# Patient Record
Sex: MALE | Race: WHITE | Employment: OTHER | RURAL
[De-identification: names, ages, dates, MRNs, and addresses within clinical notes are randomized per-mention and may not be internally consistent; named-entity substitution may affect disease eponyms.]

---

## 2017-02-03 ENCOUNTER — HOSPITAL ENCOUNTER (OUTPATIENT)
Dept: OTHER | Age: 77
Discharge: HOME | End: 2017-02-03
Attending: NURSE PRACTITIONER | Admitting: NURSE PRACTITIONER

## 2017-09-08 ENCOUNTER — HOSPITAL ENCOUNTER (OUTPATIENT)
Dept: OTHER | Age: 77
Discharge: HOME | End: 2017-09-08
Attending: NURSE PRACTITIONER | Admitting: NURSE PRACTITIONER

## 2018-06-28 ENCOUNTER — HOSPITAL ENCOUNTER (OUTPATIENT)
Dept: OTHER | Age: 78
Discharge: HOME | End: 2018-06-28
Attending: NURSE PRACTITIONER | Admitting: NURSE PRACTITIONER

## 2018-10-24 ENCOUNTER — HOSPITAL ENCOUNTER (OUTPATIENT)
Dept: GENERAL RADIOLOGY | Facility: HOSPITAL | Age: 78
Discharge: HOME | End: 2018-10-24
Payer: MEDICARE

## 2018-10-24 ENCOUNTER — HOSPITAL ENCOUNTER (OUTPATIENT)
Facility: HOSPITAL | Age: 78
Discharge: HOME | End: 2018-10-24
Payer: MEDICARE

## 2018-10-24 DIAGNOSIS — M25.552 BILATERAL HIP PAIN: ICD-10-CM

## 2018-10-24 DIAGNOSIS — M54.40 LOW BACK PAIN WITH SCIATICA, SCIATICA LATERALITY UNSPECIFIED, UNSPECIFIED BACK PAIN LATERALITY, UNSPECIFIED CHRONICITY: ICD-10-CM

## 2018-10-24 DIAGNOSIS — M25.551 BILATERAL HIP PAIN: ICD-10-CM

## 2018-10-24 PROCEDURE — 73521 X-RAY EXAM HIPS BI 2 VIEWS: CPT

## 2018-10-24 PROCEDURE — 72100 X-RAY EXAM L-S SPINE 2/3 VWS: CPT

## 2018-11-20 ENCOUNTER — HOSPITAL ENCOUNTER (OUTPATIENT)
Facility: HOSPITAL | Age: 78
Discharge: HOME | End: 2018-11-20
Payer: MEDICARE

## 2018-11-20 PROCEDURE — 36415 COLL VENOUS BLD VENIPUNCTURE: CPT

## 2019-05-22 ENCOUNTER — HOSPITAL ENCOUNTER (OUTPATIENT)
Facility: HOSPITAL | Age: 79
Discharge: HOME | End: 2019-05-22
Payer: MEDICARE

## 2019-05-22 PROCEDURE — 36415 COLL VENOUS BLD VENIPUNCTURE: CPT

## 2020-03-12 ENCOUNTER — HOSPITAL ENCOUNTER (OUTPATIENT)
Facility: HOSPITAL | Age: 80
Discharge: HOME | End: 2020-03-12
Payer: MEDICARE

## 2020-03-12 ENCOUNTER — HOSPITAL ENCOUNTER (OUTPATIENT)
Dept: GENERAL RADIOLOGY | Facility: HOSPITAL | Age: 80
Discharge: HOME | End: 2020-03-12
Payer: MEDICARE

## 2020-03-12 PROCEDURE — 72100 X-RAY EXAM L-S SPINE 2/3 VWS: CPT

## 2020-03-12 PROCEDURE — 73502 X-RAY EXAM HIP UNI 2-3 VIEWS: CPT

## 2020-10-26 ENCOUNTER — HOSPITAL ENCOUNTER (OUTPATIENT)
Facility: HOSPITAL | Age: 80
Discharge: HOME | End: 2020-10-26
Payer: MEDICARE

## 2020-10-26 PROCEDURE — 36415 COLL VENOUS BLD VENIPUNCTURE: CPT

## 2021-04-16 ENCOUNTER — HOSPITAL ENCOUNTER (OUTPATIENT)
Facility: HOSPITAL | Age: 81
Discharge: HOME | End: 2021-04-16
Payer: MEDICARE

## 2021-04-16 PROCEDURE — 36415 COLL VENOUS BLD VENIPUNCTURE: CPT

## 2021-06-15 ENCOUNTER — HOSPITAL ENCOUNTER (OUTPATIENT)
Facility: HOSPITAL | Age: 81
Discharge: HOME | End: 2021-06-15
Payer: MEDICARE

## 2021-06-15 PROCEDURE — 36415 COLL VENOUS BLD VENIPUNCTURE: CPT

## 2021-09-07 ENCOUNTER — HOSPITAL ENCOUNTER (OUTPATIENT)
Facility: HOSPITAL | Age: 81
Discharge: HOME | End: 2021-09-07
Payer: MEDICARE

## 2021-09-07 PROCEDURE — 36415 COLL VENOUS BLD VENIPUNCTURE: CPT

## 2022-01-31 ENCOUNTER — HOSPITAL ENCOUNTER (INPATIENT)
Facility: HOSPITAL | Age: 82
LOS: 3 days | Discharge: HOME | DRG: 177 | End: 2022-02-03
Attending: EMERGENCY MEDICINE | Admitting: INTERNAL MEDICINE
Payer: MEDICARE

## 2022-01-31 ENCOUNTER — APPOINTMENT (OUTPATIENT)
Dept: CT IMAGING | Facility: HOSPITAL | Age: 82
DRG: 177 | End: 2022-01-31
Payer: MEDICARE

## 2022-01-31 ENCOUNTER — APPOINTMENT (OUTPATIENT)
Dept: GENERAL RADIOLOGY | Facility: HOSPITAL | Age: 82
DRG: 177 | End: 2022-01-31
Payer: MEDICARE

## 2022-01-31 DIAGNOSIS — U07.1 PNEUMONIA DUE TO COVID-19 VIRUS: Primary | ICD-10-CM

## 2022-01-31 DIAGNOSIS — J12.82 PNEUMONIA DUE TO COVID-19 VIRUS: Primary | ICD-10-CM

## 2022-01-31 PROBLEM — J96.01 ACUTE HYPOXEMIC RESPIRATORY FAILURE DUE TO COVID-19 (HCC): Status: ACTIVE | Noted: 2022-01-31

## 2022-01-31 LAB
A/G RATIO: 1.4 (ref 0.8–2)
ACETAMINOPHEN LEVEL: <15 UG/ML (ref 10–30)
ALBUMIN SERPL-MCNC: 3.8 G/DL (ref 3.4–4.8)
ALP BLD-CCNC: 58 U/L (ref 25–100)
ALT SERPL-CCNC: 14 U/L (ref 4–36)
AMMONIA: 37 MCG/DL (ref 27–102)
AMORPHOUS: ABNORMAL /HPF
AMPHETAMINE SCREEN, URINE: NORMAL
ANION GAP SERPL CALCULATED.3IONS-SCNC: 16 MMOL/L (ref 3–16)
AST SERPL-CCNC: 22 U/L (ref 8–33)
BACTERIA: ABNORMAL /HPF
BARBITURATE SCREEN URINE: NORMAL
BASE EXCESS ARTERIAL: -0.7 MMOL/L (ref -3–3)
BASE EXCESS ARTERIAL: -2 MMOL/L (ref -3–3)
BASOPHILS ABSOLUTE: 0 K/UL (ref 0–0.1)
BASOPHILS RELATIVE PERCENT: 0.1 %
BENZODIAZEPINE SCREEN, URINE: NORMAL
BILIRUB SERPL-MCNC: 0.5 MG/DL (ref 0.3–1.2)
BILIRUBIN URINE: NEGATIVE
BLOOD, URINE: ABNORMAL
BUN BLDV-MCNC: 29 MG/DL (ref 6–20)
BUPRENORPHINE QUAL, URINE: NORMAL
C-REACTIVE PROTEIN: 44.6 MG/L (ref 0–5.1)
CALCIUM SERPL-MCNC: 9.1 MG/DL (ref 8.5–10.5)
CANNABINOID SCREEN URINE: NORMAL
CHLORIDE BLD-SCNC: 98 MMOL/L (ref 98–107)
CLARITY: CLEAR
CO2: 22 MMOL/L (ref 20–30)
COCAINE METABOLITE SCREEN URINE: NORMAL
COLOR: YELLOW
CREAT SERPL-MCNC: 2.2 MG/DL (ref 0.4–1.2)
D DIMER: >4 UG/ML FEU (ref 0–0.6)
EOSINOPHILS ABSOLUTE: 0 K/UL (ref 0–0.4)
EOSINOPHILS RELATIVE PERCENT: 0 %
ETHANOL: NORMAL MG/DL (ref 0–0.08)
FERRITIN: 672.1 NG/ML (ref 22–322)
FINE CASTS, UA: ABNORMAL /LPF (ref 0–2)
FIO2: 0.21 %
FIO2: 0.32 %
GFR AFRICAN AMERICAN: 35
GFR NON-AFRICAN AMERICAN: 29
GLOBULIN: 2.7 G/DL
GLUCOSE BLD-MCNC: 216 MG/DL (ref 74–106)
GLUCOSE BLD-MCNC: 228 MG/DL (ref 74–106)
GLUCOSE BLD-MCNC: 250 MG/DL (ref 74–106)
GLUCOSE BLD-MCNC: 257 MG/DL (ref 74–106)
GLUCOSE URINE: 250 MG/DL
HCO3 ARTERIAL: 20.5 MMOL/L (ref 22–26)
HCO3 ARTERIAL: 21.8 MMOL/L (ref 22–26)
HCT VFR BLD CALC: 46.8 % (ref 40–54)
HEMOGLOBIN: 15.9 G/DL (ref 13–18)
IMMATURE GRANULOCYTES #: 0.1 K/UL
IMMATURE GRANULOCYTES %: 0.6 % (ref 0–5)
KETONES, URINE: NEGATIVE MG/DL
LACTIC ACID: 2.2 MMOL/L (ref 0.4–2)
LEUKOCYTE ESTERASE, URINE: NEGATIVE
LYMPHOCYTES ABSOLUTE: 0.5 K/UL (ref 1.5–4)
LYMPHOCYTES RELATIVE PERCENT: 6.5 %
Lab: NORMAL
MCH RBC QN AUTO: 30.3 PG (ref 27–32)
MCHC RBC AUTO-ENTMCNC: 34 G/DL (ref 31–35)
MCV RBC AUTO: 89.3 FL (ref 80–100)
METHADONE SCREEN, URINE: NORMAL
METHAMPHETAMINE, URINE: NORMAL
MICROSCOPIC EXAMINATION: YES
MONOCYTES ABSOLUTE: 0.5 K/UL (ref 0.2–0.8)
MONOCYTES RELATIVE PERCENT: 6.3 %
NEUTROPHILS ABSOLUTE: 7.2 K/UL (ref 2–7.5)
NEUTROPHILS RELATIVE PERCENT: 86.5 %
NITRITE, URINE: NEGATIVE
O2 SAT, ARTERIAL: 91.1 %
O2 SAT, ARTERIAL: 94.3 %
O2 THERAPY: ABNORMAL
O2 THERAPY: ABNORMAL
OPIATE SCREEN URINE: NORMAL
PCO2 ARTERIAL: 29.3 MMHG (ref 35–45)
PCO2 ARTERIAL: 30.3 MMHG (ref 35–45)
PDW BLD-RTO: 13.7 % (ref 11–16)
PERFORMED ON: ABNORMAL
PH ARTERIAL: 7.46 (ref 7.35–7.45)
PH ARTERIAL: 7.47 (ref 7.35–7.45)
PH UA: 5.5 (ref 5–8)
PHENCYCLIDINE SCREEN URINE: NORMAL
PLATELET # BLD: 125 K/UL (ref 150–400)
PMV BLD AUTO: 9.9 FL (ref 6–10)
PO2 ARTERIAL: 57.6 MMHG (ref 80–100)
PO2 ARTERIAL: 70.4 MMHG (ref 80–100)
POTASSIUM REFLEX MAGNESIUM: 4.1 MMOL/L (ref 3.4–5.1)
PRO-BNP: 342 PG/ML (ref 0–1800)
PROPOXYPHENE SCREEN, URINE: NORMAL
PROTEIN UA: 100 MG/DL
RBC # BLD: 5.24 M/UL (ref 4.5–6)
RBC UA: ABNORMAL /HPF (ref 0–4)
SALICYLATE, SERUM: 0.4 MG/DL (ref 15–30)
SARS-COV-2, NAAT: DETECTED
SODIUM BLD-SCNC: 136 MMOL/L (ref 136–145)
SPECIFIC GRAVITY UA: 1.02 (ref 1–1.03)
TCO2 ARTERIAL: 21.4 MMOL/L (ref 24–30)
TCO2 ARTERIAL: 22.7 MMOL/L (ref 24–30)
TOTAL CK: 128 U/L (ref 26–174)
TOTAL PROTEIN: 6.5 G/DL (ref 6.4–8.3)
TRICYCLIC, URINE: NORMAL
TSH SERPL DL<=0.05 MIU/L-ACNC: 0.41 UIU/ML (ref 0.27–4.2)
UR OXYCODONE RAPID SCREEN: NORMAL
URINE REFLEX TO CULTURE: ABNORMAL
URINE TYPE: ABNORMAL
UROBILINOGEN, URINE: 0.2 E.U./DL
WBC # BLD: 8.3 K/UL (ref 4–11)
WBC UA: ABNORMAL /HPF (ref 0–5)

## 2022-01-31 PROCEDURE — 6360000002 HC RX W HCPCS: Performed by: INTERNAL MEDICINE

## 2022-01-31 PROCEDURE — 2580000003 HC RX 258: Performed by: PHYSICIAN ASSISTANT

## 2022-01-31 PROCEDURE — 6360000002 HC RX W HCPCS: Performed by: PHYSICIAN ASSISTANT

## 2022-01-31 PROCEDURE — 81001 URINALYSIS AUTO W/SCOPE: CPT

## 2022-01-31 PROCEDURE — 86140 C-REACTIVE PROTEIN: CPT

## 2022-01-31 PROCEDURE — 82077 ASSAY SPEC XCP UR&BREATH IA: CPT

## 2022-01-31 PROCEDURE — 71250 CT THORAX DX C-: CPT

## 2022-01-31 PROCEDURE — 85025 COMPLETE CBC W/AUTO DIFF WBC: CPT

## 2022-01-31 PROCEDURE — 80179 DRUG ASSAY SALICYLATE: CPT

## 2022-01-31 PROCEDURE — 84443 ASSAY THYROID STIM HORMONE: CPT

## 2022-01-31 PROCEDURE — 6370000000 HC RX 637 (ALT 250 FOR IP): Performed by: PHYSICIAN ASSISTANT

## 2022-01-31 PROCEDURE — 36600 WITHDRAWAL OF ARTERIAL BLOOD: CPT

## 2022-01-31 PROCEDURE — 80307 DRUG TEST PRSMV CHEM ANLYZR: CPT

## 2022-01-31 PROCEDURE — 82803 BLOOD GASES ANY COMBINATION: CPT

## 2022-01-31 PROCEDURE — XW0DXM6 INTRODUCTION OF BARICITINIB INTO MOUTH AND PHARYNX, EXTERNAL APPROACH, NEW TECHNOLOGY GROUP 6: ICD-10-PCS | Performed by: INTERNAL MEDICINE

## 2022-01-31 PROCEDURE — 87635 SARS-COV-2 COVID-19 AMP PRB: CPT

## 2022-01-31 PROCEDURE — 99284 EMERGENCY DEPT VISIT MOD MDM: CPT

## 2022-01-31 PROCEDURE — 85379 FIBRIN DEGRADATION QUANT: CPT

## 2022-01-31 PROCEDURE — 82550 ASSAY OF CK (CPK): CPT

## 2022-01-31 PROCEDURE — 36415 COLL VENOUS BLD VENIPUNCTURE: CPT

## 2022-01-31 PROCEDURE — 83605 ASSAY OF LACTIC ACID: CPT

## 2022-01-31 PROCEDURE — 6370000000 HC RX 637 (ALT 250 FOR IP): Performed by: INTERNAL MEDICINE

## 2022-01-31 PROCEDURE — 80143 DRUG ASSAY ACETAMINOPHEN: CPT

## 2022-01-31 PROCEDURE — 1200000000 HC SEMI PRIVATE

## 2022-01-31 PROCEDURE — 70450 CT HEAD/BRAIN W/O DYE: CPT

## 2022-01-31 PROCEDURE — 71045 X-RAY EXAM CHEST 1 VIEW: CPT

## 2022-01-31 PROCEDURE — 80053 COMPREHEN METABOLIC PANEL: CPT

## 2022-01-31 PROCEDURE — 82728 ASSAY OF FERRITIN: CPT

## 2022-01-31 PROCEDURE — 84145 PROCALCITONIN (PCT): CPT

## 2022-01-31 PROCEDURE — 2500000003 HC RX 250 WO HCPCS: Performed by: PHYSICIAN ASSISTANT

## 2022-01-31 PROCEDURE — 83880 ASSAY OF NATRIURETIC PEPTIDE: CPT

## 2022-01-31 PROCEDURE — 82140 ASSAY OF AMMONIA: CPT

## 2022-01-31 PROCEDURE — XW033E5 INTRODUCTION OF REMDESIVIR ANTI-INFECTIVE INTO PERIPHERAL VEIN, PERCUTANEOUS APPROACH, NEW TECHNOLOGY GROUP 5: ICD-10-PCS | Performed by: INTERNAL MEDICINE

## 2022-01-31 PROCEDURE — 93005 ELECTROCARDIOGRAM TRACING: CPT

## 2022-01-31 RX ORDER — ACETAMINOPHEN 325 MG/1
650 TABLET ORAL EVERY 6 HOURS PRN
Status: DISCONTINUED | OUTPATIENT
Start: 2022-01-31 | End: 2022-02-03 | Stop reason: HOSPADM

## 2022-01-31 RX ORDER — ZINC SULFATE 50(220)MG
50 CAPSULE ORAL DAILY
Status: DISCONTINUED | OUTPATIENT
Start: 2022-01-31 | End: 2022-02-03 | Stop reason: HOSPADM

## 2022-01-31 RX ORDER — AZITHROMYCIN 250 MG/1
500 TABLET, FILM COATED ORAL DAILY
Status: COMPLETED | OUTPATIENT
Start: 2022-01-31 | End: 2022-01-31

## 2022-01-31 RX ORDER — PANTOPRAZOLE SODIUM 40 MG/1
40 TABLET, DELAYED RELEASE ORAL DAILY
Status: DISCONTINUED | OUTPATIENT
Start: 2022-01-31 | End: 2022-02-03 | Stop reason: HOSPADM

## 2022-01-31 RX ORDER — ONDANSETRON 2 MG/ML
4 INJECTION INTRAMUSCULAR; INTRAVENOUS EVERY 6 HOURS PRN
Status: DISCONTINUED | OUTPATIENT
Start: 2022-01-31 | End: 2022-02-03 | Stop reason: HOSPADM

## 2022-01-31 RX ORDER — ALBUTEROL SULFATE 90 UG/1
2 AEROSOL, METERED RESPIRATORY (INHALATION) EVERY 6 HOURS PRN
Status: DISCONTINUED | OUTPATIENT
Start: 2022-01-31 | End: 2022-02-03 | Stop reason: HOSPADM

## 2022-01-31 RX ORDER — TAMSULOSIN HYDROCHLORIDE 0.4 MG/1
0.4 CAPSULE ORAL DAILY
Status: DISCONTINUED | OUTPATIENT
Start: 2022-01-31 | End: 2022-02-03 | Stop reason: HOSPADM

## 2022-01-31 RX ORDER — ASCORBIC ACID 500 MG
500 TABLET ORAL DAILY
Status: DISCONTINUED | OUTPATIENT
Start: 2022-01-31 | End: 2022-02-03 | Stop reason: HOSPADM

## 2022-01-31 RX ORDER — ACETAMINOPHEN 650 MG/1
650 SUPPOSITORY RECTAL EVERY 6 HOURS PRN
Status: DISCONTINUED | OUTPATIENT
Start: 2022-01-31 | End: 2022-02-03 | Stop reason: HOSPADM

## 2022-01-31 RX ORDER — SODIUM CHLORIDE 9 MG/ML
INJECTION, SOLUTION INTRAVENOUS CONTINUOUS
Status: ACTIVE | OUTPATIENT
Start: 2022-01-31 | End: 2022-02-01

## 2022-01-31 RX ORDER — NICOTINE 21 MG/24HR
1 PATCH, TRANSDERMAL 24 HOURS TRANSDERMAL DAILY
Status: DISCONTINUED | OUTPATIENT
Start: 2022-01-31 | End: 2022-02-03 | Stop reason: HOSPADM

## 2022-01-31 RX ORDER — NICOTINE POLACRILEX 4 MG
15 LOZENGE BUCCAL PRN
Status: DISCONTINUED | OUTPATIENT
Start: 2022-01-31 | End: 2022-02-03 | Stop reason: HOSPADM

## 2022-01-31 RX ORDER — ALOGLIPTIN 12.5 MG/1
6.25 TABLET, FILM COATED ORAL DAILY
Status: DISCONTINUED | OUTPATIENT
Start: 2022-01-31 | End: 2022-02-03 | Stop reason: HOSPADM

## 2022-01-31 RX ORDER — LEVOTHYROXINE SODIUM 0.05 MG/1
50 TABLET ORAL DAILY
Status: DISCONTINUED | OUTPATIENT
Start: 2022-01-31 | End: 2022-02-03 | Stop reason: HOSPADM

## 2022-01-31 RX ORDER — DEXAMETHASONE 1 MG
1 TABLET ORAL DAILY
Status: DISCONTINUED | OUTPATIENT
Start: 2022-01-31 | End: 2022-01-31 | Stop reason: ALTCHOICE

## 2022-01-31 RX ORDER — LANOLIN ALCOHOL/MO/W.PET/CERES
400 CREAM (GRAM) TOPICAL DAILY
Status: DISCONTINUED | OUTPATIENT
Start: 2022-01-31 | End: 2022-02-03 | Stop reason: HOSPADM

## 2022-01-31 RX ORDER — ONDANSETRON 4 MG/1
4 TABLET, ORALLY DISINTEGRATING ORAL EVERY 8 HOURS PRN
Status: DISCONTINUED | OUTPATIENT
Start: 2022-01-31 | End: 2022-02-03 | Stop reason: HOSPADM

## 2022-01-31 RX ORDER — ONDANSETRON 2 MG/ML
4 INJECTION INTRAMUSCULAR; INTRAVENOUS ONCE
Status: DISCONTINUED | OUTPATIENT
Start: 2022-01-31 | End: 2022-01-31

## 2022-01-31 RX ORDER — GUAIFENESIN 600 MG/1
600 TABLET, EXTENDED RELEASE ORAL 2 TIMES DAILY
Status: DISCONTINUED | OUTPATIENT
Start: 2022-01-31 | End: 2022-02-03 | Stop reason: HOSPADM

## 2022-01-31 RX ORDER — AZITHROMYCIN 250 MG/1
250 TABLET, FILM COATED ORAL DAILY
Status: DISCONTINUED | OUTPATIENT
Start: 2022-02-01 | End: 2022-02-03 | Stop reason: HOSPADM

## 2022-01-31 RX ORDER — ROSUVASTATIN CALCIUM 20 MG/1
20 TABLET, COATED ORAL DAILY
COMMUNITY

## 2022-01-31 RX ORDER — CILOSTAZOL 100 MG/1
100 TABLET ORAL 2 TIMES DAILY
Status: DISCONTINUED | OUTPATIENT
Start: 2022-01-31 | End: 2022-02-03 | Stop reason: HOSPADM

## 2022-01-31 RX ORDER — GUAIFENESIN/DEXTROMETHORPHAN 100-10MG/5
5 SYRUP ORAL EVERY 4 HOURS PRN
Status: DISCONTINUED | OUTPATIENT
Start: 2022-01-31 | End: 2022-02-03 | Stop reason: HOSPADM

## 2022-01-31 RX ORDER — DEXTROSE MONOHYDRATE 25 G/50ML
12.5 INJECTION, SOLUTION INTRAVENOUS PRN
Status: DISCONTINUED | OUTPATIENT
Start: 2022-01-31 | End: 2022-02-03 | Stop reason: HOSPADM

## 2022-01-31 RX ORDER — POLYETHYLENE GLYCOL 3350 17 G/17G
17 POWDER, FOR SOLUTION ORAL DAILY PRN
Status: DISCONTINUED | OUTPATIENT
Start: 2022-01-31 | End: 2022-02-03 | Stop reason: HOSPADM

## 2022-01-31 RX ORDER — DEXTROSE MONOHYDRATE 50 MG/ML
100 INJECTION, SOLUTION INTRAVENOUS PRN
Status: DISCONTINUED | OUTPATIENT
Start: 2022-01-31 | End: 2022-02-03 | Stop reason: HOSPADM

## 2022-01-31 RX ORDER — OMEPRAZOLE 20 MG/1
20 CAPSULE, DELAYED RELEASE ORAL DAILY
COMMUNITY

## 2022-01-31 RX ADMIN — CILOSTAZOL 100 MG: 100 TABLET ORAL at 20:34

## 2022-01-31 RX ADMIN — CEFTRIAXONE 1000 MG: 1 INJECTION, POWDER, FOR SOLUTION INTRAMUSCULAR; INTRAVENOUS at 15:53

## 2022-01-31 RX ADMIN — ALOGLIPTIN 6.25 MG: 12.5 TABLET, FILM COATED ORAL at 15:33

## 2022-01-31 RX ADMIN — ZINC SULFATE 220 MG (50 MG) CAPSULE 50 MG: CAPSULE at 15:48

## 2022-01-31 RX ADMIN — GUAIFENESIN AND DEXTROMETHORPHAN 5 ML: 100; 10 SYRUP ORAL at 15:48

## 2022-01-31 RX ADMIN — Medication 2 UNITS: at 16:57

## 2022-01-31 RX ADMIN — GUAIFENESIN 600 MG: 600 TABLET, EXTENDED RELEASE ORAL at 20:34

## 2022-01-31 RX ADMIN — REMDESIVIR 200 MG: 100 INJECTION, POWDER, LYOPHILIZED, FOR SOLUTION INTRAVENOUS at 16:49

## 2022-01-31 RX ADMIN — LEVOTHYROXINE SODIUM 50 MCG: 0.05 TABLET ORAL at 15:33

## 2022-01-31 RX ADMIN — TAMSULOSIN HYDROCHLORIDE 0.4 MG: 0.4 CAPSULE ORAL at 20:40

## 2022-01-31 RX ADMIN — Medication 1 UNITS: at 20:56

## 2022-01-31 RX ADMIN — Medication 400 MG: at 15:48

## 2022-01-31 RX ADMIN — BARICITINIB 2 MG: 2 TABLET, FILM COATED ORAL at 20:34

## 2022-01-31 RX ADMIN — AZITHROMYCIN MONOHYDRATE 500 MG: 250 TABLET ORAL at 15:48

## 2022-01-31 RX ADMIN — PANTOPRAZOLE SODIUM 40 MG: 40 TABLET, DELAYED RELEASE ORAL at 15:33

## 2022-01-31 RX ADMIN — OXYCODONE HYDROCHLORIDE AND ACETAMINOPHEN 500 MG: 500 TABLET ORAL at 15:48

## 2022-01-31 RX ADMIN — ENOXAPARIN SODIUM 30 MG: 100 INJECTION SUBCUTANEOUS at 20:42

## 2022-01-31 RX ADMIN — DEXAMETHASONE 6 MG: 4 TABLET ORAL at 15:48

## 2022-01-31 RX ADMIN — SODIUM CHLORIDE: 9 INJECTION, SOLUTION INTRAVENOUS at 15:51

## 2022-01-31 ASSESSMENT — PAIN SCALES - GENERAL
PAINLEVEL_OUTOF10: 0

## 2022-01-31 NOTE — ED NOTES
Patient instructed that a urine sample is needed, understanding verbalized. Patient given urinal at this time.      Aashish Black RN  01/31/22 9989

## 2022-01-31 NOTE — ED PROVIDER NOTES
[address] [facility]      Pt Name: Domo Somers  MRN: [id number]  YOB: 1940  Date of evaluation: 1/31/2022  Provider: Catrina Encarnacion DO    CHIEF COMPLAINT       Chief Complaint   Patient presents with    Altered Mental Status     patient is A/O, answers questions appropriately however he is slow at times to answer. EMS states they know him well and he is A/O but \"somewhat off\". , 98.9 temp, , 94% / 3L, 125/70    Other     patient was found laying in floor by a family member, unknown amount of time he was in the floor    Diarrhea     one episode last night         HISTORY OF PRESENT ILLNESS  (Location/Symptom, Timing/Onset, Context/Setting, Quality, Duration, Modifying Factors, Severity.)   Domo Somers is a [de-identified] y.o. male who presents to the emergency department with a chief complaint of altered mental status. EMS states that they have known patient for years and he is not acting himself. States that he knows who he is and where he is. Patient was found by me to be able to answer questions appropriately. He states he has been coughing and has had terrible diarrhea. She just feels generally weak all over. He is not sure how long he has been sick says he has felt bad for couple days. States he fell on the floor and could not get up. States he was in the floor for a few hours. He denies any pain. States he does not think that he lost consciousness. Nursing notes were reviewed.     REVIEW OFSYSTEMS    (2-9 systems for level 4, 10 or more for level 5)   ROS:  General:  No fevers, no chills, no weakness  Cardiovascular:  No chest pain, no palpitations  Respiratory:  No shortness of breath, +cough, no wheezing  Gastrointestinal:  No pain, +nausea, no vomiting, +diarrhea  Musculoskeletal:  No muscle pain, no joint pain  Skin:  No rash, no easy bruising  Neurologic:  No speech problems, no headache, no extremity weakness  Psychiatric:  No anxiety  Genitourinary:  No dysuria, no hematuria    Except as noted above the remainder of the review of systems was reviewed and negative. PAST MEDICAL HISTORY     Past Medical History:   Diagnosis Date    Cataract     DM [facility]di[facility] mellitus) [facility] [address]     Erectile dysfunction     High cholesterol     Hypertension     Hypothyroidism     Peripheral vascular disease ([facility] [address]     Pneumonia          SURGICAL HISTORY       Past Surgical History:   Procedure Laterality Date    CYST REMOVAL      under right arm     EYE SURGERY  01/2012    cataract    FOOT SURGERY      growth removed; left         CURRENT MEDICATIONS       Previous Medications    AMLODIPINE (NORVASC) 5 MG TABLET    Take 1 tablet by mouth daily. CILOSTAZOL (PLETAL) 100 MG TABLET    Take 1 tablet by mouth 2 times daily. FUROSEMIDE (LASIX) 20 MG TABLET    take 1 tablet by mouth every other day    LEVOTHYROXINE (SYNTHROID) 50 MCG TABLET    take 1 tablet by mouth once daily    LOSARTAN-HYDROCHLOROTHIAZIDE (HYZAAR) 100-25 MG PER TABLET    take 1 tablet by mouth once daily    OMEPRAZOLE (PRILOSEC) 20 MG DELAYED RELEASE CAPSULE    Take 20 mg by mouth daily    ROSUVASTATIN (CRESTOR) 20 MG TABLET    Take 20 mg by mouth daily    SITAGLIPTIN (JANUVIA) 50 MG TABLET    Take 50 mg by mouth daily       ALLERGIES     Patient has no known allergies.     FAMILY HISTORY       Family History   Problem Relation Age of Onset    Stroke Mother     Heart Disease Daughter         MI    Stroke Daughter           SOCIAL HISTORY       Social History     Socioeconomic History    Marital status:      Spouse name: None    Number of children: None    Years of education: None    Highest education level: None   Occupational History    None   Tobacco Use    Smoking status: Current Every Day Smoker     Packs/day: 0.50     Years: 53.00     Pack years: 26.50     Types: Cigarettes    Smokeless tobacco: Never Used   Substance and Sexual Activity    Alcohol use: No    Drug use: No    Sexual activity: None   Other Topics Concern    None   Social History Narrative    None     Social Determinants of Health     Financial Resource Strain:     Difficulty of Paying Living Expenses: Not on file   Food Insecurity:     Worried About Running Out of Food in the Last Year: Not on file    Niraj of Food in the Last Year: Not on file   Transportation Needs:     Lack of Transportation (Medical): Not on file    Lack of Transportation (Non-Medical): Not on file   Physical Activity:     Days of Exercise per Week: Not on file    Minutes of Exercise per Session: Not on file   Stress:     Feeling of Stress : Not on file   Social Connections:     Frequency of Communication with Friends and Family: Not on file    Frequency of Social Gatherings with Friends and Family: Not on file    Attends Judaism Services: Not on file    Active Member of [address] [place] [address] [employer] or Organizations: Not on file    Attends Club or Organization Meetings: Not on file    Marital Status: Not on file   Intimate Partner Violence:     Fear of Current or Ex-Partner: Not on file    Emotionally Abused: Not on file    Physically Abused: Not on file    Sexually Abused: Not on file   Housing Stability:     Unable to Pay for Housing in the Last Year: Not on file    Number of [place] in the Last Year: Not on file    Unstable Housing in the Last Year: Not on file         PHYSICAL EXAM    (up to 7 for level 4, 8 or more for level 5)     ED Triage Vitals   BP Temp Temp src Pulse Resp SpO2 Height Weight   -- -- -- -- -- -- -- --       Physical Exam  General :Patient is awake, alert, oriented, in no acute distress, nontoxic appearing  HEENT: Pupils are equally round and reactive to light, EOMI, conjunctivae clear. Oral mucosa is moist, no exudate.  Uvula is midline  Neck: Neck is supple, full range of motion, trachea midline  Cardiac: Heart regular rate, rhythm, no murmurs, rubs, or gallops  Lungs: Lungs are clear to auscultation, there is no wheezing, rhonchi, or rales. There is no use of accessory muscles. Abdomen: Abdomen is soft, nontender, nondistended. There is no firm or pulsatile masses, no rebound rigidity or guarding. Musculoskeletal: 5 out of 5 strength in all 4 extremities. No focal muscle deficits are appreciated. Entire spine was palpated no tenderness. All extremities were palpated no tenderness. Full range cervical spine including flexion-extension without pain. Neuro: Motor intact, sensory intact, level of consciousness is normal.  Dermatology: Skin is warm and dry  Psych: Mentation is grossly normal, cognition is grossly normal. Affect is appropriate. DIAGNOSTIC RESULTS     EKG: All EKG's are interpreted by the Emergency Department Physician who either signs or Co-signs this chart in the [address] a cardiologist.    The EKG interpreted by me shows sinus tachycardia rate 112 left axis left anterior fascicular block normal ME interval normal QT interval normal ST segments. RADIOLOGY:   Non-plain film images such as CT, Ultrasound and MRI are read by the radiologist. Plain radiographic images are visualized and preliminarily interpreted by the emergency physician with the below findings:      ? Radiologist's Report Reviewed:  CT CHEST WO CONTRAST   Final Result      1. Patchy geographic areas of airspace disease in the left upper lobe and left lower lobe suspicious for multifocal left-sided pneumonia. Follow-up chest x-ray in one month recommended to document resolution. 2. Extensive coronary artery calcification. 3. Trace pericardial effusion. CT Head WO Contrast   Final Result      No acute intracranial hemorrhage or mass effect. Moderate multifocal white matter disease, most likely reflecting chronic small vessel ischemic changes. XR CHEST PORTABLE   Final Result      Focal nodular opacity is present in the left midlung zone.  This is indeterminate. Recommend chest CT with intravenous contrast for further assessment.             ED BEDSIDE ULTRASOUND:   Performed by ED Physician - none    LABS:    I have reviewed and interpreted all of the currently available lab results from this visit (ifapplicable):  Results for orders placed or performed during the hospital encounter of 01/31/22   COVID-19, Rapid    Specimen: Nasopharyngeal Swab   Result Value Ref Range    SARS-CoV-2, NAAT DETECTED (AA) Not Detected   CBC Auto Differential   Result Value Ref Range    WBC 8.3 4.0 - 11.0 K/uL    RBC 5.24 4.50 - 6.00 M/uL    Hemoglobin 15.9 13.0 - 18.0 g/dL    Hematocrit 46.8 40.0 - 54.0 %    MCV 89.3 80.0 - 100.0 fL    MCH 30.3 27.0 - 32.0 pg    MCHC 34.0 31.0 - 35.0 g/dL    RDW 13.7 11.0 - 16.0 %    Platelets 846 (L) 752 - 400 K/uL    MPV 9.9 6.0 - 10.0 fL    Neutrophils % 86.5 %    Immature Granulocytes % 0.6 0.0 - 5.0 %    Lymphocytes % 6.5 %    Monocytes % 6.3 %    Eosinophils % 0.0 %    Basophils % 0.1 %    Neutrophils Absolute 7.2 2.0 - 7.5 K/uL    Immature Granulocytes # 0.1 K/uL    Lymphocytes Absolute 0.5 (L) 1.5 - 4.0 K/uL    Monocytes Absolute 0.5 0.2 - 0.8 K/uL    Eosinophils Absolute 0.0 0.0 - 0.4 K/uL    Basophils Absolute 0.0 0.0 - 0.1 K/uL   Comprehensive Metabolic Panel w/ Reflex to MG   Result Value Ref Range    Sodium 136 136 - 145 mmol/L    Potassium reflex Magnesium 4.1 3.4 - 5.1 mmol/L    Chloride 98 98 - 107 mmol/L    CO2 22 20 - 30 mmol/L    Anion Gap 16 3 - 16    Glucose 257 (H) 74 - 106 mg/dL    BUN 29 (H) 6 - 20 mg/dL    CREATININE 2.2 (H) 0.4 - 1.2 mg/dL    GFR Non-African American 29 (L) >59    GFR  35 (L) >59    Calcium 9.1 8.5 - 10.5 mg/dL    Total Protein 6.5 6.4 - 8.3 g/dL    Albumin 3.8 3.4 - 4.8 g/dL    Albumin/Globulin Ratio 1.4 0.8 - 2.0    Total Bilirubin 0.5 0.3 - 1.2 mg/dL    Alkaline Phosphatase 58 25 - 100 U/L    ALT 14 4 - 36 U/L    AST 22 8 - 33 U/L    Globulin 2.7 Not Established g/dL   Blood Gas, Arterial   Result Value Ref Range    pH, Arterial 7.463 (H) 7.350 - 7.450    pCO2, Arterial 29.3 (L) 35.0 - 45.0 mmHg    pO2, Arterial 70.4 (L) 80.0 - 100.0 mmHg    HCO3, Arterial 20.5 (L) 22.0 - 26.0 mmol/L    Base Excess, Arterial -2.0 -3.0 - 3.0 mmol/L    O2 Sat, Arterial 94.3 >92 %    TCO2, Arterial 21.4 (L) 24.0 - 30.0 mmol/L    O2 Therapy Unknown     FIO2 0.32 Not Established %   Lactic Acid, Plasma   Result Value Ref Range    Lactic Acid 2.2 (H) 0.4 - 2.0 mmol/L   Urinalysis Reflex to Culture    Specimen: Urine, clean catch   Result Value Ref Range    Color, UA Yellow Straw/Yellow    Clarity, UA Clear Clear    Glucose, Ur 250 (A) Negative mg/dL    Bilirubin Urine Negative Negative    Ketones, Urine Negative Negative mg/dL    Specific Gravity, UA 1.025 1.005 - 1.030    Blood, Urine MODERATE (A) Negative    pH, UA 5.5 5.0 - 8.0    Protein,  (A) Negative mg/dL    Urobilinogen, Urine 0.2 <2.0 E.U./dL    Nitrite, Urine Negative Negative    Leukocyte Esterase, Urine Negative Negative    Microscopic Examination YES     Urine Type NotGiven     Urine Reflex to Culture Not Indicated    Ammonia   Result Value Ref Range    Ammonia 37 27 - 102 mcg/dL   Drug Screen, Multiple, Urine   Result Value Ref Range    PCP Screen, Urine Neg Negative <25 ng/mL    Benzodiazepine Screen, Urine Neg Negative <300 ng/mL    Cocaine Metabolite Screen, Urine Neg Negative <300 ng/mL    Amphetamine Screen, Urine Neg Negative <1000 ng/mL    Cannabinoid Scrn, Ur Neg Negative <50 ng/mL    Opiate Scrn, Ur Neg Negative <300 ng/mL    Barbiturate Screen, Ur Neg Negative <860 ng/mL    Tricyclic Neg Negative <104 ng/mL    Methadone Screen, Urine Neg Negative <300 ng/ml    Propoxyphene Screen, Urine Neg Negative <300 ng/mL    Methamphetamine, Urine Neg Negative <1000 ng/mL    UR Oxycodone Rapid Screen Neg Negative <100 ng/mL    Buprenorphine Qual, Urine Neg Negative <25 ng/mL    Drug Screen Comment: see below    Ethanol   Result Value Ref Range Ethanol Lvl None Detected mg/dL   Acetaminophen Level   Result Value Ref Range    Acetaminophen Level <15 10 - 30 ug/mL   Salicylate   Result Value Ref Range    Salicylate, Serum 0.4 (L) 15.0 - 30.0 mg/dL   Brain Natriuretic Peptide   Result Value Ref Range    Pro- 0 - 1,800 pg/mL   TSH without Reflex   Result Value Ref Range    TSH 0.41 0.27 - 4.20 uIU/mL   Microscopic Urinalysis   Result Value Ref Range    Fine Casts, UA 3-5 (A) 0 - 2 /LPF    WBC, UA 3-5 0 - 5 /HPF    RBC, UA 21-50 (A) 0 - 4 /HPF    Bacteria, UA 1+ (A) None Seen /HPF    Amorphous, UA 2+ /HPF   POCT Glucose   Result Value Ref Range    POC Glucose 250 (H) 74 - 106 mg/dl    Performed on ACCU-CHEK         All other labs were within normal range or not returned as of this dictation. EMERGENCY DEPARTMENT COURSE and DIFFERENTIAL DIAGNOSIS/MDM:   Vitals:    Vitals:    01/31/22 0815 01/31/22 0827 01/31/22 0830 01/31/22 0834   BP: 126/68  115/71    Pulse: 111  108 107   Resp: 23  26 21   Temp:  98.3 °F (36.8 °C)     TempSrc:  Oral     SpO2: 95%  95% 96%   Weight:    210 lb (95.3 kg)   Height:    5' 10\" (1.778 m)       MEDICATIONS ADMINISTERED IN ED:  Medications   ondansetron (ZOFRAN) injection 4 mg (has no administration in time range)       Spoke with Hans cohenit. Patient is elderly and was unable to get out of floor at home. Patient severe diarrhea and soa. He is unable to perform ADLs at home. The patient will follow-up with their PCP in 1-2 days for reevaluation. If the patient or family members have anyfurther concerns or any worsening symptoms they will return to the ED for reevaluation. CONSULTS:  None    PROCEDURES:  Procedures    CRITICAL CARE TIME    Total Critical Care time was 0 minutes, excluding separately reportable procedures. There was a high probability of clinically significant/life threatening deterioration in the patient's condition which required my urgent intervention.       FINAL IMPRESSION      1. Pneumonia due to COVID-19 virus          DISPOSITION/PLAN   DISPOSITION        PATIENT REFERRED TO:  No follow-up provider specified. DISCHARGE MEDICATIONS:  New Prescriptions    No medications on file       Comment: Please note this report has been produced using speech recognition software and may contain errorsrelated to that system including errors in grammar, punctuation, and spelling, as well as words and phrases that may be inappropriate. If there are any questions or concerns please feel free to contact the dictating providerfor clarification.     Marcia Rodriguez DO  Attending Emergency Physician              Marcia Rodriguez DO  01/31/22 1148

## 2022-01-31 NOTE — ED NOTES
Patient report to Ba Kuhn RN. Patient to go to medical unit when bed / room is cleaned. [facility] will call ED when room is ready for patient.      Johnie Gilliam RN  01/31/22 Aj Heredia 2019, RN  01/31/22 3320

## 2022-01-31 NOTE — ED NOTES
Patient's daughter Aisha Linn called for update. Her and his spouse is updated on admission to medical unit.      Phyllis Grullon RN  01/31/22 8602

## 2022-01-31 NOTE — PLAN OF CARE
Problem: Diarrhea:  Goal: Bowel elimination is within specified parameters  Description: Bowel elimination is within specified parameters  Outcome: Ongoing

## 2022-01-31 NOTE — ED NOTES
PT still not provided urine sample. No urinal to be found. PT given new urinal and instructed to provide urine sample.       Brady Passe  01/31/22 9072

## 2022-01-31 NOTE — ED NOTES
Spoke with Dr Steven Wong concerning IVF's / blood cultures, no new orders.      Natalie Romeo, RN  01/31/22 5580

## 2022-01-31 NOTE — ED NOTES
Patient's home medications reviewed with spouse. She states that she is unable to take care of him at home and is unable to help pick him up when he goes down on the floor. Patient has had one episode of diarrhea last night.      Jaguar Michaels RN  01/31/22 3653

## 2022-02-01 PROBLEM — D69.6 THROMBOCYTOPENIA (HCC): Status: ACTIVE | Noted: 2022-02-01

## 2022-02-01 PROBLEM — G93.40 ACUTE ENCEPHALOPATHY: Status: ACTIVE | Noted: 2022-02-01

## 2022-02-01 PROBLEM — N17.9 ACUTE KIDNEY INJURY SUPERIMPOSED ON CKD (HCC): Status: ACTIVE | Noted: 2022-02-01

## 2022-02-01 PROBLEM — N18.30 STAGE 3 CHRONIC KIDNEY DISEASE (HCC): Status: ACTIVE | Noted: 2022-02-01

## 2022-02-01 PROBLEM — E55.9 VITAMIN D DEFICIENCY: Status: ACTIVE | Noted: 2022-02-01

## 2022-02-01 PROBLEM — R19.7 DIARRHEA: Status: ACTIVE | Noted: 2022-02-01

## 2022-02-01 PROBLEM — N18.9 ACUTE KIDNEY INJURY SUPERIMPOSED ON CKD (HCC): Status: ACTIVE | Noted: 2022-02-01

## 2022-02-01 PROBLEM — R53.1 GENERALIZED WEAKNESS: Status: ACTIVE | Noted: 2022-02-01

## 2022-02-01 LAB
A/G RATIO: 1.5 (ref 0.8–2)
ALBUMIN SERPL-MCNC: 3.5 G/DL (ref 3.4–4.8)
ALP BLD-CCNC: 51 U/L (ref 25–100)
ALT SERPL-CCNC: 15 U/L (ref 4–36)
ANION GAP SERPL CALCULATED.3IONS-SCNC: 16 MMOL/L (ref 3–16)
AST SERPL-CCNC: 29 U/L (ref 8–33)
BILIRUB SERPL-MCNC: 0.4 MG/DL (ref 0.3–1.2)
BUN BLDV-MCNC: 30 MG/DL (ref 6–20)
C-REACTIVE PROTEIN: 128.3 MG/L (ref 0–5.1)
CALCIUM SERPL-MCNC: 9.2 MG/DL (ref 8.5–10.5)
CHLORIDE BLD-SCNC: 103 MMOL/L (ref 98–107)
CO2: 21 MMOL/L (ref 20–30)
CREAT SERPL-MCNC: 1.6 MG/DL (ref 0.4–1.2)
D DIMER: >4 UG/ML FEU (ref 0–0.6)
FERRITIN: 702.7 NG/ML (ref 22–322)
GFR AFRICAN AMERICAN: 50
GFR NON-AFRICAN AMERICAN: 42
GLOBULIN: 2.4 G/DL
GLUCOSE BLD-MCNC: 175 MG/DL (ref 74–106)
GLUCOSE BLD-MCNC: 194 MG/DL (ref 74–106)
GLUCOSE BLD-MCNC: 197 MG/DL (ref 74–106)
GLUCOSE BLD-MCNC: 243 MG/DL (ref 74–106)
GLUCOSE BLD-MCNC: 264 MG/DL (ref 74–106)
HBA1C MFR BLD: 8.8 %
HCT VFR BLD CALC: 45.4 % (ref 40–54)
HEMOGLOBIN: 15.4 G/DL (ref 13–18)
MCH RBC QN AUTO: 30.3 PG (ref 27–32)
MCHC RBC AUTO-ENTMCNC: 33.9 G/DL (ref 31–35)
MCV RBC AUTO: 89.2 FL (ref 80–100)
PDW BLD-RTO: 13.8 % (ref 11–16)
PERFORMED ON: ABNORMAL
PLATELET # BLD: 121 K/UL (ref 150–400)
PMV BLD AUTO: 10.7 FL (ref 6–10)
POTASSIUM REFLEX MAGNESIUM: 4 MMOL/L (ref 3.4–5.1)
PROCALCITONIN: 4.6 NG/ML (ref 0–0.15)
RBC # BLD: 5.09 M/UL (ref 4.5–6)
SODIUM BLD-SCNC: 140 MMOL/L (ref 136–145)
TOTAL PROTEIN: 5.9 G/DL (ref 6.4–8.3)
VITAMIN D 25-HYDROXY: 7.7 (ref 32–100)
WBC # BLD: 7.4 K/UL (ref 4–11)

## 2022-02-01 PROCEDURE — 86140 C-REACTIVE PROTEIN: CPT

## 2022-02-01 PROCEDURE — 2500000003 HC RX 250 WO HCPCS: Performed by: PHYSICIAN ASSISTANT

## 2022-02-01 PROCEDURE — 6370000000 HC RX 637 (ALT 250 FOR IP): Performed by: PHYSICIAN ASSISTANT

## 2022-02-01 PROCEDURE — 97161 PT EVAL LOW COMPLEX 20 MIN: CPT

## 2022-02-01 PROCEDURE — 36415 COLL VENOUS BLD VENIPUNCTURE: CPT

## 2022-02-01 PROCEDURE — 6370000000 HC RX 637 (ALT 250 FOR IP): Performed by: INTERNAL MEDICINE

## 2022-02-01 PROCEDURE — 2580000003 HC RX 258: Performed by: PHYSICIAN ASSISTANT

## 2022-02-01 PROCEDURE — 99223 1ST HOSP IP/OBS HIGH 75: CPT | Performed by: INTERNAL MEDICINE

## 2022-02-01 PROCEDURE — 2700000000 HC OXYGEN THERAPY PER DAY

## 2022-02-01 PROCEDURE — 82728 ASSAY OF FERRITIN: CPT

## 2022-02-01 PROCEDURE — 82306 VITAMIN D 25 HYDROXY: CPT

## 2022-02-01 PROCEDURE — 6360000002 HC RX W HCPCS: Performed by: INTERNAL MEDICINE

## 2022-02-01 PROCEDURE — 80053 COMPREHEN METABOLIC PANEL: CPT

## 2022-02-01 PROCEDURE — 83036 HEMOGLOBIN GLYCOSYLATED A1C: CPT

## 2022-02-01 PROCEDURE — 97530 THERAPEUTIC ACTIVITIES: CPT

## 2022-02-01 PROCEDURE — 85379 FIBRIN DEGRADATION QUANT: CPT

## 2022-02-01 PROCEDURE — 6360000002 HC RX W HCPCS: Performed by: PHYSICIAN ASSISTANT

## 2022-02-01 PROCEDURE — 85027 COMPLETE CBC AUTOMATED: CPT

## 2022-02-01 PROCEDURE — 1200000000 HC SEMI PRIVATE

## 2022-02-01 RX ORDER — LOSARTAN POTASSIUM 100 MG/1
100 TABLET ORAL DAILY
Status: ON HOLD | COMMUNITY
End: 2022-02-03 | Stop reason: HOSPADM

## 2022-02-01 RX ORDER — VITAMIN B COMPLEX
1000 TABLET ORAL DAILY
Status: DISCONTINUED | OUTPATIENT
Start: 2022-02-01 | End: 2022-02-03 | Stop reason: HOSPADM

## 2022-02-01 RX ORDER — HYDROCHLOROTHIAZIDE 25 MG/1
25 TABLET ORAL DAILY
Status: ON HOLD | COMMUNITY
End: 2022-02-03 | Stop reason: HOSPADM

## 2022-02-01 RX ADMIN — CEFTRIAXONE 1000 MG: 1 INJECTION, POWDER, FOR SOLUTION INTRAMUSCULAR; INTRAVENOUS at 15:21

## 2022-02-01 RX ADMIN — DEXAMETHASONE 6 MG: 4 TABLET ORAL at 08:42

## 2022-02-01 RX ADMIN — ZINC SULFATE 220 MG (50 MG) CAPSULE 50 MG: CAPSULE at 08:42

## 2022-02-01 RX ADMIN — AZITHROMYCIN MONOHYDRATE 250 MG: 250 TABLET ORAL at 08:44

## 2022-02-01 RX ADMIN — Medication 2 UNITS: at 22:01

## 2022-02-01 RX ADMIN — BARICITINIB 2 MG: 2 TABLET, FILM COATED ORAL at 20:35

## 2022-02-01 RX ADMIN — CILOSTAZOL 100 MG: 100 TABLET ORAL at 20:35

## 2022-02-01 RX ADMIN — ENOXAPARIN SODIUM 30 MG: 100 INJECTION SUBCUTANEOUS at 20:36

## 2022-02-01 RX ADMIN — OXYCODONE HYDROCHLORIDE AND ACETAMINOPHEN 500 MG: 500 TABLET ORAL at 08:44

## 2022-02-01 RX ADMIN — Medication 400 MG: at 08:44

## 2022-02-01 RX ADMIN — REMDESIVIR 100 MG: 100 INJECTION, POWDER, LYOPHILIZED, FOR SOLUTION INTRAVENOUS at 16:25

## 2022-02-01 RX ADMIN — Medication 1000 UNITS: at 11:27

## 2022-02-01 RX ADMIN — ENOXAPARIN SODIUM 30 MG: 100 INJECTION SUBCUTANEOUS at 08:43

## 2022-02-01 RX ADMIN — GUAIFENESIN 600 MG: 600 TABLET, EXTENDED RELEASE ORAL at 08:44

## 2022-02-01 RX ADMIN — LEVOTHYROXINE SODIUM 50 MCG: 0.05 TABLET ORAL at 07:14

## 2022-02-01 RX ADMIN — Medication 2 UNITS: at 16:28

## 2022-02-01 RX ADMIN — PANTOPRAZOLE SODIUM 40 MG: 40 TABLET, DELAYED RELEASE ORAL at 08:44

## 2022-02-01 RX ADMIN — CILOSTAZOL 100 MG: 100 TABLET ORAL at 08:45

## 2022-02-01 RX ADMIN — Medication 1 UNITS: at 11:29

## 2022-02-01 RX ADMIN — GUAIFENESIN 600 MG: 600 TABLET, EXTENDED RELEASE ORAL at 20:35

## 2022-02-01 RX ADMIN — ALOGLIPTIN 6.25 MG: 12.5 TABLET, FILM COATED ORAL at 08:45

## 2022-02-01 RX ADMIN — TAMSULOSIN HYDROCHLORIDE 0.4 MG: 0.4 CAPSULE ORAL at 08:44

## 2022-02-01 NOTE — ACP (ADVANCE CARE PLANNING)
Advance Care Planning     General Advance Care Planning (ACP) Conversation    Date of Conversation: 1/31/2022  Conducted with: Patient with Decision Making Capacity per nursing staff on admit    Healthcare Decision Maker:    Primary Decision Maker: Thaddeus Sachin - [phone]    Secondary Decision Maker: Chantal Strong - Pilar - [phone]  Click here to complete [address] including selection of the Healthcare Decision Maker Relationship (ie \"Primary\"). Today we documented Decision Maker(s) consistent with Legal Next of Kin hierarchy.     Content/Action Overview:  Has NO ACP documents/care preferences - information provided, considering goals and options  Reviewed DNR/DNI and patient elects Full Code (Attempt Resuscitation)    Length of Voluntary ACP Conversation in minutes:  <16 minutes (Non-Billable)    Rogerio Romberg, RN

## 2022-02-01 NOTE — FLOWSHEET NOTE
TC from [facility] in lab. Reports that pt D-Dimer remains greater than 4.  Reported to [place], [place].

## 2022-02-01 NOTE — CARE COORDINATION
01/31/22 1453   Discharge Planning   [address] Spouse/Significant Other;Family Members   Current Services Prior To Admission Durable Medical Equipment   DME [place] Medications No   DME   (per therapy recs)   Type of Home Care Services None   Patient expects to be discharged to: [facility]   Expected Discharge Date 02/04/22   Follow Up Appointment: Best Day/Time  Thursday AM     Lives with SO. Independent at baseline. Has cane.   DME per therapy recs

## 2022-02-01 NOTE — H&P
History and Physical    Patient:  Sascha Anthony    CHIEF COMPLAINT:    Found down on floor  Diarrhea  Acute encephalopathy    HISTORY OF PRESENT ILLNESS:   The patient is a [de-identified] y.o. male with PMH of diabetes mellitus type 2, hard of hearing, HLD, HTN, CKD 3, hypothyroidism, peripheral vascular disease and GERD who presented to the emergency department via EMS after family found him lying on the floor and felt he had a change in mental status. Per EMS report, patient is well-known to them. At baseline, he is alert and oriented x4 and can answer all questions appropriately. Upon EMS arrival, patient was able to answer questions appropriately however he was slow to respond. Blood glucose 276, temperature 98.9, heart rate 113, oxygen saturation 94% on 3 L, blood pressure 125/70. Patient reported several episodes of nonbilious, nonbloody diarrhea prior to lying on the floor last night. He was then too weak to stand back up. He also endorsed productive cough. Symptoms started 2-3 days ago. Patient denied any loss of consciousness, chest pain or vomiting. He has not been vaccinated against COVID-19. Vitals upon arrival: Blood pressure 126/68, heart rate 111, respiratory rate 23, temperature 98.3, oxygen saturation 95% on room air. Labs remarkable for BUN 29, creatinine 2.2, glucose 257, CRP 44.6, ferritin 672.1, D-dimer greater than 4. COVID-19 detected. Ammonia 37. Ethanol and acetaminophen level undetectable. UA with moderate blood and 100 protein. Urine microscopic with 3-5 fine casts, 3-5 WBCs, 21-50 RBCs, 1+ bacteria and negative leukocyte Estrace. ABG: pH 7.463, PCO2 29.3, PO2 70.4, O2 sat 94.3. Chest x-ray with focal nodular opacity present in the left midlung zone. This is indeterminate. Recommend CT chest with IV contrast for further assessment. CT head with no acute intracranial hemorrhage or mass-effect.   Moderate multifocal white matter disease, most likely reflecting chronic small vessel ischemic changes. CT chest without contrast: (1) Patchy geographic areas of airspace disease in the left upper lobe and left lower lobe suspicious for multifocal left-sided pneumonia. Follow-up chest x-ray in one month recommended to document resolution. (2) Extensive coronary artery calcification. (3) Trace pericardial effusion. Pt was given IVFs, rocephin, azithromycin and then admitted for further care. Past Medical History:      Diagnosis Date    Cataract     DM [facility]di[facility] mellitus) [facility]     Erectile dysfunction     Hard of hearing     hearing aid    High cholesterol     Hypertension     Hypothyroidism     Peripheral vascular disease ([facility] [address] [facility]     Pneumonia    CKD 3    Past Surgical History:      Procedure Laterality Date    CYST REMOVAL      under right arm     EYE SURGERY  01/2012    cataract    FOOT SURGERY      growth removed; left       Medications Prior to Admission:    Prior to Admission medications    Medication Sig Start Date End Date Taking? Authorizing Provider   losartan (COZAAR) 100 MG tablet Take 100 mg by mouth daily   Yes Historical Provider, MD   hydroCHLOROthiazide (HYDRODIURIL) 25 MG tablet Take 25 mg by mouth daily   Yes Historical Provider, MD   rosuvastatin (CRESTOR) 20 MG tablet Take 20 mg by mouth daily   Yes Historical Provider, MD   SITagliptin (JANUVIA) 50 MG tablet Take 50 mg by mouth daily   Yes Historical Provider, MD   omeprazole (PRILOSEC) 20 MG delayed release capsule Take 20 mg by mouth daily   Yes Historical Provider, MD   levothyroxine (SYNTHROID) 50 MCG tablet take 1 tablet by mouth once daily 11/25/12  Yes Mehreen Case MD   cilostazol (PLETAL) 100 MG tablet Take 1 tablet by mouth 2 times daily. 5/31/12  Yes Mehreen Case MD   amlodipine (NORVASC) 5 MG tablet Take 1 tablet by mouth daily.  2/2/12  Yes Mehreen Case MD   furosemide (LASIX) 20 MG tablet take 1 tablet by mouth every other day  Patient taking differently: 20 mg daily  12/9/11  Yes Chantal Lopez [address] [place]       Allergies:  Patient has no known allergies. Social History:   TOBACCO:   reports that he has been smoking cigarettes. He has a 26.50 pack-year smoking history. He has never used smokeless tobacco.  ETOH:   reports no history of alcohol use. OCCUPATION:  None     Family History:       Problem Relation Age of Onset    Stroke Mother     Heart Disease Daughter         MI    Stroke Daughter        Review of system  Constitutional:  Denies fever or chills. Positive for fatigue and poor po intake. Eyes:  Denies eye pain or redness  HENT:  Denies nasal congestion or sore throat   Respiratory:  Positive for productive cough, mild SOA. Cardiovascular:  Denies chest pain or edema   GI:  Denies abdominal pain, nausea, vomiting, bloody stools. Positive for diarrhea. :  Denies dysuria or frequency  Musculoskeletal:  Denies acute neck pain or body aches  Integument:  Denies rash or itching  Neurologic:  Denies headache, dizziness, numbness, tingling or unilateral weakness  Psychiatric:  Denies acute depression or acute anxiety      Vital Signs  Temp: 97.7 °F (36.5 °C)  Pulse: 78  Resp: 18  BP: 120/69  SpO2: 96 %  O2 Device: Nasal cannula  O2 Flow Rate (L/min): 1.5 L/min    vital signs reviewed in electronic chart. Physical exam  Constitutional:  Well developed, well nourished, elderly male laying in bed in no acute distress. [place] [facility]. Eyes:  no scleral icterus, conjunctiva normal   HENT:  Atraumatic, external ears normal, nose normal, oropharynx moist, no pharyngeal exudates. Neck- supple, no JVD, no lymphadenopathy  Respiratory:  No respiratory distress, no wheezing, no rales detected. Decreased air movement throughout. Scattered rhonchi. Cardiovascular:  Normal rate, normal rhythm, no murmurs, no gallops, no rubs, no edema   GI:  Soft, nondistended, normal bowel sounds, nontender, no voluntary guarding  Musculoskeletal:  No cyanosis or obvious acute deformity.  Moving all extremities Integument:  Warm and dry. Lymphatic:  No cervical or axillary lymphadenopathy noted   Neurologic:  Alert & oriented x 3, no apparent focal deficits noted   Psychiatric:  Speech and behavior appropriate         Lab Results   Component Value Date     02/01/2022    K 4.0 02/01/2022     02/01/2022    CO2 21 02/01/2022    BUN 30 (H) 02/01/2022    CREATININE 1.6 (H) 02/01/2022    GLUCOSE 194 (H) 02/01/2022    CALCIUM 9.2 02/01/2022    PROT 5.9 (L) 02/01/2022    LABALBU 3.5 02/01/2022    BILITOT 0.4 02/01/2022    ALKPHOS 51 02/01/2022    AST 29 02/01/2022    ALT 15 02/01/2022    LABGLOM 42 (L) 02/01/2022    GFRAA 50 (L) 02/01/2022    AGRATIO 1.5 02/01/2022    GLOB 2.4 02/01/2022           Lab Results   Component Value Date    WBC 7.4 02/01/2022    HGB 15.4 02/01/2022    HCT 45.4 02/01/2022    MCV 89.2 02/01/2022     (L) 02/01/2022       PA/lat CXR:   CT CHEST WO CONTRAST   Final Result      1. Patchy geographic areas of airspace disease in the left upper lobe and left lower lobe suspicious for multifocal left-sided pneumonia. Follow-up chest x-ray in one month recommended to document resolution. 2. Extensive coronary artery calcification. 3. Trace pericardial effusion. CT Head WO Contrast   Final Result      No acute intracranial hemorrhage or mass effect. Moderate multifocal white matter disease, most likely reflecting chronic small vessel ischemic changes. XR CHEST PORTABLE   Final Result      Focal nodular opacity is present in the left midlung zone. This is indeterminate. Recommend chest CT with intravenous contrast for further assessment. EKG:  Sinus tachycardia, heart rate 112 bpm, left anterior fascicular block, no ST-T wave changes    Assessment and Plan     Active Hospital Problems    Diagnosis Date Noted    Vitamin D deficiency [E55.9]  -vitamin d 7.7 on 2/1  -add vitamin d supplement 2/1 02/01/2022    Acute kidney injury superimposed on CKD ([address] [N17.9, N18.9]  -sCr 2.2 on 1/31; hydrated with IVFs with improvement to 1.6 today  -suspect MARIE due to prerenal injury from diarrhea  -per MR review, sCr 1.81 on most recent labs from 9/9/21  -MARIE resolved  -avoid NSAIDs and nephrotoxins  -encourage good po intake   02/01/2022    Thrombocytopenia (HCC) [D69.6]  -plt 125 on 1/31; 121 on 2/1  -per chart review, plts 281 on 9/9/21  -no hx of liver disease  -monitor   02/01/2022    Acute hypoxemic respiratory failure due to COVID-19 ([facility] [address] [U07.1, J96.01]  -tested positive 1/31; reports onset of symptoms 2-3 days prior  -unvaccinated  -CXR 1/31: focal nodular opacity present in the left midlung zone. -CT head with no acute intracranial hemorrhage or mass-effect. Moderate multifocal white matter disease, most likely reflecting chronic small vessel ischemic changes. CT chest without contrast: (1) Patchy geographic areas of airspace disease in the left upper lobe and left lower lobe suspicious for multifocal left-sided pneumonia. Follow-up chest x-ray in one month recommended to document resolution. (2) Extensive coronary artery calcification. (3) Trace pericardial effusion.   -CRP 44.6 on 1/31; 128.3 on 2/1  -ferritin 672.1 on 1/31; 702.7 on 2/1  -Ddimer >4.00 on 1/31  -proBNP 342 on 1/31  -treating with baricitinib, remdesivir, azithromycin, rocephin, decadron, vitamin supplementation, mucinex, PRN robitussin, lovenox 30mg BID and albuterol inhaler  -add acapella device  -encourage IS and ambulation  -continue supplemental O2 to maintain sats >90%; pt currently requiring 1.5-2L NC; at baseline, he does not use O2   01/31/2022    Diabetes mellitus, type II ([facility] [address] [E11.9]  -A1c 8.8  -Home regimen: Januvia 50 mg daily, Metformin 850 mg twice daily and Actos 30 mg daily  -Metformin and Actos on hold secondary to MARIE  -Blood glucose is currently controlled with alogliptin and sliding scale insulin  -Continue to monitor and adjust as needed   03/28/2011   Destini Larger Hypothyroidism [E03.9]  -TSH 0.41  -continue home synthroid   03/28/2011    Hyperlipidemia [E78.5]  -Home Lipitor and TriCor on hold in setting of Covid   03/28/2011    HTN (hypertension) [I10]  -BP currently normotensive without home lasix and norvasc  -Continue to hold Norvasc and Lasix and resume when appropriate    Generalized weakness  -Suspect secondary to acute illness  -Continue treatment as outlined above  -PT OT consulted    Acute encephalopathy  -Suspect secondary to acute illness; mental status at baseline today  -Continue treatment as outlined above  -Resolved    Diarrhea  -Check C. difficile and GI panel PCR  -Patient received 1 L IV fluids on admission. IV fluids now discontinued. Encourage good p.o. intake. 03/28/2011     Patient was seen and examined by Dr. Nelly Purvis and plan of care reviewed. Treatment plan was formulated collaboratively.       MORRIS See certifies per CMS regulation for 42 .15(a), that the patient may reasonably be expected to be discharged or transferred to a hospital within 96 hours after admission to [address] [place] [address]    Electronically signed by MORRIS See on 2/1/2022 at 12:33 PM

## 2022-02-01 NOTE — PROGRESS NOTES
Med rec performed utilizing fill history from [facility]. Losartan-HCTZ was changed from the combination med to the individual meds per fill history.             Omkar Henry MA

## 2022-02-01 NOTE — PLAN OF CARE
Problem: Airway Clearance - Ineffective  Goal: Achieve or maintain patent airway  Outcome: Ongoing     Problem: Gas Exchange - Impaired  Goal: Absence of hypoxia  Outcome: Ongoing  Goal: Promote optimal lung function  Outcome: Ongoing     Problem: Breathing Pattern - Ineffective  Goal: Ability to achieve and maintain a regular respiratory rate  Outcome: Ongoing     Problem:  Body Temperature -  Risk of, Imbalanced  Goal: Ability to maintain a body temperature within defined limits  Outcome: Ongoing  Goal: Will regain or maintain usual level of consciousness  Outcome: Ongoing  Goal: Complications related to the disease process, condition or treatment will be avoided or minimized  Outcome: Ongoing     Problem: Isolation Precautions - Risk of Spread of Infection  Goal: Prevent transmission of infection  Outcome: Ongoing     Problem: Nutrition Deficits  Goal: Optimize nutritional status  Outcome: Ongoing     Problem: Risk for Fluid Volume Deficit  Goal: Maintain normal heart rhythm  Outcome: Ongoing  Goal: Maintain absence of muscle cramping  Outcome: Ongoing  Goal: Maintain normal serum potassium, sodium, calcium, phosphorus, and pH  Outcome: Ongoing     Problem: Loneliness or Risk for Loneliness  Goal: Demonstrate positive use of time alone when socialization is not possible  Outcome: Ongoing     Problem: Fatigue  Goal: Verbalize increase energy and improved vitality  Outcome: Ongoing     Problem: Patient Education: Go to Patient Education Activity  Goal: Patient/Family Education  Outcome: Ongoing     Problem: Falls - Risk of:  Goal: Will remain free from falls  Outcome: Ongoing  Goal: Absence of physical injury  Outcome: Ongoing     Problem: Diarrhea:  Goal: Bowel elimination is within specified parameters  Outcome: Ongoing  Goal: Passage of soft, formed stool  Outcome: Ongoing  Goal: Establishment of normal bowel function will improve to within specified parameters  Outcome: Ongoing     Problem: Skin Integrity:  Goal: Will show no infection signs and symptoms  Outcome: Ongoing  Goal: Absence of new skin breakdown  Outcome: Ongoing

## 2022-02-01 NOTE — PROGRESS NOTES
Physical Therapy    Facility/Department: [facility] MED SURG  Initial Assessment    NAME: Sascha Cruz  : [birth year]  MRN: [id number]    Date of Service: 2022    Discharge Recommendations:  Continue to assess pending progress,Home with assist PRN        Assessment   Body structures, Functions, Activity limitations: Decreased functional mobility ; Decreased high-level IADLs;Decreased endurance  Assessment: Pt performed functional mobility including sitting EOB, standing, and short ambulation by bed side and maintain SpO2 > 90%. Pt will benefit from skilled PT while in the acute setting to address deficits and restore optimal functional level. Treatment Diagnosis: Functional decline due to COVID-19  Prognosis: Good  Decision Making: Low Complexity  REQUIRES PT FOLLOW UP: Yes  Activity Tolerance  Activity Tolerance: Patient Tolerated treatment well       Patient Diagnosis(es): The encounter diagnosis was Pneumonia due to COVID-19 virus. has a past medical history of Cataract, DM (diabetes mellitus) ([address] Erectile dysfunction, Hard of hearing, High cholesterol, Hypertension, Hypothyroidism, Peripheral vascular disease ([address] and Pneumonia. has a past surgical history that includes cyst removal; Foot surgery; and eye surgery (2012). Restrictions  Restrictions/Precautions  Restrictions/Precautions: Isolation,Fall Risk,General Precautions     Vision/Hearing  Vision: Impaired  Vision Exceptions: Wears glasses at all times  Hearing: Exceptions to [facility]  Hearing Exceptions: Left hearing aid       Subjective  General  Chart Reviewed: Yes  Patient assessed for rehabilitation services?: Yes  Referring Practitioner: Anna Lopes PA-C  Referral Date : 22  Diagnosis: Acute hypoxemic respiratory failure due to COVID 19  Subjective  Subjective: Pt presents supine in bed, agreeable to PT evaluation. Pt states he is feeling much better and not having pain currently.   Pain Screening  Patient Currently in Pain: No  Vital Signs  Patient Currently in Pain: No       Orientation  Orientation  Overall Orientation Status: Within Normal Limits     Social/Functional History  Social/Functional History  Lives With: Spouse  Type of Home: House  Home Layout: One level  Home Access: Stairs to enter without rails  Entrance Stairs - Number of Steps: one step to enter.   Pt states he is going to get a handrail for it because he has fell there in the past  Bathroom Shower/Tub: Tub/Shower unit  Bathroom Toilet: Standard  Bathroom Equipment: Grab bars in shower,Grab bars around toilet  Home Equipment: Cane (uses cane occasionally)  Receives Help From: Family  ADL Assistance: Independent  Homemaking Assistance: Independent (pt does all the cooking at home)  [employer] Responsibilities: Yes  Ambulation Assistance: Independent  Transfer Assistance: Independent  Active : Yes  Leisure & Hobbies: like to do dirt [employer] race car driving    Objective     Observation/Palpation  Observation: 2L O2, NAD, pleasant and agreeable    AROM RLE (degrees)  RLE AROM: WNL  AROM LLE (degrees)  LLE AROM : WNL     Strength RLE  Strength RLE: WFL  Strength LLE  Strength LLE: WFL     Tone RLE  RLE Tone: Normotonic  Tone LLE  LLE Tone: Normotonic     Bed mobility  Rolling to Left: Modified independent  Supine to Sit: Modified independent  Sit to Supine: Modified independent  Scooting: Modified independent     Transfers  Sit to Stand: Stand by assistance  Stand to sit: Stand by assistance     Ambulation  Ambulation?: Yes  Ambulation 1  Surface: level tile  Device: No Device  Assistance: Contact guard assistance  Gait Deviations: Decreased step length;Decreased step height  Distance: 3 steps forward and 3 steps back x 2 trials due to short O2 hose     Balance  Posture: Good  Sitting - Static: Good  Sitting - Dynamic: Good  Standing - Static: Good  Standing - Dynamic: Good;-        Plan   Plan  Times per week: 4-5 days per week  Times per day: Daily  Plan weeks: 1  Current Treatment Recommendations: Strengthening,Transfer Training,Endurance Training,Patient/Caregiver Education & Training,Equipment Evaluation, Education, & procurement,ROM,Balance Training,Safety Education & Training,Functional Mobility Training  Safety Devices  Type of devices: Left in bed,Call light within reach      Goals  Short term goals  Time Frame for Short term goals: 1 week  Short term goal 1: Pt to transfer bed to chair x Mod with good safety awareness. Short term goal 2: Pt to ambulate 100 feet or greater with cane or RW x SBA and maintain SpO2 > 90%  Short term goal 3: Pt to be able to sit up in a chair for 2-3 hours with good tolerance. Patient Goals   Patient goals : gain strength       Therapy Time   Individual Concurrent Group Co-treatment   Time In 2695         Time Out 80         Minutes 27                 Meme Bejarano, PT     This note serves as D/C summary if patient is discharged prior to next visit.

## 2022-02-01 NOTE — FLOWSHEET NOTE
02/01/22 0954   Assessment   Charting Type Shift assessment   Neurological   Level of Consciousness Alert (0)   Milo Coma Scale   Eye Opening 4   Best Verbal Response 5   Best Motor Response 6   Antonio Coma Scale Score 15   HEENT   HEENT (WDL) X   Right Eye Impaired vision   Left Eye Impaired vision   Right Ear Impaired hearing   Left Ear Impaired hearing   Teeth Missing teeth   Respiratory   Respiratory (WDL) X   Breath Sounds   Right Upper Lobe Coarse Crackles   Right Middle Lobe Diminished   Right Lower Lobe Diminished   Left Upper Lobe Coarse Crackles   Left Lower Lobe Diminished   Cardiac   Cardiac (WDL) WDL   Cardiac Monitor   Telemetry Monitor On Yes   Telemetry Audible Yes   Telemetry Alarms Set Yes   Telemetry Box Number [id number]   Gastrointestinal   Abdominal (WDL) X   Abdomen Inspection Distended;Rounded   RUQ Bowel Sounds Active   LUQ Bowel Sounds Active   RLQ Bowel Sounds Active   LLQ Bowel Sounds Active   Peripheral Vascular   Peripheral Vascular (WDL) WDL   Skin Color/Condition   Skin Color/Condition (WDL) X   Skin Integrity   Skin Integrity (WDL) X   Musculoskeletal   Musculoskeletal (WDL) X   RUE Weakness   LUE Weakness   RL Extremity Weakness; Unsteady   LL Extremity Weakness; Unsteady   Genitourinary   Genitourinary (WDL) X  (incontinent at times)   Anus/Rectum   Anus/Rectum (WDL) WDL   Psychosocial   Psychosocial (WDL) WDL   Pt up in bed. Has productive cough. Clear sputum noted. Alert and oriented, but states that his \"head doesn't feel right. \"  No respiratory distress noted. O2 on at 2L/nc. Instructed pt on covid droplet precautions. Wife delivered pt glasses, masonic ring, dentures and clothes to wear home. Personal items delivered to pt.

## 2022-02-02 LAB
A/G RATIO: 1.3 (ref 0.8–2)
ALBUMIN SERPL-MCNC: 3.4 G/DL (ref 3.4–4.8)
ALP BLD-CCNC: 51 U/L (ref 25–100)
ALT SERPL-CCNC: 15 U/L (ref 4–36)
ANION GAP SERPL CALCULATED.3IONS-SCNC: 16 MMOL/L (ref 3–16)
AST SERPL-CCNC: 25 U/L (ref 8–33)
BILIRUB SERPL-MCNC: 0.5 MG/DL (ref 0.3–1.2)
BUN BLDV-MCNC: 31 MG/DL (ref 6–20)
CALCIUM SERPL-MCNC: 9.3 MG/DL (ref 8.5–10.5)
CHLORIDE BLD-SCNC: 103 MMOL/L (ref 98–107)
CO2: 21 MMOL/L (ref 20–30)
CREAT SERPL-MCNC: 1.4 MG/DL (ref 0.4–1.2)
GFR AFRICAN AMERICAN: 59
GFR NON-AFRICAN AMERICAN: 49
GLOBULIN: 2.6 G/DL
GLUCOSE BLD-MCNC: 171 MG/DL (ref 74–106)
GLUCOSE BLD-MCNC: 193 MG/DL (ref 74–106)
GLUCOSE BLD-MCNC: 201 MG/DL (ref 74–106)
GLUCOSE BLD-MCNC: 232 MG/DL (ref 74–106)
GLUCOSE BLD-MCNC: 261 MG/DL (ref 74–106)
HCT VFR BLD CALC: 43.9 % (ref 40–54)
HEMOGLOBIN: 14.8 G/DL (ref 13–18)
MCH RBC QN AUTO: 29.7 PG (ref 27–32)
MCHC RBC AUTO-ENTMCNC: 33.7 G/DL (ref 31–35)
MCV RBC AUTO: 88 FL (ref 80–100)
PDW BLD-RTO: 13.5 % (ref 11–16)
PERFORMED ON: ABNORMAL
PLATELET # BLD: 137 K/UL (ref 150–400)
PMV BLD AUTO: 10.3 FL (ref 6–10)
POTASSIUM REFLEX MAGNESIUM: 3.9 MMOL/L (ref 3.4–5.1)
RBC # BLD: 4.99 M/UL (ref 4.5–6)
SODIUM BLD-SCNC: 140 MMOL/L (ref 136–145)
TOTAL PROTEIN: 6 G/DL (ref 6.4–8.3)
WBC # BLD: 6.3 K/UL (ref 4–11)

## 2022-02-02 PROCEDURE — 6370000000 HC RX 637 (ALT 250 FOR IP): Performed by: INTERNAL MEDICINE

## 2022-02-02 PROCEDURE — 2700000000 HC OXYGEN THERAPY PER DAY

## 2022-02-02 PROCEDURE — 2500000003 HC RX 250 WO HCPCS: Performed by: PHYSICIAN ASSISTANT

## 2022-02-02 PROCEDURE — 6360000002 HC RX W HCPCS: Performed by: PHYSICIAN ASSISTANT

## 2022-02-02 PROCEDURE — 99231 SBSQ HOSP IP/OBS SF/LOW 25: CPT | Performed by: INTERNAL MEDICINE

## 2022-02-02 PROCEDURE — 97110 THERAPEUTIC EXERCISES: CPT

## 2022-02-02 PROCEDURE — 97165 OT EVAL LOW COMPLEX 30 MIN: CPT

## 2022-02-02 PROCEDURE — 6360000002 HC RX W HCPCS: Performed by: INTERNAL MEDICINE

## 2022-02-02 PROCEDURE — 36415 COLL VENOUS BLD VENIPUNCTURE: CPT

## 2022-02-02 PROCEDURE — 2580000003 HC RX 258: Performed by: PHYSICIAN ASSISTANT

## 2022-02-02 PROCEDURE — 1200000000 HC SEMI PRIVATE

## 2022-02-02 PROCEDURE — 97530 THERAPEUTIC ACTIVITIES: CPT

## 2022-02-02 PROCEDURE — 85027 COMPLETE CBC AUTOMATED: CPT

## 2022-02-02 PROCEDURE — 80053 COMPREHEN METABOLIC PANEL: CPT

## 2022-02-02 PROCEDURE — 6370000000 HC RX 637 (ALT 250 FOR IP): Performed by: PHYSICIAN ASSISTANT

## 2022-02-02 RX ORDER — DIPHENHYDRAMINE HCL 25 MG
25 TABLET ORAL NIGHTLY PRN
Status: DISCONTINUED | OUTPATIENT
Start: 2022-02-02 | End: 2022-02-03 | Stop reason: HOSPADM

## 2022-02-02 RX ADMIN — Medication 1 UNITS: at 11:12

## 2022-02-02 RX ADMIN — BARICITINIB 2 MG: 2 TABLET, FILM COATED ORAL at 22:06

## 2022-02-02 RX ADMIN — ENOXAPARIN SODIUM 30 MG: 100 INJECTION SUBCUTANEOUS at 08:25

## 2022-02-02 RX ADMIN — REMDESIVIR 100 MG: 100 INJECTION, POWDER, LYOPHILIZED, FOR SOLUTION INTRAVENOUS at 16:30

## 2022-02-02 RX ADMIN — ENOXAPARIN SODIUM 30 MG: 100 INJECTION SUBCUTANEOUS at 22:02

## 2022-02-02 RX ADMIN — ACETAMINOPHEN 650 MG: 325 TABLET, FILM COATED ORAL at 22:47

## 2022-02-02 RX ADMIN — TAMSULOSIN HYDROCHLORIDE 0.4 MG: 0.4 CAPSULE ORAL at 08:25

## 2022-02-02 RX ADMIN — ALOGLIPTIN 6.25 MG: 12.5 TABLET, FILM COATED ORAL at 08:24

## 2022-02-02 RX ADMIN — Medication 3 UNITS: at 16:26

## 2022-02-02 RX ADMIN — Medication 1 UNITS: at 22:03

## 2022-02-02 RX ADMIN — DEXAMETHASONE 6 MG: 4 TABLET ORAL at 08:25

## 2022-02-02 RX ADMIN — CILOSTAZOL 100 MG: 100 TABLET ORAL at 22:01

## 2022-02-02 RX ADMIN — CILOSTAZOL 100 MG: 100 TABLET ORAL at 08:25

## 2022-02-02 RX ADMIN — GUAIFENESIN 600 MG: 600 TABLET, EXTENDED RELEASE ORAL at 22:02

## 2022-02-02 RX ADMIN — GUAIFENESIN 600 MG: 600 TABLET, EXTENDED RELEASE ORAL at 08:25

## 2022-02-02 RX ADMIN — Medication 1000 UNITS: at 08:25

## 2022-02-02 RX ADMIN — ZINC SULFATE 220 MG (50 MG) CAPSULE 50 MG: CAPSULE at 08:25

## 2022-02-02 RX ADMIN — OXYCODONE HYDROCHLORIDE AND ACETAMINOPHEN 500 MG: 500 TABLET ORAL at 08:25

## 2022-02-02 RX ADMIN — DIPHENHYDRAMINE HYDROCHLORIDE 25 MG: 25 TABLET ORAL at 22:47

## 2022-02-02 RX ADMIN — AZITHROMYCIN MONOHYDRATE 250 MG: 250 TABLET ORAL at 08:25

## 2022-02-02 RX ADMIN — Medication 400 MG: at 08:25

## 2022-02-02 RX ADMIN — CEFTRIAXONE 1000 MG: 1 INJECTION, POWDER, FOR SOLUTION INTRAMUSCULAR; INTRAVENOUS at 15:53

## 2022-02-02 RX ADMIN — LEVOTHYROXINE SODIUM 50 MCG: 0.05 TABLET ORAL at 05:36

## 2022-02-02 RX ADMIN — Medication 1 UNITS: at 09:09

## 2022-02-02 RX ADMIN — PANTOPRAZOLE SODIUM 40 MG: 40 TABLET, DELAYED RELEASE ORAL at 08:25

## 2022-02-02 ASSESSMENT — PAIN SCALES - GENERAL: PAINLEVEL_OUTOF10: 6

## 2022-02-02 NOTE — PROGRESS NOTES
Physical Therapy  Facility/Department: [facility] MED SURG  Daily Treatment Note  NAME: Kaitlyn Metzger  :   MRN: [id number]    Date of Service: 2022    Discharge Recommendations:  Continue to assess pending progress,Home with assist PRN      Assessment   Assessment: Patient completed bed mobility tasks with Mod I.  Engaged patient in B LE therex in sitting and standing marching. Patient ambulated in the room ~40 feet with 2LO2, CGA, and no A.D. Patient transferred to the recliner after ambulation. SpO2 ranged % on 2L. REQUIRES PT FOLLOW UP: Yes  Activity Tolerance  Activity Tolerance: Patient Tolerated treatment well     Patient Diagnosis(es): The encounter diagnosis was Pneumonia due to COVID-19 virus. has a past medical history of Cataract, DM (diabetes mellitus) ([address] Erectile dysfunction, Hard of hearing, High cholesterol, Hypertension, Hypothyroidism, Peripheral vascular disease ([address] and Pneumonia. has a past surgical history that includes cyst removal; Foot surgery; and eye surgery (2012). Restrictions  Restrictions/Precautions  Restrictions/Precautions: Isolation,Fall Risk,General Precautions  Subjective   General  Chart Reviewed: Yes  Response To Previous Treatment: Patient with no complaints from previous session. Family / Caregiver Present: No  Subjective  Subjective: Patient states he is doing ok and hopes to go home soon.   Pain Screening  Patient Currently in Pain: Denies  Vital Signs  Patient Currently in Pain: Denies       Objective   Bed mobility  Rolling to Left: Modified independent  Supine to Sit: Modified independent  Scooting: Modified independent  Transfers  Sit to Stand: Stand by assistance  Stand to sit: Stand by assistance  Ambulation 1  Surface: level tile  Device: No Device  Other Apparatus: O2  Assistance: Contact guard assistance  Distance: 40 feet in the room     Balance  Posture: Good  Sitting - Static: Good  Sitting - Dynamic: Good  Standing - Static: Good  Standing - Dynamic: Good;-  Exercises  Hip Flexion: 15  Hip Abduction: 15  Knee Long Arc Quad: 15  Ankle Pumps: 15  Comments: Standing marching      Goals  Short term goals  Time Frame for Short term goals: 1 week  Short term goal 1: Pt to transfer bed to chair x Mod with good safety awareness. Short term goal 2: Pt to ambulate 100 feet or greater with cane or RW x SBA and maintain SpO2 > 90%  Short term goal 3: Pt to be able to sit up in a chair for 2-3 hours with good tolerance. Patient Goals   Patient goals : gain strength    Plan    Plan  Times per week: 4-5 days per week  Times per day: Daily  Plan weeks: 1  Current Treatment Recommendations: Chel Guevara Training,Patient/Caregiver Education & Training,Equipment Evaluation, Education, & procurement,ROM,Balance Training,Safety Education & Training,Functional Mobility Training  Safety Devices  Type of devices: Left in chair,Call light within reach     Therapy Time   Individual Concurrent Group Co-treatment   Time In 8849         Time Out 9715         Minutes 32              This note serves as D/C summary if patient is discharged prior to next visit.   Tang Chowdhury, PTA

## 2022-02-02 NOTE — FLOWSHEET NOTE
02/01/22 2144   Assessment   Charting Type Shift assessment   Neurological   Neuro (WDL) X   Level of Consciousness Alert (0)   Swallow Screening   Is the patient able to remain alert for testing? Yes   Was the Patient Eating a Modified Diet Prior to being Admitted? No   Antonio Coma Scale   Eye Opening 4   Best Verbal Response 5   Best Motor Response 6   Antonio Coma Scale Score 15   NIHSS Stroke Scale   NIHSS Stroke Scale Assessed No   HEENT   HEENT (WDL) X   Right Eye Impaired vision   Left Eye Impaired vision   Right Ear Impaired hearing   Left Ear Impaired hearing   Teeth Missing teeth   Respiratory   Respiratory (WDL) X   Breath Sounds   Right Upper Lobe Coarse Crackles   Right Middle Lobe Diminished   Right Lower Lobe Diminished   Left Upper Lobe Coarse Crackles   Left Lower Lobe Diminished   Cardiac   Cardiac (WDL) WDL   Rhythm Interpretation   Pulse 86   Cardiac Monitor   Telemetry Monitor On Yes   Telemetry Audible Yes   Telemetry Alarms Set Yes   Telemetry Box Number [id number]   Gastrointestinal   Abdominal (WDL) X   Abdomen Inspection Rotund   Last BM (including prior to admit) 01/31/22   RUQ Bowel Sounds Active   LUQ Bowel Sounds Active   RLQ Bowel Sounds Active   LLQ Bowel Sounds Active   Peripheral Vascular   Peripheral Vascular (WDL) WDL   Skin Color/Condition   Skin Color/Condition (WDL) X   Skin Color Pale   Skin Condition/Temp Warm;Dry   Skin Integrity   Skin Integrity (WDL) X   Skin Integrity Tear   Location left arm   Skin Integrity Site 2   Skin Integrity Location 2 Other (Comment)  (dry patches)   Location 2 sol[employer] arms   Preventative Dressing No   Assessed this shift? Yes   Musculoskeletal   Musculoskeletal (WDL) X   RUE Weakness   LUE Weakness   RL Extremity Weakness; Unsteady   LL Extremity Weakness; Unsteady   Genitourinary   Genitourinary (WDL) X   Urine Assessment   Incontinence No   Urine Color Elisa   Urine Appearance Clear   Urine Odor No odor   Anus/Rectum   Anus/Rectum (WDL) WDL Psychosocial   Psychosocial (WDL) WDL

## 2022-02-02 NOTE — PROGRESS NOTES
Occupational Therapy  Initial Assessment  Date: 2022   Patient Name: Anitra Hannah  MRN: [id number]     : 1940    Date of Service: 2022  Discharge Recommendations: Pending progress    Assessment  Pt seen for skilled OT assessment. Pt mildly unsteady during ambulation w/o device, O2 >90% on 1L O2. Pt is a good candidate to benefit from interventions on an IP basis. Treatment Diagnosis: Functional decline due to COVID-19  Prognosis: Good  Decision Making: Low Complexity  REQUIRES PT FOLLOW UP: Yes  Activity Tolerance: Patient Tolerated treatment well     Patient Diagnosis(es): The encounter diagnosis was Pneumonia due to COVID-19 virus. has a past medical history of Cataract, DM (diabetes mellitus) ([address] Erectile dysfunction, Hard of hearing, High cholesterol, Hypertension, Hypothyroidism, Peripheral vascular disease ([facility] [address] and Pneumonia. has a past surgical history that includes cyst removal; Foot surgery; and eye surgery (2012). Restrictions  Restrictions/Precautions: Isolation,Fall Risk,General Precautions    Subjective  Chart Reviewed: Yes  Patient assessed for rehabilitation services?: Yes  Referring Practitioner: Joseph Castro PA-C  Referral Date : 22  Diagnosis: Acute hypoxemic respiratory failure due to COVID 19  Subjective: Pt supine in bed, agreeable to OT evaluation. Pt states he is feeling much better & not having pain currently. Patient Currently in Pain: No    Social/Functional History  Lives With: Spouse  Type of Home: House  Home Layout: One level  Home Access: Stairs to enter without rails  Entrance Stairs - Number of Steps: one step to enter.   Pt states he is going to get a handrail for it because he has fell there in the past  Bathroom Shower/Tub: Tub/Shower unit  [employer]: Standard  Bathroom Equipment: Grab bars in shower,Grab bars around toilet  Home Equipment: [employer] (uses cane occasionally)  Receives Help From: Family  ADL Assistance: Independent  Homemaking Assistance: Independent (pt does all the cooking at home)  Homemaking Responsibilities: Yes  Ambulation Assistance: Independent  Transfer Assistance: Independent  Active : Yes  Leisure & Hobbies: like to do dirt track race car driving    Objective  Observation: Pt supine in bed, NAD, Pleasant & cooperative, Agreeable to OT services, 2L O2  Bed Mobility: Modified independent  Sit to Stand: Stand by assistance  Stand to sit: Stand by assistance   Ambulation: No Device: Contact guard assistance; ~ 100ft 3x  Posture: Good  Sitting Balance: Good  Standing Balance: Fair    UE AROM: WFL  UE Strength: WFL  Tone: Normotonic    Plan  Times per week: 3-5   Times per day: Daily  Planned weeks: 1 week    Goals  1 Pt to be I w HEP  2 Pt to perform shower w Mod I  3 Pt to perform toilet transfer w Mod I  4 Pt to tolerate 15 minutes of activity w O2 >90%    Therapy Time   Individual   Time In 1006   Time Out 1136   Minutes 35     Zara Allison, OTR/L  This note serves as D/C summary if patient is discharged prior to next visit. Certification of Medical Necessity: It will be understood that this treatment plan is certified medically necessary by the documenting therapist and referring physician mentioned in this report. Unless the physician indicated otherwise through written correspondence with our office, all further referrals will act as certification of medical necessity on this treatment plan. Thank you for this referral.  If you have questions regarding this plan of care, please call [phone].

## 2022-02-02 NOTE — PROGRESS NOTES
Progress Note      Subjective:   Chief complaint:   Found down on the floor  Diarrhea  Acute encephalopathy    Interval History:   Pt seen and examined this afternoon. Reports feeling about the same. Continues to have productive cough. Using IS and flutter valve frequently. Review of systems:   Constitutional:  Denies fever or chills. Positive for fatigue and poor po intake. Eyes:  Denies eye pain or redness  HENT:  Denies nasal congestion or sore throat   Respiratory:  Positive for productive cough, mild SOA. Cardiovascular:  Denies chest pain or edema   GI:  Denies abdominal pain, nausea, vomiting, bloody stools. Positive for diarrhea. :  Denies dysuria or frequency  Musculoskeletal:  Denies acute neck pain or body aches  Integument:  Denies rash or itching  Neurologic:  Denies headache, dizziness, numbness, tingling or unilateral weakness  Psychiatric:  Denies acute depression or acute anxiety    Past medical history, surgical history, family history and social history reviewed and unchanged compared to H&P earlier this admission.     Medications:   Scheduled Meds:   Vitamin D  1,000 Units Oral Daily    cilostazol  100 mg Oral BID    levothyroxine  50 mcg Oral Daily    pantoprazole  40 mg Oral Daily    alogliptin  6.25 mg Oral Daily    enoxaparin  30 mg SubCUTAneous BID    remdesivir IVPB  100 mg IntraVENous Q24H    baricitinib  2 mg Oral Nightly    cefTRIAXone (ROCEPHIN) IV  1,000 mg IntraVENous Q24H    azithromycin  250 mg Oral Daily    ascorbic acid  500 mg Oral Daily    zinc sulfate  50 mg Oral Daily    magnesium oxide  400 mg Oral Daily    guaiFENesin  600 mg Oral BID    insulin lispro  0-6 Units SubCUTAneous TID WC    insulin lispro  0-3 Units SubCUTAneous Nightly    dexamethasone  6 mg Oral Daily    nicotine  1 patch TransDERmal Daily    tamsulosin  0.4 mg Oral Daily     Continuous Infusions:   dextrose         Objective:     Vital Signs  Temp: 97.7 °F (36.5 °C)  Pulse: (!) 44  Resp: 18  BP: 118/62  SpO2: 98 %  O2 Device: Nasal cannula  O2 Flow Rate (L/min): 2 L/min    Vital signs reviewed in electronic charts. Physical exam  Constitutional:  Well developed, well nourished, elderly male laying in bed in no acute distress. [place]. Eyes:  no scleral icterus, conjunctiva normal   HENT:  Atraumatic, external ears normal, nose normal, oropharynx moist, no pharyngeal exudates. Neck- supple, no JVD, no lymphadenopathy  Respiratory:  No respiratory distress, no wheezing, no rales detected. Decreased air movement throughout. Scattered rhonchi. Cardiovascular:  Normal rate, normal rhythm, no murmurs, no gallops, no rubs, no edema   GI:  Soft, nondistended, normal bowel sounds, nontender, no voluntary guarding  Musculoskeletal:  No cyanosis or obvious acute deformity. Moving all extremities   Integument:  Warm and dry. Lymphatic:  No cervical or axillary lymphadenopathy noted   Neurologic:  Alert & oriented x 3, no apparent focal deficits noted   Psychiatric:  Speech and behavior appropriate     Results:     Lab Results   Component Value Date    WBC 6.3 02/02/2022    HGB 14.8 02/02/2022    HCT 43.9 02/02/2022    MCV 88.0 02/02/2022     (L) 02/02/2022       Lab Results   Component Value Date     02/02/2022    K 3.9 02/02/2022     02/02/2022    CO2 21 02/02/2022    BUN 31 02/02/2022    CREATININE 1.4 02/02/2022    GLUCOSE 201 02/02/2022    CALCIUM 9.3 02/02/2022        Assessment and Plan:      Active Hospital Problems     Diagnosis Date Noted    Vitamin D deficiency [E55.9]  -vitamin d 7.7 on 2/1  -add vitamin d supplement 2/1 02/01/2022    Acute kidney injury superimposed on CKD ([facility] [address] [N17.9, N18.9]  -sCr 2.2 on 1/31; 1.6 on 2/1; 1.4 on 2/2 after IVFs  -suspect MARIE due to prerenal injury from diarrhea  -per MR review, sCr 1.81 on most recent labs from 9/9/21  -MARIE resolved  -avoid NSAIDs and nephrotoxins  -encourage good po intake    02/01/2022    Thrombocytopenia and resume when appropriate     Generalized weakness  -Suspect secondary to acute illness  -Continue treatment as outlined above  -PT OT following     Acute encephalopathy  -Suspect secondary to acute illness; mental status at baseline since 2/1  -Continue treatment as outlined above  -Resolved     Diarrhea  -Check C. difficile and GI panel PCR if pt able to provide a sample  -Patient received 1 L IV fluids on admission. IVFs discontinued on 2/1. Encourage good p.o. intake. Patient was seen and examined by Dr. Eliud Craft and plan of care reviewed. Treatment plan was formulated collaboratively.       Electronically signed by MORRIS Prado on 2/2/2022 at 12:23 PM

## 2022-02-03 VITALS
BODY MASS INDEX: 30.06 KG/M2 | RESPIRATION RATE: 18 BRPM | DIASTOLIC BLOOD PRESSURE: 63 MMHG | HEIGHT: 70 IN | HEART RATE: 83 BPM | SYSTOLIC BLOOD PRESSURE: 118 MMHG | OXYGEN SATURATION: 94 % | WEIGHT: 210 LBS | TEMPERATURE: 97.5 F

## 2022-02-03 LAB
A/G RATIO: 1.3 (ref 0.8–2)
ALBUMIN SERPL-MCNC: 3.2 G/DL (ref 3.4–4.8)
ALP BLD-CCNC: 47 U/L (ref 25–100)
ALT SERPL-CCNC: 16 U/L (ref 4–36)
ANION GAP SERPL CALCULATED.3IONS-SCNC: 12 MMOL/L (ref 3–16)
AST SERPL-CCNC: 25 U/L (ref 8–33)
BILIRUB SERPL-MCNC: 0.5 MG/DL (ref 0.3–1.2)
BUN BLDV-MCNC: 30 MG/DL (ref 6–20)
C-REACTIVE PROTEIN: 22.5 MG/L (ref 0–5.1)
CALCIUM SERPL-MCNC: 9.3 MG/DL (ref 8.5–10.5)
CHLORIDE BLD-SCNC: 104 MMOL/L (ref 98–107)
CO2: 20 MMOL/L (ref 20–30)
CREAT SERPL-MCNC: 1.3 MG/DL (ref 0.4–1.2)
D DIMER: >4 UG/ML FEU (ref 0–0.6)
FERRITIN: 637.9 NG/ML (ref 22–322)
GFR AFRICAN AMERICAN: >59
GFR NON-AFRICAN AMERICAN: 53
GLOBULIN: 2.4 G/DL
GLUCOSE BLD-MCNC: 164 MG/DL (ref 74–106)
GLUCOSE BLD-MCNC: 179 MG/DL (ref 74–106)
GLUCOSE BLD-MCNC: 186 MG/DL (ref 74–106)
HCT VFR BLD CALC: 40.9 % (ref 40–54)
HEMOGLOBIN: 14 G/DL (ref 13–18)
MCH RBC QN AUTO: 29.9 PG (ref 27–32)
MCHC RBC AUTO-ENTMCNC: 34.2 G/DL (ref 31–35)
MCV RBC AUTO: 87.4 FL (ref 80–100)
PDW BLD-RTO: 13.3 % (ref 11–16)
PERFORMED ON: ABNORMAL
PERFORMED ON: ABNORMAL
PLATELET # BLD: 146 K/UL (ref 150–400)
PMV BLD AUTO: 9.7 FL (ref 6–10)
POTASSIUM REFLEX MAGNESIUM: 3.8 MMOL/L (ref 3.4–5.1)
RBC # BLD: 4.68 M/UL (ref 4.5–6)
SODIUM BLD-SCNC: 136 MMOL/L (ref 136–145)
TOTAL PROTEIN: 5.6 G/DL (ref 6.4–8.3)
WBC # BLD: 5.3 K/UL (ref 4–11)

## 2022-02-03 PROCEDURE — 6370000000 HC RX 637 (ALT 250 FOR IP): Performed by: PHYSICIAN ASSISTANT

## 2022-02-03 PROCEDURE — 99238 HOSP IP/OBS DSCHRG MGMT 30/<: CPT | Performed by: INTERNAL MEDICINE

## 2022-02-03 PROCEDURE — 36415 COLL VENOUS BLD VENIPUNCTURE: CPT

## 2022-02-03 PROCEDURE — 85027 COMPLETE CBC AUTOMATED: CPT

## 2022-02-03 PROCEDURE — 94618 PULMONARY STRESS TESTING: CPT

## 2022-02-03 PROCEDURE — 86140 C-REACTIVE PROTEIN: CPT

## 2022-02-03 PROCEDURE — 85379 FIBRIN DEGRADATION QUANT: CPT

## 2022-02-03 PROCEDURE — 80053 COMPREHEN METABOLIC PANEL: CPT

## 2022-02-03 PROCEDURE — 2700000000 HC OXYGEN THERAPY PER DAY

## 2022-02-03 PROCEDURE — 6360000002 HC RX W HCPCS: Performed by: INTERNAL MEDICINE

## 2022-02-03 PROCEDURE — 6360000002 HC RX W HCPCS: Performed by: PHYSICIAN ASSISTANT

## 2022-02-03 PROCEDURE — 82728 ASSAY OF FERRITIN: CPT

## 2022-02-03 RX ORDER — DEXAMETHASONE 6 MG/1
6 TABLET ORAL DAILY
Qty: 6 TABLET | Refills: 0 | Status: SHIPPED | OUTPATIENT
Start: 2022-02-04 | End: 2022-02-10

## 2022-02-03 RX ORDER — LANOLIN ALCOHOL/MO/W.PET/CERES
400 CREAM (GRAM) TOPICAL DAILY
Qty: 30 TABLET | Refills: 0 | Status: ON HOLD | OUTPATIENT
Start: 2022-02-04 | End: 2022-04-04

## 2022-02-03 RX ORDER — CEFDINIR 300 MG/1
300 CAPSULE ORAL 2 TIMES DAILY
Qty: 12 CAPSULE | Refills: 0 | Status: SHIPPED | OUTPATIENT
Start: 2022-02-03 | End: 2022-02-09

## 2022-02-03 RX ORDER — TAMSULOSIN HYDROCHLORIDE 0.4 MG/1
0.4 CAPSULE ORAL DAILY
Qty: 30 CAPSULE | Refills: 3 | Status: SHIPPED | OUTPATIENT
Start: 2022-02-04 | End: 2022-06-13

## 2022-02-03 RX ORDER — ASCORBIC ACID 500 MG
500 TABLET ORAL DAILY
Qty: 30 TABLET | Refills: 0 | Status: ON HOLD | OUTPATIENT
Start: 2022-02-04 | End: 2022-04-04

## 2022-02-03 RX ORDER — ALBUTEROL SULFATE 90 UG/1
2 AEROSOL, METERED RESPIRATORY (INHALATION) EVERY 6 HOURS PRN
Qty: 18 G | Refills: 3 | Status: SHIPPED | OUTPATIENT
Start: 2022-02-03 | End: 2022-04-18

## 2022-02-03 RX ORDER — ZINC SULFATE 50(220)MG
50 CAPSULE ORAL DAILY
Qty: 30 CAPSULE | Refills: 0 | Status: ON HOLD | COMMUNITY
Start: 2022-02-04 | End: 2022-04-04

## 2022-02-03 RX ORDER — GUAIFENESIN 600 MG/1
600 TABLET, EXTENDED RELEASE ORAL 2 TIMES DAILY
Qty: 12 TABLET | Refills: 0 | Status: SHIPPED | OUTPATIENT
Start: 2022-02-03 | End: 2022-02-09

## 2022-02-03 RX ORDER — VITAMIN B COMPLEX
1000 TABLET ORAL DAILY
Qty: 60 TABLET | Refills: 0 | Status: ON HOLD | OUTPATIENT
Start: 2022-02-04 | End: 2022-04-04

## 2022-02-03 RX ORDER — GUAIFENESIN/DEXTROMETHORPHAN 100-10MG/5
5 SYRUP ORAL EVERY 4 HOURS PRN
Qty: 120 ML | Refills: 0 | Status: SHIPPED | OUTPATIENT
Start: 2022-02-03 | End: 2022-02-13

## 2022-02-03 RX ORDER — LEVOTHYROXINE SODIUM 0.05 MG/1
TABLET ORAL
Qty: 30 TABLET | Refills: 1 | Status: SHIPPED | OUTPATIENT
Start: 2022-02-03 | End: 2022-07-22 | Stop reason: ALTCHOICE

## 2022-02-03 RX ADMIN — ENOXAPARIN SODIUM 30 MG: 100 INJECTION SUBCUTANEOUS at 07:51

## 2022-02-03 RX ADMIN — ALOGLIPTIN 6.25 MG: 12.5 TABLET, FILM COATED ORAL at 07:51

## 2022-02-03 RX ADMIN — AZITHROMYCIN MONOHYDRATE 250 MG: 250 TABLET ORAL at 07:51

## 2022-02-03 RX ADMIN — CILOSTAZOL 100 MG: 100 TABLET ORAL at 07:51

## 2022-02-03 RX ADMIN — TAMSULOSIN HYDROCHLORIDE 0.4 MG: 0.4 CAPSULE ORAL at 07:51

## 2022-02-03 RX ADMIN — Medication 1 UNITS: at 07:52

## 2022-02-03 RX ADMIN — OXYCODONE HYDROCHLORIDE AND ACETAMINOPHEN 500 MG: 500 TABLET ORAL at 07:50

## 2022-02-03 RX ADMIN — LEVOTHYROXINE SODIUM 50 MCG: 0.05 TABLET ORAL at 06:47

## 2022-02-03 RX ADMIN — Medication 400 MG: at 07:50

## 2022-02-03 RX ADMIN — PANTOPRAZOLE SODIUM 40 MG: 40 TABLET, DELAYED RELEASE ORAL at 07:51

## 2022-02-03 RX ADMIN — Medication 1000 UNITS: at 07:51

## 2022-02-03 RX ADMIN — ZINC SULFATE 220 MG (50 MG) CAPSULE 50 MG: CAPSULE at 07:50

## 2022-02-03 RX ADMIN — DEXAMETHASONE 6 MG: 4 TABLET ORAL at 07:50

## 2022-02-03 RX ADMIN — GUAIFENESIN 600 MG: 600 TABLET, EXTENDED RELEASE ORAL at 07:51

## 2022-02-03 NOTE — PROGRESS NOTES
Iv access removed. I did review pt d/c plan with him and scripts, f/u with richard pineda. He is aware that his family is in the parking lot.   He is currently getting dressed and hid belongings together and will ring when he is ready to go

## 2022-02-03 NOTE — PROGRESS NOTES
6 MINUTE WALK TEST    Exercise type: In pts room without assistance. O2 sat RA/O2LPM RR BOB HR BP   Rest 94 RA 16 0 98    1 min 94 RA  0 99    2 min 93 RA 18 1 103    3 min 93 RA  1 109    4 min 94 RA 18 2 110    5 min 92 RA  2 110    6 min 93 RA 18 2 111    Recovery 95 RA  0 102      Distance walked: 150 feet    Breath sounds/patterns:  Clear / Diminished    Comments: Patient does not meet criteria for home supplemental oxygen.

## 2022-02-03 NOTE — DISCHARGE SUMMARY
Discharge Summary      Patient ID: Herberth Holcomb      Patient's PCP: Sven Valentine, APRN - CNP    Admit Date: 1/31/2022     Discharge Date:  2/3/2022    Admitting Provider: Nico Chu MD    Discharging Provider: MORRIS Ratliff     Reason for this admission:   Acute hypoxic respiratory failure due to COVID pneumonia    Discharge Diagnoses: Active Hospital Problems    Diagnosis Date Noted    Vitamin D deficiency [E55.9] 02/01/2022    Acute kidney injury superimposed on CKD ([address] [N17.9, N18.9] 02/01/2022    Thrombocytopenia ([address] [D69.6] 02/01/2022    Generalized weakness [R53.1] 02/01/2022    Acute encephalopathy [G93.40] 02/01/2022    Diarrhea [R19.7] 02/01/2022    Acute hypoxemic respiratory failure due to COVID-19 ([address] [U07.1, J96.01] 01/31/2022    Diabetes mellitus, type II ([address] [E11.9] 03/28/2011    Hypothyroidism [E03.9] 03/28/2011    Hyperlipidemia [E78.5] 03/28/2011    HTN (hypertension) [I10] 03/28/2011       Procedures:  CT CHEST WO CONTRAST   Final Result      1. Patchy geographic areas of airspace disease in the left upper lobe and left lower lobe suspicious for multifocal left-sided pneumonia. Follow-up chest x-ray in one month recommended to document resolution. 2. Extensive coronary artery calcification. 3. Trace pericardial effusion. CT Head WO Contrast   Final Result      No acute intracranial hemorrhage or mass effect. Moderate multifocal white matter disease, most likely reflecting chronic small vessel ischemic changes. XR CHEST PORTABLE   Final Result      Focal nodular opacity is present in the left midlung zone. This is indeterminate. Recommend chest CT with intravenous contrast for further assessment.             Consults:   IP CONSULT TO CASE MANAGEMENT  PT OT    Briefly:   [de-identified] y.o. male with PMH of diabetes mellitus type 2, hard of hearing, HLD, HTN, CKD 3, hypothyroidism, peripheral vascular disease and GERD admitted for acute hypoxic respiratory failure due to COVID pneumonia. Hospital Course: Active Hospital Problems     Diagnosis Date Noted    Vitamin D deficiency [E55.9]  -vitamin d 7.7 on 2/1  -add vitamin d supplement 2/1 02/01/2022    Acute kidney injury superimposed on CKD ([address] [N17.9, N18.9]  -sCr 2.2 on 1/31; 1.6 on 2/1; 1.4 on 2/2 after IVFs  -suspect MARIE due to prerenal injury from diarrhea  -per MR review, sCr 1.81 on most recent labs from 9/9/21  -MARIE resolved  -avoid NSAIDs and nephrotoxins  -encourage good po intake    02/01/2022    Thrombocytopenia ([address] [D69.6]  -plt 125 on 1/31; 121 on 2/1  -per chart review, plts 281 on 9/9/21  -no hx of liver disease  -monitor    02/01/2022    Acute hypoxemic respiratory failure due to COVID-19 ([address] [U07.1, J96.01]  -tested positive 1/31; reports onset of symptoms 2-3 days prior  -unvaccinated  -CXR 1/31: focal nodular opacity present in the left midlung zone. -CT head with no acute intracranial hemorrhage or mass-effect.  Moderate multifocal white matter disease, most likely reflecting chronic small vessel ischemic changes.  CT chest without contrast: (1) Patchy geographic areas of airspace disease in the left upper lobe and left lower lobe suspicious for multifocal left-sided pneumonia. Follow-up chest x-ray in one month recommended to document resolution. (2) Extensive coronary artery calcification. (3) Trace pericardial effusion. -CRP 44.6 on 1/31; 128.3 on 2/1  -ferritin 672.1 on 1/31; 702.7 on 2/1  -Ddimer >4.00 on 1/31  -proBNP 342 on 1/31  -treated with baricitinib, remdesivir, azithromycin, rocephin, decadron, vitamin supplementation, mucinex, PRN robitussin, lovenox 30mg BID and albuterol inhaler  -continue acapella device while IP.  Will transition to po regimen of omnicef, decadron, vitamin supplements, albuterol inhaler, mucinex and PRN robitussin on discharge.   -encourage IS and ambulation  -maximally required 2L NC during admission; had desat screening on day of dc with no desaturations     01/31/2022    Diabetes mellitus, type II ([facility] [E11.9]  -A1c 8.8  -resume home [place] on dc    03/28/2011    Hypothyroidism [E03.9]  -TSH 0.41  -continue home synthroid    03/28/2011    Hyperlipidemia [E78.5]  -resume home crestor on dc    03/28/2011    HTN (hypertension) [I10]  -resume home norvasc and lasix on dc     Generalized weakness  -Suspect secondary to acute illness  -Continue treatment as outlined above  -PT OT following; pt independent with ADLs     Acute encephalopathy  -Suspect secondary to acute illness; mental status at baseline since 2/1  -Continue treatment as outlined above  -Resolved     Diarrhea  -unable to provide a stool sample  -Patient received 1 L IV fluids on admission.  IVFs discontinued on 2/1. Encourage good p.o. intake. Disposition: home    Discharged Condition: Stable    Vital Signs  Temp: 97.5 °F (36.4 °C)  Pulse: 83  Resp: 18  BP: 118/63  SpO2: 94 %  O2 Device: Nasal cannula  O2 Flow Rate (L/min): 2 L/min    Vital signs reviewed in electronic chart. Physical exam  Constitutional:  Well developed, well nourished, no acute distress. Eyes:  PERRL, conjunctiva normal, sclera without icterus. HENT:  Atraumatic, external ears normal, nose normal, oropharynx moist, no pharyngeal exudates. Neck- supple, no JVD, no lymphadenopathy. Respiratory:  No respiratory distress, no wheezing, rales or rhonchi detected. Cardiovascular:  Normal rate, normal rhythm, no murmurs, no gallops, no rubs. GI:  Soft, nondistended, normal bowel sounds, nontender, no hepatosplenomegaly appreciated. Musculoskeletal:  No edema, cyanosis or obvious acute deformity. Moving all extremities. Integument:  Warm and dry. No rash. Neurologic:  Alert & oriented x 3, no apparent focal deficits noted. Psychiatric:  Speech and behavior appropriate.       Activity: activity as tolerated  Diet: diabetic diet  Follow Up: Primary Care Physician in 2 weeks    Labs: For convenience and continuity at follow-up the following most recent labs are provided:    CBC:   Lab Results   Component Value Date    WBC 5.3 02/03/2022    HGB 14.0 02/03/2022    HCT 40.9 02/03/2022     02/03/2022       RENAL:   Lab Results   Component Value Date     02/03/2022    K 3.8 02/03/2022     02/03/2022    CO2 20 02/03/2022    BUN 30 02/03/2022    CREATININE 1.3 02/03/2022         Discharge Medications:     Current Discharge Medication List           Details   albuterol sulfate  (90 Base) MCG/ACT inhaler Inhale 2 puffs into the lungs every 6 hours as needed for Wheezing  Qty: 18 g, Refills: 3      dexamethasone (DECADRON) 6 MG tablet Take 1 tablet by mouth daily for 6 doses  Qty: 6 tablet, Refills: 0      guaiFENesin (MUCINEX) 600 MG extended release tablet Take 1 tablet by mouth 2 times daily for 6 days  Qty: 12 tablet, Refills: 0      guaiFENesin-dextromethorphan (ROBITUSSIN DM) 100-10 MG/5ML syrup Take 5 mLs by mouth every 4 hours as needed for Cough  Qty: 120 mL, Refills: 0      magnesium oxide (MAG-OX) 400 (240 Mg) MG tablet Take 1 tablet by mouth daily  Qty: 30 tablet, Refills: 0      zinc sulfate (ZINCATE) 220 (50 Zn) MG capsule Take 1 capsule by mouth daily  Qty: 30 capsule, Refills: 0      tamsulosin (FLOMAX) 0.4 MG capsule Take 1 capsule by mouth daily  Qty: 30 capsule, Refills: 3      ascorbic acid (VITAMIN C) 500 MG tablet Take 1 tablet by mouth daily  Qty: 30 tablet, Refills: 0      Vitamin D (CHOLECALCIFEROL) 25 MCG (1000 UT) TABS tablet Take 1 tablet by mouth daily  Qty: 60 tablet, Refills: 0    Comments: Labeling may look different. 25 mcg=1000 Units. Please double check dosages.       cefdinir (OMNICEF) 300 MG capsule Take 1 capsule by mouth 2 times daily for 6 days  Qty: 12 capsule, Refills: 0              Details   levothyroxine (SYNTHROID) 50 MCG tablet take 1 tablet by mouth once daily  Qty: 30 tablet, Refills: 1              Details   rosuvastatin (CRESTOR) 20 MG tablet Take 20 mg by mouth daily      SITagliptin (JANUVIA) 50 MG tablet Take 50 mg by mouth daily      omeprazole (PRILOSEC) 20 MG delayed release capsule Take 20 mg by mouth daily      cilostazol (PLETAL) 100 MG tablet Take 1 tablet by mouth 2 times daily. Qty: 60 tablet, Refills: 6      amlodipine (NORVASC) 5 MG tablet Take 1 tablet by mouth daily. Qty: 30 tablet, Refills: 6      furosemide (LASIX) 20 MG tablet take 1 tablet by mouth every other day  Qty: 15 tablet, Refills: 9           Patient was seen and examined by Dr. Doherty Signs and plan of care reviewed. Treatment plan was formulated collaboratively. Signed:  Electronically signed by MORRIS Blue on 2/3/2022 at 11:17 AM       Thank you COURT Coles CNP for the opportunity to be involved in this patient's care. If you have any questions or concerns please feel free to contact me at [phone].

## 2022-02-03 NOTE — CARE COORDINATION
The Plan for Transition of Care is related to the following treatment goals: home with family assist     The Patient and/or patient representative was provided with a choice of provider and agrees with the discharge plan. [x] Yes [] No    Freedom of choice list was provided with basic dialogue that supports the patient's individualized plan of care/goals, treatment preferences and shares the quality data associated with the providers. [x] Yes [] No    No DME needs noted at this time. Does not qualify for home O2 at this time.

## 2022-02-03 NOTE — FLOWSHEET NOTE
02/02/22 0410   Assessment   Charting Type Shift assessment   Neurological   Neuro (WDL) X   Level of Consciousness Alert (0)   Swallow Screening   Is the patient able to remain alert for testing? Yes   Was the Patient Eating a Modified Diet Prior to being Admitted? No   Antonio Coma Scale   Eye Opening 4   Best Verbal Response 5   Best Motor Response 6   Antonio Coma Scale Score 15   NIHSS Stroke Scale   NIHSS Stroke Scale Assessed No   HEENT   HEENT (WDL) X   Right Eye Impaired vision   Left Eye Impaired vision   Right Ear Impaired hearing   Left Ear Impaired hearing   Teeth Missing teeth   Respiratory   Respiratory (WDL) X   Breath Sounds   Right Upper Lobe Diminished   Right Middle Lobe Diminished   Right Lower Lobe Diminished   Left Upper Lobe Rhonchi   Left Lower Lobe Diminished   Cardiac   Cardiac (WDL) WDL   Rhythm Interpretation   Pulse 66   Cardiac Monitor   Telemetry Monitor On Yes   Telemetry Audible Yes   Telemetry Alarms Set Yes   Telemetry Box Number [id number]   Gastrointestinal   Abdominal (WDL) X   Abdomen Inspection Rounded   Last BM (including prior to admit) 01/31/22   RUQ Bowel Sounds Active   LUQ Bowel Sounds Active   RLQ Bowel Sounds Active   LLQ Bowel Sounds Active   Peripheral Vascular   Peripheral Vascular (WDL) WDL   Edema None   Skin Color/Condition   Skin Color/Condition (WDL) X   Skin Color Pale   Skin Condition/Temp Warm;Dry   Skin Integrity   Skin Integrity (WDL) X   Skin Integrity Tear   Location lt arm   Skin Integrity Site 2   Skin Integrity Location 2 Other (Comment)  (dry patches)   Location 2 [employer] arms   Preventative Dressing No   Assessed this shift? Yes   Musculoskeletal   Musculoskeletal (WDL) X   RUE Weakness   LUE Weakness   RL Extremity Weakness; Unsteady   LL Extremity Weakness; Unsteady   Genitourinary   Genitourinary (WDL) X   Urine Assessment   Incontinence No   Urine Color Yellow/straw   Urine Appearance Clear   Psychosocial   Psychosocial (WDL) WDL

## 2022-02-03 NOTE — PROGRESS NOTES
Message to on call provider, pt requesting something for sleep, pt takes tylenol pm at home, okay for pt to  Have benadryl 25mg po qhs prn for sleep, pt has tylenol already ordered, order entered

## 2022-03-11 ENCOUNTER — HOSPITAL ENCOUNTER (EMERGENCY)
Facility: HOSPITAL | Age: 82
Discharge: HOME | End: 2022-03-11
Attending: EMERGENCY MEDICINE
Payer: MEDICARE

## 2022-03-11 ENCOUNTER — APPOINTMENT (OUTPATIENT)
Dept: GENERAL RADIOLOGY | Facility: HOSPITAL | Age: 82
End: 2022-03-11
Payer: MEDICARE

## 2022-03-11 VITALS
BODY MASS INDEX: 30.06 KG/M2 | WEIGHT: 210 LBS | HEART RATE: 102 BPM | DIASTOLIC BLOOD PRESSURE: 52 MMHG | HEIGHT: 70 IN | OXYGEN SATURATION: 99 % | SYSTOLIC BLOOD PRESSURE: 119 MMHG | RESPIRATION RATE: 33 BRPM | TEMPERATURE: 97.6 F

## 2022-03-11 DIAGNOSIS — R06.00 DYSPNEA, UNSPECIFIED TYPE: ICD-10-CM

## 2022-03-11 DIAGNOSIS — R53.1 GENERALIZED WEAKNESS: Primary | ICD-10-CM

## 2022-03-11 DIAGNOSIS — E87.6 HYPOKALEMIA: ICD-10-CM

## 2022-03-11 DIAGNOSIS — D72.829 LEUKOCYTOSIS, UNSPECIFIED TYPE: ICD-10-CM

## 2022-03-11 LAB
A/G RATIO: 0.8 (ref 0.8–2)
ALBUMIN SERPL-MCNC: 3.4 G/DL (ref 3.4–4.8)
ALP BLD-CCNC: 90 U/L (ref 25–100)
ALT SERPL-CCNC: 8 U/L (ref 4–36)
ANION GAP SERPL CALCULATED.3IONS-SCNC: 17 MMOL/L (ref 3–16)
APTT: 29.9 SEC (ref 22.5–34.9)
AST SERPL-CCNC: 12 U/L (ref 8–33)
BASE EXCESS ARTERIAL: -0.1 MMOL/L (ref -3–3)
BASOPHILS ABSOLUTE: 0.1 K/UL (ref 0–0.1)
BASOPHILS RELATIVE PERCENT: 0.3 %
BILIRUB SERPL-MCNC: 1 MG/DL (ref 0.3–1.2)
BUN BLDV-MCNC: 25 MG/DL (ref 6–20)
CALCIUM SERPL-MCNC: 10.3 MG/DL (ref 8.5–10.5)
CHLORIDE BLD-SCNC: 92 MMOL/L (ref 98–107)
CO2: 21 MMOL/L (ref 20–30)
CREAT SERPL-MCNC: 1.7 MG/DL (ref 0.4–1.2)
D DIMER: 3.6 UG/ML FEU (ref 0–0.6)
EOSINOPHILS ABSOLUTE: 0 K/UL (ref 0–0.4)
EOSINOPHILS RELATIVE PERCENT: 0 %
FIO2: 0.21 %
GFR AFRICAN AMERICAN: 47
GFR NON-AFRICAN AMERICAN: 39
GLOBULIN: 4.1 G/DL
GLUCOSE BLD-MCNC: 276 MG/DL (ref 74–106)
HCO3 ARTERIAL: 19.9 MMOL/L (ref 22–26)
HCT VFR BLD CALC: 44.3 % (ref 40–54)
HEMOGLOBIN: 15.1 G/DL (ref 13–18)
IMMATURE GRANULOCYTES #: 0.3 K/UL
IMMATURE GRANULOCYTES %: 1.5 % (ref 0–5)
INR BLD: 1.12 (ref 0.88–1.11)
LYMPHOCYTES ABSOLUTE: 1.5 K/UL (ref 1.5–4)
LYMPHOCYTES RELATIVE PERCENT: 8.1 %
MAGNESIUM: 1.9 MG/DL (ref 1.7–2.4)
MCH RBC QN AUTO: 29.8 PG (ref 27–32)
MCHC RBC AUTO-ENTMCNC: 34.1 G/DL (ref 31–35)
MCV RBC AUTO: 87.4 FL (ref 80–100)
MONOCYTES ABSOLUTE: 1 K/UL (ref 0.2–0.8)
MONOCYTES RELATIVE PERCENT: 5.6 %
NEUTROPHILS ABSOLUTE: 15.3 K/UL (ref 2–7.5)
NEUTROPHILS RELATIVE PERCENT: 84.5 %
O2 SAT, ARTERIAL: 96.4 %
O2 THERAPY: ABNORMAL
PCO2 ARTERIAL: 21.7 MMHG (ref 35–45)
PDW BLD-RTO: 14 % (ref 11–16)
PH ARTERIAL: 7.58 (ref 7.35–7.45)
PLATELET # BLD: 457 K/UL (ref 150–400)
PMV BLD AUTO: 9.7 FL (ref 6–10)
PO2 ARTERIAL: 72.4 MMHG (ref 80–100)
POTASSIUM REFLEX MAGNESIUM: 3.2 MMOL/L (ref 3.4–5.1)
PRO-BNP: 707 PG/ML (ref 0–1800)
PROTHROMBIN TIME: 13.9 SEC (ref 11.6–13.8)
RBC # BLD: 5.07 M/UL (ref 4.5–6)
SARS-COV-2, NAAT: NOT DETECTED
SODIUM BLD-SCNC: 130 MMOL/L (ref 136–145)
TCO2 ARTERIAL: 20.6 MMOL/L (ref 24–30)
TOTAL PROTEIN: 7.5 G/DL (ref 6.4–8.3)
TROPONIN: <0.3 NG/ML
WBC # BLD: 18.1 K/UL (ref 4–11)

## 2022-03-11 PROCEDURE — 2580000003 HC RX 258: Performed by: EMERGENCY MEDICINE

## 2022-03-11 PROCEDURE — 82803 BLOOD GASES ANY COMBINATION: CPT

## 2022-03-11 PROCEDURE — 87635 SARS-COV-2 COVID-19 AMP PRB: CPT

## 2022-03-11 PROCEDURE — 85610 PROTHROMBIN TIME: CPT

## 2022-03-11 PROCEDURE — 36600 WITHDRAWAL OF ARTERIAL BLOOD: CPT

## 2022-03-11 PROCEDURE — 36415 COLL VENOUS BLD VENIPUNCTURE: CPT

## 2022-03-11 PROCEDURE — 83735 ASSAY OF MAGNESIUM: CPT

## 2022-03-11 PROCEDURE — 71045 X-RAY EXAM CHEST 1 VIEW: CPT

## 2022-03-11 PROCEDURE — 83880 ASSAY OF NATRIURETIC PEPTIDE: CPT

## 2022-03-11 PROCEDURE — 99283 EMERGENCY DEPT VISIT LOW MDM: CPT

## 2022-03-11 PROCEDURE — 85025 COMPLETE CBC W/AUTO DIFF WBC: CPT

## 2022-03-11 PROCEDURE — 6370000000 HC RX 637 (ALT 250 FOR IP): Performed by: FAMILY MEDICINE

## 2022-03-11 PROCEDURE — 85379 FIBRIN DEGRADATION QUANT: CPT

## 2022-03-11 PROCEDURE — 93005 ELECTROCARDIOGRAM TRACING: CPT

## 2022-03-11 PROCEDURE — 80053 COMPREHEN METABOLIC PANEL: CPT

## 2022-03-11 PROCEDURE — 85730 THROMBOPLASTIN TIME PARTIAL: CPT

## 2022-03-11 PROCEDURE — 84484 ASSAY OF TROPONIN QUANT: CPT

## 2022-03-11 RX ORDER — POTASSIUM CHLORIDE 20 MEQ/1
40 TABLET, EXTENDED RELEASE ORAL ONCE
Status: COMPLETED | OUTPATIENT
Start: 2022-03-11 | End: 2022-03-11

## 2022-03-11 RX ORDER — SODIUM CHLORIDE, SODIUM LACTATE, POTASSIUM CHLORIDE, AND CALCIUM CHLORIDE .6; .31; .03; .02 G/100ML; G/100ML; G/100ML; G/100ML
1000 INJECTION, SOLUTION INTRAVENOUS ONCE
Status: COMPLETED | OUTPATIENT
Start: 2022-03-11 | End: 2022-03-11

## 2022-03-11 RX ORDER — POTASSIUM CHLORIDE 20 MEQ/1
20 TABLET, EXTENDED RELEASE ORAL DAILY
Qty: 7 TABLET | Refills: 0 | Status: ON HOLD | OUTPATIENT
Start: 2022-03-11 | End: 2022-04-04

## 2022-03-11 RX ADMIN — POTASSIUM CHLORIDE 40 MEQ: 1500 TABLET, EXTENDED RELEASE ORAL at 20:55

## 2022-03-11 RX ADMIN — SODIUM CHLORIDE, POTASSIUM CHLORIDE, SODIUM LACTATE AND CALCIUM CHLORIDE 1000 ML: 600; 310; 30; 20 INJECTION, SOLUTION INTRAVENOUS at 17:53

## 2022-03-11 ASSESSMENT — PAIN DESCRIPTION - LOCATION: LOCATION: NECK;EAR

## 2022-03-11 ASSESSMENT — PAIN DESCRIPTION - DESCRIPTORS: DESCRIPTORS: ACHING

## 2022-03-11 ASSESSMENT — PAIN SCALES - GENERAL: PAINLEVEL_OUTOF10: 8

## 2022-03-11 ASSESSMENT — PAIN DESCRIPTION - PAIN TYPE: TYPE: ACUTE PAIN

## 2022-03-11 NOTE — ED PROVIDER NOTES
[facility] ENCOUNTER      Pt Name: Mily Spencer  MRN: [id number]  YOB: 1940  Date of evaluation: 3/11/2022  Provider: Stephen Bartlett MD    [address] [place] [address]       Chief Complaint   Patient presents with    Fatigue     legs are weak    Shortness of Breath    Headache    Otalgia    Dizziness         HISTORY OF PRESENT ILLNESS  (Location/Symptom, Timing/Onset, Context/Setting, Quality, Duration, Modifying Factors, Severity.)   Mily Spencer is a [de-identified] y.o. male who presents to the emergency department planing of being sick he states that he just cannot get over Covid which she was apparently diagnosed with on January 31. He states that his legs are weak although he was able to walk from the parking lot into the hospital with help of a cane he states his ears feel full of water complains of headache denies any fever but he has had head congestion no sore throat he has been short of breath and had a cough productive of yellow sputum denies any chest pain denies nausea vomiting or diarrhea. Nursing notes were reviewed. REVIEW OFSYSTEMS    (2-9 systems for level 4, 10 or more for level 5)   ROS:  General:  No fevers, no chills, no weakness  Cardiovascular:  No chest pain, no palpitations  Respiratory:  + shortness of breath, + cough, no wheezing  Gastrointestinal:  No pain, no nausea, no vomiting, no diarrhea  Musculoskeletal:  No muscle pain, no joint pain  Skin:  No rash, no easy bruising  Neurologic:  No speech problems, no headache, no extremity weakness  Psychiatric:  No anxiety  Genitourinary:  No dysuria, no hematuria    Except as noted above the remainder of the review of systems was reviewed and negative.        PAST MEDICAL HISTORY     Past Medical History:   Diagnosis Date    Cataract     [facility] mellitus) [facility] [address]     Erectile dysfunction     Hard of hearing     hearing aid    High cholesterol     Hypertension     Hypothyroidism     Peripheral vascular disease ([facility] [address]     Pneumonia          SURGICAL HISTORY       Past Surgical History:   Procedure Laterality Date    CYST REMOVAL      under right arm     EYE SURGERY  01/2012    cataract    FOOT SURGERY      growth removed; left         CURRENT MEDICATIONS       Previous Medications    ALBUTEROL SULFATE  (90 BASE) MCG/ACT INHALER    Inhale 2 puffs into the lungs every 6 hours as needed for Wheezing    AMLODIPINE (NORVASC) 5 MG TABLET    Take 1 tablet by mouth daily. ASCORBIC ACID (VITAMIN C) 500 MG TABLET    Take 1 tablet by mouth daily    CILOSTAZOL (PLETAL) 100 MG TABLET    Take 1 tablet by mouth 2 times daily. FUROSEMIDE (LASIX) 20 MG TABLET    take 1 tablet by mouth every other day    LEVOTHYROXINE (SYNTHROID) 50 MCG TABLET    take 1 tablet by mouth once daily    MAGNESIUM OXIDE (MAG-OX) 400 (240 MG) MG TABLET    Take 1 tablet by mouth daily    OMEPRAZOLE (PRILOSEC) 20 MG DELAYED RELEASE CAPSULE    Take 20 mg by mouth daily    ROSUVASTATIN (CRESTOR) 20 MG TABLET    Take 20 mg by mouth daily    SITAGLIPTIN (JANUVIA) 50 MG TABLET    Take 50 mg by mouth daily    TAMSULOSIN (FLOMAX) 0.4 MG CAPSULE    Take 1 capsule by mouth daily    VITAMIN D (CHOLECALCIFEROL) 25 MCG (1000 UT) TABS TABLET    Take 1 tablet by mouth daily    ZINC SULFATE (ZINCATE) 220 (50 ZN) MG CAPSULE    Take 1 capsule by mouth daily       ALLERGIES     Patient has no known allergies.     FAMILY HISTORY       Family History   Problem Relation Age of Onset    Stroke Mother     Heart Disease Daughter         MI    Stroke Daughter           SOCIAL HISTORY       Social History     Socioeconomic History    Marital status:      Spouse name: None    Number of children: None    Years of education: None    Highest education level: None   Occupational History    None   Tobacco Use    Smoking status: Current Every Day Smoker     Packs/day: 0.50     Years: 53.00     Pack years: 26.50 Types: Cigarettes    Smokeless tobacco: Never Used   Substance and Sexual Activity    Alcohol use: No    Drug use: No    Sexual activity: None   Other Topics Concern    None   Social History Narrative    None     Social Determinants of Health     Financial Resource Strain:     Difficulty of Paying Living Expenses: Not on file   Food Insecurity:     Worried About Running Out of Food in the Last Year: Not on file    Niraj of Food in the Last Year: Not on file   Transportation Needs:     Lack of Transportation (Medical): Not on file    Lack of Transportation (Non-Medical):  Not on file   Physical Activity:     Days of Exercise per Week: Not on file    Minutes of Exercise per Session: Not on file   Stress:     Feeling of Stress : Not on file   Social Connections:     Frequency of Communication with Friends and Family: Not on file    Frequency of Social Gatherings with Friends and Family: Not on file    Attends Yazidism Services: Not on file    Active Member of [address] [place] [address] or Organizations: Not on file    Attends Club or Organization Meetings: Not on file    Marital Status: Not on file   Intimate Partner Violence:     Fear of Current or Ex-Partner: Not on file    Emotionally Abused: Not on file    Physically Abused: Not on file    Sexually Abused: Not on file   Housing Stability:     Unable to Pay for Housing in the Last Year: Not on file    Number of [place] in the Last Year: Not on file    Unstable Housing in the Last Year: Not on file         PHYSICAL EXAM    (up to 7 for level 4, 8 or more for level 5)     ED Triage Vitals   BP Temp Temp Source Pulse Resp SpO2 Height Weight   03/11/22 1633 03/11/22 1631 03/11/22 1631 03/11/22 1628 03/11/22 1628 03/11/22 1628 03/11/22 1628 03/11/22 1628   105/68 97.6 °F (36.4 °C) Oral 115 24 100 % 5' 10\" (1.778 m) 210 lb (95.3 kg)       Physical Exam  General :Patient is awake, alert, oriented, in no acute distress, nontoxic appearing  HEENT: Pupils are equally round and reactive to light, EOMI, conjunctivae clear. Neck: Neck is supple, full range of motion, trachea midline  Cardiac: Heart regular rate, rhythm, no murmurs, rubs, or gallops  Lungs: Lungs are clear to auscultation, there is no wheezing, rhonchi, or rales. There is no use of accessory muscles. Chest wall: There is no tenderness to palpation over the chest wall or over ribs  Abdomen: Abdomen is soft, nontender, nondistended. There is no firm or pulsatile masses, no rebound rigidity or guarding. Musculoskeletal: 5 out of 5 strength in all 4 extremities. No focal muscle deficits are appreciated  Neuro: Motor intact, sensory intact, level of consciousness is normal, Dermatology: Skin is warm and dry  Psych: Mentation is grossly normal, cognition is grossly normal. Affect is appropriate. DIAGNOSTIC RESULTS     EKG: All EKG's are interpreted by the Emergency Department Physician who either signs or Co-signs this chart in the [address] a cardiologist.    The EKG interpreted by me shows sinus tachycardia rate of 125 frequent PVCs minimal ST depression V3 through 6    RADIOLOGY:   Non-plain film images such as CT, Ultrasound and MRI are read by the radiologist. Plain radiographic images are visualized and preliminarily interpreted by the emergency physician with the below findings:      ? Radiologist's Report Reviewed:  XR CHEST PORTABLE   Final Result      Improved airspace disease in the left midlung. No definite acute cardiopulmonary disease.             ED BEDSIDE ULTRASOUND:   Performed by ED Physician - none    LABS:    I have reviewed and interpreted all of the currently available lab results from this visit (ifapplicable):  Results for orders placed or performed during the hospital encounter of 03/11/22   COVID-19, Rapid    Specimen: Nasopharyngeal Swab   Result Value Ref Range    SARS-CoV-2, NAAT Not Detected Not Detected   Blood Gas, Arterial   Result Value Ref Range    pH, Arterial 7.580 (HH) 7.350 - 7.450    pCO2, Arterial 21.7 (L) 35.0 - 45.0 mmHg    pO2, Arterial 72.4 (L) 80.0 - 100.0 mmHg    HCO3, Arterial 19.9 (L) 22.0 - 26.0 mmol/L    Base Excess, Arterial -0.1 -3.0 - 3.0 mmol/L    O2 Sat, Arterial 96.4 >92 %    TCO2, Arterial 20.6 (L) 24.0 - 30.0 mmol/L    O2 Therapy Unknown     FIO2 0.21 Not Established %   CBC with Auto Differential   Result Value Ref Range    WBC 18.1 (H) 4.0 - 11.0 K/uL    RBC 5.07 4.50 - 6.00 M/uL    Hemoglobin 15.1 13.0 - 18.0 g/dL    Hematocrit 44.3 40.0 - 54.0 %    MCV 87.4 80.0 - 100.0 fL    MCH 29.8 27.0 - 32.0 pg    MCHC 34.1 31.0 - 35.0 g/dL    RDW 14.0 11.0 - 16.0 %    Platelets 733 (H) 952 - 400 K/uL    MPV 9.7 6.0 - 10.0 fL    Neutrophils % 84.5 %    Immature Granulocytes % 1.5 0.0 - 5.0 %    Lymphocytes % 8.1 %    Monocytes % 5.6 %    Eosinophils % 0.0 %    Basophils % 0.3 %    Neutrophils Absolute 15.3 (H) 2.0 - 7.5 K/uL    Immature Granulocytes # 0.3 K/uL    Lymphocytes Absolute 1.5 1.5 - 4.0 K/uL    Monocytes Absolute 1.0 (H) 0.2 - 0.8 K/uL    Eosinophils Absolute 0.0 0.0 - 0.4 K/uL    Basophils Absolute 0.1 0.0 - 0.1 K/uL   Comprehensive Metabolic Panel w/ Reflex to MG   Result Value Ref Range    Sodium 130 (L) 136 - 145 mmol/L    Potassium reflex Magnesium 3.2 (L) 3.4 - 5.1 mmol/L    Chloride 92 (L) 98 - 107 mmol/L    CO2 21 20 - 30 mmol/L    Anion Gap 17 (H) 3 - 16    Glucose 276 (H) 74 - 106 mg/dL    BUN 25 (H) 6 - 20 mg/dL    CREATININE 1.7 (H) 0.4 - 1.2 mg/dL    GFR Non-African American 39 (L) >59    GFR  47 (L) >59    Calcium 10.3 8.5 - 10.5 mg/dL    Total Protein 7.5 6.4 - 8.3 g/dL    Albumin 3.4 3.4 - 4.8 g/dL    Albumin/Globulin Ratio 0.8 0.8 - 2.0    Total Bilirubin 1.0 0.3 - 1.2 mg/dL    Alkaline Phosphatase 90 25 - 100 U/L    ALT 8 4 - 36 U/L    AST 12 8 - 33 U/L    Globulin 4.1 Not Established g/dL   Troponin   Result Value Ref Range    Troponin <0.30 <0.30 ng/mL   Brain Natriuretic Peptide   Result Value Ref Range    Pro-BNP 707 0 - 1,800 pg/mL        All other labs were within normal range or not returned as of this dictation. EMERGENCY DEPARTMENT COURSE and DIFFERENTIAL DIAGNOSIS/MDM:   Vitals:    Vitals:    22 1628 22 1631 22 1633 22 1749   BP:   105/68 108/61   Pulse: 115   110   Resp: 24   24   Temp:  97.6 °F (36.4 °C)     TempSrc:  Oral     SpO2: 100%      Weight: 210 lb (95.3 kg)      Height: 5' 10\" (1.778 m)          MEDICATIONS ADMINISTERED IN ED:  Medications   lactated ringers bolus (1,000 mLs IntraVENous New Bag 3/11/22 1753)       Patient blood gas is consistent with respiratory alkalosis and hyperventilation his potassium is somewhat low white count is 18.1 but his chest x-ray looks improved from his previous Covid pneumonia he is Covid negative at this time D-dimer however is elevated 3.61 and his creatinine is also elevated 1.7 we are unable to do V/Q scans at this facility. And currently awaiting his magnesium and PT PTT and will decide what to do after they come back. Discussed with and signed over to Dr. Miguel Angel Palacios at 1900 hrs. The patient will follow-up with their PCP in 1-2 days for reevaluation. If the patient or family members have anyfurther concerns or any worsening symptoms they will return to the ED for reevaluation. CONSULTS:  None    PROCEDURES:  Procedures    CRITICAL CARE TIME    Total Critical Care time was 0 minutes, excluding separately reportable procedures. There was a high probability of clinically significant/life threatening deterioration in the patient's condition which required my urgent intervention. FINAL IMPRESSION    No diagnosis found. DISPOSITION/PLAN   DISPOSITION        PATIENT REFERRED TO:  No follow-up provider specified.     DISCHARGE MEDICATIONS:  New Prescriptions    No medications on file       Comment: Please note this report has been produced using speech recognition software and may contain errorsrelated to that system including errors in grammar, punctuation, and spelling, as well as words and phrases that may be inappropriate. If there are any questions or concerns please feel free to contact the dictating providerfor clarification.     Yari Sun MD  Attending Emergency Physician              Yari Sun MD  03/11/22 5319

## 2022-03-11 NOTE — ED TRIAGE NOTES
Pt states his legs are weak, he  Has a headache, his ears are plugged and he is dizzy. He went to [facility] today and she sent him here to be checked out.

## 2022-03-12 NOTE — ED PROVIDER NOTES
8:21 PM EST  Sy Rodrigez was checked out to me by Dr. Penny Duran. Please see his/her initial documentation for details of the patient's ED presentation, physical exam and completed studies. In brief, Sy Rodrigez is a [de-identified] y.o. male that presents to ED for having 1 1/2 month history of having weakness to his legs, intermittent headaches, feels like his ears are plugged. He has been having some dizziness. [facility] for [facility] evaluation. Pt with history of COVID back at end of January. Pt was admitted on Jan 31st and was here till 2/3/22. Pt has been having occasional falls at home. Family says that he isn't getting up much during the day and not eating that much. He has pain to his legs more than normal. Some shortness of breath but no more than his usual. He is coughing up clear sputum. Pt had labs done, CXR done. Blood gas done. Went over labs with the patient. Pt wanting to go home. Pt noted to have some low K+3.2, kidney function 1.7 with last Cr 1.3. Pt diabetic with sugar 276. Negative BNP and troponin. WBC 18.1, negative cxr for any acute consolidation. Pt on aspirin daily as I discussed about him having elevated D Dimer and with Cr showing GFR 39, unable to perform CTA PE study. Pt with normal Oxygen Sat 97% on room air. Discussed unable to get VQ scan here right now. Would have to ship patient to another hospital and no current beds locally. Pt feeling well. Wanting to go home. Discussed about risk of having normal respirations, no acute cxr findings for pneumonia but most likely having COVID pneumonia post complications.      I have reviewed and interpreted all of the currently available lab results from this visit (if applicable):  Results for orders placed or performed during the hospital encounter of 03/11/22   COVID-19, Rapid    Specimen: Nasopharyngeal Swab   Result Value Ref Range    SARS-CoV-2, NAAT Not Detected Not Detected   Blood Gas, Arterial Result Value Ref Range    pH, Arterial 7.580 (HH) 7.350 - 7.450    pCO2, Arterial 21.7 (L) 35.0 - 45.0 mmHg    pO2, Arterial 72.4 (L) 80.0 - 100.0 mmHg    HCO3, Arterial 19.9 (L) 22.0 - 26.0 mmol/L    Base Excess, Arterial -0.1 -3.0 - 3.0 mmol/L    O2 Sat, Arterial 96.4 >92 %    TCO2, Arterial 20.6 (L) 24.0 - 30.0 mmol/L    O2 Therapy Unknown     FIO2 0.21 Not Established %   CBC with Auto Differential   Result Value Ref Range    WBC 18.1 (H) 4.0 - 11.0 K/uL    RBC 5.07 4.50 - 6.00 M/uL    Hemoglobin 15.1 13.0 - 18.0 g/dL    Hematocrit 44.3 40.0 - 54.0 %    MCV 87.4 80.0 - 100.0 fL    MCH 29.8 27.0 - 32.0 pg    MCHC 34.1 31.0 - 35.0 g/dL    RDW 14.0 11.0 - 16.0 %    Platelets 417 (H) 854 - 400 K/uL    MPV 9.7 6.0 - 10.0 fL    Neutrophils % 84.5 %    Immature Granulocytes % 1.5 0.0 - 5.0 %    Lymphocytes % 8.1 %    Monocytes % 5.6 %    Eosinophils % 0.0 %    Basophils % 0.3 %    Neutrophils Absolute 15.3 (H) 2.0 - 7.5 K/uL    Immature Granulocytes # 0.3 K/uL    Lymphocytes Absolute 1.5 1.5 - 4.0 K/uL    Monocytes Absolute 1.0 (H) 0.2 - 0.8 K/uL    Eosinophils Absolute 0.0 0.0 - 0.4 K/uL    Basophils Absolute 0.1 0.0 - 0.1 K/uL   Comprehensive Metabolic Panel w/ Reflex to MG   Result Value Ref Range    Sodium 130 (L) 136 - 145 mmol/L    Potassium reflex Magnesium 3.2 (L) 3.4 - 5.1 mmol/L    Chloride 92 (L) 98 - 107 mmol/L    CO2 21 20 - 30 mmol/L    Anion Gap 17 (H) 3 - 16    Glucose 276 (H) 74 - 106 mg/dL    BUN 25 (H) 6 - 20 mg/dL    CREATININE 1.7 (H) 0.4 - 1.2 mg/dL    GFR Non-African American 39 (L) >59    GFR  47 (L) >59    Calcium 10.3 8.5 - 10.5 mg/dL    Total Protein 7.5 6.4 - 8.3 g/dL    Albumin 3.4 3.4 - 4.8 g/dL    Albumin/Globulin Ratio 0.8 0.8 - 2.0    Total Bilirubin 1.0 0.3 - 1.2 mg/dL    Alkaline Phosphatase 90 25 - 100 U/L    ALT 8 4 - 36 U/L    AST 12 8 - 33 U/L    Globulin 4.1 Not Established g/dL   Troponin   Result Value Ref Range    Troponin <0.30 <0.30 ng/mL   Brain Natriuretic Peptide   Result Value Ref Range    Pro- 0 - 1,800 pg/mL   D-Dimer, Quantitative   Result Value Ref Range    D-Dimer, Quant 3.60 (HH) 0.00 - 0.60 ug/mL FEU   APTT   Result Value Ref Range    aPTT 29.9 22.5 - 34.9 sec   Protime-INR   Result Value Ref Range    Protime 13.9 (H) 11.6 - 13.8 sec    INR 1.12 (H) 0.88 - 1.11   Magnesium   Result Value Ref Range    Magnesium 1.9 1.7 - 2.4 mg/dL       MDM:  Went back and forth with patient and family about his symptoms. I came in at 7 pm and catching the end of work up. Discussed possible need to get VQ scan on patient but he doesn't want to \"sit in the ER bed cause of anxiety and other reasons\". Discussed that these symptoms have been going on for 1 1/2 months and nothing acute has been emergently happening. Pt wanting to go home. Review of labs, CXR, and assessment of patient/vitals, and oxygenation not giving any acute worry that something impending or significant going on. Family said that he isn't getting up walking and he needs to just do some things at home to help himself. Discussed to take him home, give 81 mg aspirin daily, watch his symptoms and if they worsen to return. 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 Final Impression:  1. Generalized weakness New Problem   2. Hypokalemia New Problem   3. Leukocytosis, unspecified type New Problem   4. Dyspnea, unspecified type New Problem     Pt to follow up with [address] [place] [address] in the next 1 week. Pt to hold the Lasix for now that is making his potassium low. Gave some potassium pills.  Pt to start getting up a little more moving around and trying to eat better and return to ER if he worsens      (Please note that portions of this note may have been completed with a voice recognition program. Efforts were made to edit the dictations but occasionally words are mis-transcribed.)    [address] DO Rabia Torres DO  03/12/22 9059

## 2022-03-24 ENCOUNTER — HOSPITAL ENCOUNTER (OUTPATIENT)
Facility: HOSPITAL | Age: 82
Discharge: HOME | End: 2022-03-24
Payer: MEDICARE

## 2022-03-24 LAB
A/G RATIO: 1.1 (ref 0.8–2)
ALBUMIN SERPL-MCNC: 3.4 G/DL (ref 3.4–4.8)
ALP BLD-CCNC: 97 U/L (ref 25–100)
ALT SERPL-CCNC: 7 U/L (ref 4–36)
ANION GAP SERPL CALCULATED.3IONS-SCNC: 17 MMOL/L (ref 3–16)
AST SERPL-CCNC: 11 U/L (ref 8–33)
BILIRUB SERPL-MCNC: 0.4 MG/DL (ref 0.3–1.2)
BUN BLDV-MCNC: 18 MG/DL (ref 6–20)
CALCIUM SERPL-MCNC: 10.5 MG/DL (ref 8.5–10.5)
CHLORIDE BLD-SCNC: 99 MMOL/L (ref 98–107)
CO2: 21 MMOL/L (ref 20–30)
CREAT SERPL-MCNC: 1.4 MG/DL (ref 0.4–1.2)
GFR AFRICAN AMERICAN: 59
GFR NON-AFRICAN AMERICAN: 49
GLOBULIN: 3.2 G/DL
GLUCOSE BLD-MCNC: 203 MG/DL (ref 74–106)
POTASSIUM SERPL-SCNC: 4.4 MMOL/L (ref 3.4–5.1)
SODIUM BLD-SCNC: 137 MMOL/L (ref 136–145)
TOTAL PROTEIN: 6.6 G/DL (ref 6.4–8.3)

## 2022-03-24 PROCEDURE — 36415 COLL VENOUS BLD VENIPUNCTURE: CPT

## 2022-03-24 PROCEDURE — 80053 COMPREHEN METABOLIC PANEL: CPT

## 2022-04-04 ENCOUNTER — HOSPITAL ENCOUNTER (OUTPATIENT)
Facility: HOSPITAL | Age: 82
Setting detail: OBSERVATION
Discharge: HOME HEALTH | End: 2022-04-06
Attending: INTERNAL MEDICINE | Admitting: INTERNAL MEDICINE
Payer: MEDICARE

## 2022-04-04 DIAGNOSIS — Z86.73 HISTORY OF STROKE: Primary | ICD-10-CM

## 2022-04-04 LAB
A/G RATIO: 1.4 (ref 0.8–2)
ALBUMIN SERPL-MCNC: 3.4 G/DL (ref 3.4–4.8)
ALP BLD-CCNC: 89 U/L (ref 25–100)
ALT SERPL-CCNC: 7 U/L (ref 4–36)
ANION GAP SERPL CALCULATED.3IONS-SCNC: 14 MMOL/L (ref 3–16)
AST SERPL-CCNC: 12 U/L (ref 8–33)
BASOPHILS ABSOLUTE: 0.1 K/UL (ref 0–0.1)
BASOPHILS RELATIVE PERCENT: 0.5 %
BILIRUB SERPL-MCNC: 0.6 MG/DL (ref 0.3–1.2)
BUN BLDV-MCNC: 22 MG/DL (ref 6–20)
CALCIUM SERPL-MCNC: 9.9 MG/DL (ref 8.5–10.5)
CHLORIDE BLD-SCNC: 97 MMOL/L (ref 98–107)
CO2: 21 MMOL/L (ref 20–30)
CREAT SERPL-MCNC: 1.2 MG/DL (ref 0.4–1.2)
EOSINOPHILS ABSOLUTE: 0 K/UL (ref 0–0.4)
EOSINOPHILS RELATIVE PERCENT: 0.1 %
FOLATE: 13.26 NG/ML
GFR AFRICAN AMERICAN: >59
GFR NON-AFRICAN AMERICAN: 58
GLOBULIN: 2.4 G/DL
GLUCOSE BLD-MCNC: 136 MG/DL (ref 74–106)
GLUCOSE BLD-MCNC: 227 MG/DL (ref 74–106)
GLUCOSE BLD-MCNC: 230 MG/DL (ref 74–106)
HBA1C MFR BLD: 7.9 %
HCT VFR BLD CALC: 38.6 % (ref 40–54)
HEMOGLOBIN: 13.2 G/DL (ref 13–18)
IMMATURE GRANULOCYTES #: 0.1 K/UL
IMMATURE GRANULOCYTES %: 0.7 % (ref 0–5)
LYMPHOCYTES ABSOLUTE: 2 K/UL (ref 1.5–4)
LYMPHOCYTES RELATIVE PERCENT: 19.2 %
MAGNESIUM: 2.3 MG/DL (ref 1.7–2.4)
MCH RBC QN AUTO: 29.8 PG (ref 27–32)
MCHC RBC AUTO-ENTMCNC: 34.2 G/DL (ref 31–35)
MCV RBC AUTO: 87.1 FL (ref 80–100)
MONOCYTES ABSOLUTE: 0.6 K/UL (ref 0.2–0.8)
MONOCYTES RELATIVE PERCENT: 6.1 %
NEUTROPHILS ABSOLUTE: 7.7 K/UL (ref 2–7.5)
NEUTROPHILS RELATIVE PERCENT: 73.4 %
PDW BLD-RTO: 14.7 % (ref 11–16)
PERFORMED ON: ABNORMAL
PERFORMED ON: ABNORMAL
PLATELET # BLD: 320 K/UL (ref 150–400)
PMV BLD AUTO: 9.2 FL (ref 6–10)
POTASSIUM SERPL-SCNC: 3.1 MMOL/L (ref 3.4–5.1)
RBC # BLD: 4.43 M/UL (ref 4.5–6)
SARS-COV-2, NAAT: NOT DETECTED
SODIUM BLD-SCNC: 132 MMOL/L (ref 136–145)
TOTAL CK: 77 U/L (ref 26–174)
TOTAL PROTEIN: 5.8 G/DL (ref 6.4–8.3)
TSH SERPL DL<=0.05 MIU/L-ACNC: 0.58 UIU/ML (ref 0.27–4.2)
VITAMIN B-12: 536 PG/ML (ref 211–911)
VITAMIN D 25-HYDROXY: 10.4 (ref 32–100)
WBC # BLD: 10.5 K/UL (ref 4–11)

## 2022-04-04 PROCEDURE — 83735 ASSAY OF MAGNESIUM: CPT

## 2022-04-04 PROCEDURE — 83036 HEMOGLOBIN GLYCOSYLATED A1C: CPT

## 2022-04-04 PROCEDURE — 87635 SARS-COV-2 COVID-19 AMP PRB: CPT

## 2022-04-04 PROCEDURE — 82550 ASSAY OF CK (CPK): CPT

## 2022-04-04 PROCEDURE — 96372 THER/PROPH/DIAG INJ SC/IM: CPT

## 2022-04-04 PROCEDURE — 82306 VITAMIN D 25 HYDROXY: CPT

## 2022-04-04 PROCEDURE — 6360000002 HC RX W HCPCS: Performed by: PHYSICIAN ASSISTANT

## 2022-04-04 PROCEDURE — G0379 DIRECT REFER HOSPITAL OBSERV: HCPCS

## 2022-04-04 PROCEDURE — G0378 HOSPITAL OBSERVATION PER HR: HCPCS

## 2022-04-04 PROCEDURE — 80053 COMPREHEN METABOLIC PANEL: CPT

## 2022-04-04 PROCEDURE — 36415 COLL VENOUS BLD VENIPUNCTURE: CPT

## 2022-04-04 PROCEDURE — 6370000000 HC RX 637 (ALT 250 FOR IP): Performed by: PHYSICIAN ASSISTANT

## 2022-04-04 PROCEDURE — 85025 COMPLETE CBC W/AUTO DIFF WBC: CPT

## 2022-04-04 PROCEDURE — 82607 VITAMIN B-12: CPT

## 2022-04-04 PROCEDURE — 84443 ASSAY THYROID STIM HORMONE: CPT

## 2022-04-04 PROCEDURE — 82746 ASSAY OF FOLIC ACID SERUM: CPT

## 2022-04-04 RX ORDER — POLYETHYLENE GLYCOL 3350 17 G/17G
17 POWDER, FOR SOLUTION ORAL DAILY PRN
Status: DISCONTINUED | OUTPATIENT
Start: 2022-04-04 | End: 2022-04-06 | Stop reason: HOSPADM

## 2022-04-04 RX ORDER — DEXTROSE MONOHYDRATE 25 G/50ML
12.5 INJECTION, SOLUTION INTRAVENOUS PRN
Status: DISCONTINUED | OUTPATIENT
Start: 2022-04-04 | End: 2022-04-06 | Stop reason: HOSPADM

## 2022-04-04 RX ORDER — FUROSEMIDE 20 MG/1
20 TABLET ORAL DAILY
Status: DISCONTINUED | OUTPATIENT
Start: 2022-04-04 | End: 2022-04-06 | Stop reason: HOSPADM

## 2022-04-04 RX ORDER — TAMSULOSIN HYDROCHLORIDE 0.4 MG/1
0.4 CAPSULE ORAL DAILY
Status: DISCONTINUED | OUTPATIENT
Start: 2022-04-04 | End: 2022-04-06 | Stop reason: HOSPADM

## 2022-04-04 RX ORDER — ROSUVASTATIN CALCIUM 20 MG/1
20 TABLET, COATED ORAL DAILY
Status: DISCONTINUED | OUTPATIENT
Start: 2022-04-04 | End: 2022-04-06 | Stop reason: HOSPADM

## 2022-04-04 RX ORDER — ACETAMINOPHEN 650 MG/1
650 SUPPOSITORY RECTAL EVERY 6 HOURS PRN
Status: DISCONTINUED | OUTPATIENT
Start: 2022-04-04 | End: 2022-04-06 | Stop reason: HOSPADM

## 2022-04-04 RX ORDER — ALOGLIPTIN 12.5 MG/1
12.5 TABLET, FILM COATED ORAL DAILY
Status: DISCONTINUED | OUTPATIENT
Start: 2022-04-04 | End: 2022-04-06 | Stop reason: HOSPADM

## 2022-04-04 RX ORDER — ONDANSETRON 4 MG/1
4 TABLET, ORALLY DISINTEGRATING ORAL EVERY 8 HOURS PRN
Status: DISCONTINUED | OUTPATIENT
Start: 2022-04-04 | End: 2022-04-06 | Stop reason: HOSPADM

## 2022-04-04 RX ORDER — NICOTINE 21 MG/24HR
1 PATCH, TRANSDERMAL 24 HOURS TRANSDERMAL DAILY
Status: DISCONTINUED | OUTPATIENT
Start: 2022-04-04 | End: 2022-04-06 | Stop reason: HOSPADM

## 2022-04-04 RX ORDER — DEXTROSE MONOHYDRATE 25 G/50ML
12.5 INJECTION, SOLUTION INTRAVENOUS PRN
Status: DISCONTINUED | OUTPATIENT
Start: 2022-04-04 | End: 2022-04-04 | Stop reason: CLARIF

## 2022-04-04 RX ORDER — ERGOCALCIFEROL 1.25 MG/1
50000 CAPSULE ORAL WEEKLY
Status: DISCONTINUED | OUTPATIENT
Start: 2022-04-07 | End: 2022-04-06 | Stop reason: HOSPADM

## 2022-04-04 RX ORDER — LOSARTAN POTASSIUM 50 MG/1
100 TABLET ORAL DAILY
Status: DISCONTINUED | OUTPATIENT
Start: 2022-04-04 | End: 2022-04-06 | Stop reason: HOSPADM

## 2022-04-04 RX ORDER — NICOTINE POLACRILEX 4 MG
15 LOZENGE BUCCAL PRN
Status: DISCONTINUED | OUTPATIENT
Start: 2022-04-04 | End: 2022-04-04 | Stop reason: CLARIF

## 2022-04-04 RX ORDER — AMLODIPINE BESYLATE 5 MG/1
5 TABLET ORAL DAILY
Status: DISCONTINUED | OUTPATIENT
Start: 2022-04-04 | End: 2022-04-06

## 2022-04-04 RX ORDER — CILOSTAZOL 100 MG/1
100 TABLET ORAL 2 TIMES DAILY
Status: DISCONTINUED | OUTPATIENT
Start: 2022-04-04 | End: 2022-04-06 | Stop reason: HOSPADM

## 2022-04-04 RX ORDER — LEVOTHYROXINE SODIUM 0.05 MG/1
50 TABLET ORAL DAILY
Status: DISCONTINUED | OUTPATIENT
Start: 2022-04-04 | End: 2022-04-06 | Stop reason: HOSPADM

## 2022-04-04 RX ORDER — NICOTINE POLACRILEX 4 MG
15 LOZENGE BUCCAL PRN
Status: DISCONTINUED | OUTPATIENT
Start: 2022-04-04 | End: 2022-04-06 | Stop reason: HOSPADM

## 2022-04-04 RX ORDER — DEXTROSE MONOHYDRATE 50 MG/ML
100 INJECTION, SOLUTION INTRAVENOUS PRN
Status: DISCONTINUED | OUTPATIENT
Start: 2022-04-04 | End: 2022-04-06 | Stop reason: HOSPADM

## 2022-04-04 RX ORDER — LOSARTAN POTASSIUM 100 MG/1
100 TABLET ORAL DAILY
Status: ON HOLD | COMMUNITY
End: 2022-06-04 | Stop reason: HOSPADM

## 2022-04-04 RX ORDER — HYDROCHLOROTHIAZIDE 25 MG/1
25 TABLET ORAL DAILY
Status: ON HOLD | COMMUNITY
End: 2022-04-06 | Stop reason: SDUPTHER

## 2022-04-04 RX ORDER — ACETAMINOPHEN 325 MG/1
650 TABLET ORAL EVERY 6 HOURS PRN
Status: DISCONTINUED | OUTPATIENT
Start: 2022-04-04 | End: 2022-04-06 | Stop reason: HOSPADM

## 2022-04-04 RX ORDER — HYDROCHLOROTHIAZIDE 25 MG/1
25 TABLET ORAL DAILY
Status: DISCONTINUED | OUTPATIENT
Start: 2022-04-04 | End: 2022-04-06 | Stop reason: HOSPADM

## 2022-04-04 RX ORDER — PANTOPRAZOLE SODIUM 40 MG/1
40 TABLET, DELAYED RELEASE ORAL
Status: DISCONTINUED | OUTPATIENT
Start: 2022-04-05 | End: 2022-04-06 | Stop reason: HOSPADM

## 2022-04-04 RX ORDER — ERGOCALCIFEROL 1.25 MG/1
50000 CAPSULE ORAL DAILY
Status: COMPLETED | OUTPATIENT
Start: 2022-04-04 | End: 2022-04-06

## 2022-04-04 RX ORDER — ONDANSETRON 2 MG/ML
4 INJECTION INTRAMUSCULAR; INTRAVENOUS EVERY 6 HOURS PRN
Status: DISCONTINUED | OUTPATIENT
Start: 2022-04-04 | End: 2022-04-06 | Stop reason: HOSPADM

## 2022-04-04 RX ORDER — POTASSIUM CHLORIDE 750 MG/1
40 CAPSULE, EXTENDED RELEASE ORAL ONCE
Status: COMPLETED | OUTPATIENT
Start: 2022-04-04 | End: 2022-04-04

## 2022-04-04 RX ORDER — SODIUM CHLORIDE AND POTASSIUM CHLORIDE .9; .15 G/100ML; G/100ML
SOLUTION INTRAVENOUS CONTINUOUS
Status: DISCONTINUED | OUTPATIENT
Start: 2022-04-04 | End: 2022-04-05

## 2022-04-04 RX ORDER — FUROSEMIDE 20 MG/1
20 TABLET ORAL DAILY
Status: ON HOLD | COMMUNITY
End: 2022-06-04 | Stop reason: HOSPADM

## 2022-04-04 RX ADMIN — POTASSIUM CHLORIDE AND SODIUM CHLORIDE: 900; 150 INJECTION, SOLUTION INTRAVENOUS at 22:47

## 2022-04-04 RX ADMIN — ERGOCALCIFEROL 50000 UNITS: 1.25 CAPSULE ORAL at 21:00

## 2022-04-04 RX ADMIN — INSULIN LISPRO 2 UNITS: 100 INJECTION, SOLUTION INTRAVENOUS; SUBCUTANEOUS at 17:31

## 2022-04-04 RX ADMIN — POTASSIUM CHLORIDE 40 MEQ: 750 CAPSULE, EXTENDED RELEASE ORAL at 21:01

## 2022-04-04 RX ADMIN — CILOSTAZOL 100 MG: 100 TABLET ORAL at 21:01

## 2022-04-04 RX ADMIN — ROSUVASTATIN CALCIUM 20 MG: 20 TABLET, COATED ORAL at 21:01

## 2022-04-04 RX ADMIN — ENOXAPARIN SODIUM 40 MG: 40 INJECTION SUBCUTANEOUS at 17:31

## 2022-04-04 ASSESSMENT — PAIN SCALES - GENERAL: PAINLEVEL_OUTOF10: 7

## 2022-04-04 NOTE — LETTER
Pt to be going home. Will need HH. Lives in [place]. [employer]. #:        Daria Owens, BILLY  Care Coordinator  [facility]  [address], [place] [address]  Office:  [phone]  Fax:  [phone]  [email or web address]

## 2022-04-04 NOTE — PROGRESS NOTES
Med rec performed uilizing fill history from [facility]. See notes below:    -Added the following medications per fill history and patients wife:   Hydrochlorothiazide  Losartan    -Of note patient wife stated patient is still taking losartan, last filled was 12/30/21 with 5 refills left.      -Removed the following medication per fill history and patients wife, the following medication patient was on when he had COVID:  Magnesium Oxide  Potassium Chloride  Zinc Sulfate  Ascorbic Acid  Vitamin D     -Patient wife stated he still has an Inhaler as PRN    Susana Reyes CMA

## 2022-04-05 ENCOUNTER — OUTSIDE FACILITY SERVICE (OUTPATIENT)
Dept: CARDIOLOGY | Facility: CLINIC | Age: 82
End: 2022-04-05

## 2022-04-05 ENCOUNTER — APPOINTMENT (OUTPATIENT)
Dept: CT IMAGING | Facility: HOSPITAL | Age: 82
End: 2022-04-05
Attending: INTERNAL MEDICINE
Payer: MEDICARE

## 2022-04-05 PROBLEM — R94.31 EKG, ABNORMAL: Status: ACTIVE | Noted: 2022-04-05

## 2022-04-05 PROBLEM — E87.6 HYPOKALEMIA: Status: ACTIVE | Noted: 2022-04-05

## 2022-04-05 PROBLEM — Z86.73 HISTORY OF STROKE: Status: ACTIVE | Noted: 2022-04-05

## 2022-04-05 PROBLEM — Z87.891 PERSONAL HISTORY OF NICOTINE DEPENDENCE: Status: ACTIVE | Noted: 2022-04-05

## 2022-04-05 PROBLEM — R47.9 SPEECH PROBLEM: Status: ACTIVE | Noted: 2022-04-05

## 2022-04-05 PROBLEM — I73.9 PERIPHERAL VASCULAR DISEASE (HCC): Status: ACTIVE | Noted: 2022-04-05

## 2022-04-05 LAB
A/G RATIO: 1.3 (ref 0.8–2)
ALBUMIN SERPL-MCNC: 3 G/DL (ref 3.4–4.8)
ALP BLD-CCNC: 81 U/L (ref 25–100)
ALT SERPL-CCNC: 7 U/L (ref 4–36)
AMPHETAMINE SCREEN, URINE: NORMAL
ANION GAP SERPL CALCULATED.3IONS-SCNC: 12 MMOL/L (ref 3–16)
AST SERPL-CCNC: 13 U/L (ref 8–33)
BARBITURATE SCREEN URINE: NORMAL
BASOPHILS ABSOLUTE: 0.1 K/UL (ref 0–0.1)
BASOPHILS RELATIVE PERCENT: 0.9 %
BENZODIAZEPINE SCREEN, URINE: NORMAL
BILIRUB SERPL-MCNC: 0.5 MG/DL (ref 0.3–1.2)
BILIRUBIN URINE: NEGATIVE
BLOOD, URINE: NEGATIVE
BUN BLDV-MCNC: 20 MG/DL (ref 6–20)
BUPRENORPHINE QUAL, URINE: NORMAL
CALCIUM SERPL-MCNC: 9.5 MG/DL (ref 8.5–10.5)
CANNABINOID SCREEN URINE: NORMAL
CHLORIDE BLD-SCNC: 101 MMOL/L (ref 98–107)
CLARITY: CLEAR
CO2: 22 MMOL/L (ref 20–30)
COCAINE METABOLITE SCREEN URINE: NORMAL
COLOR: YELLOW
CREAT SERPL-MCNC: 1.3 MG/DL (ref 0.4–1.2)
EOSINOPHILS ABSOLUTE: 0.1 K/UL (ref 0–0.4)
EOSINOPHILS RELATIVE PERCENT: 1.4 %
GFR AFRICAN AMERICAN: >59
GFR NON-AFRICAN AMERICAN: 53
GLOBULIN: 2.3 G/DL
GLUCOSE BLD-MCNC: 150 MG/DL (ref 74–106)
GLUCOSE BLD-MCNC: 150 MG/DL (ref 74–106)
GLUCOSE BLD-MCNC: 179 MG/DL (ref 74–106)
GLUCOSE BLD-MCNC: 183 MG/DL (ref 74–106)
GLUCOSE BLD-MCNC: 249 MG/DL (ref 74–106)
GLUCOSE URINE: NEGATIVE MG/DL
HCT VFR BLD CALC: 39.8 % (ref 40–54)
HEMOGLOBIN: 12.8 G/DL (ref 13–18)
IMMATURE GRANULOCYTES #: 0.1 K/UL
IMMATURE GRANULOCYTES %: 0.8 % (ref 0–5)
KETONES, URINE: NEGATIVE MG/DL
LEUKOCYTE ESTERASE, URINE: NEGATIVE
LV EF: 55 %
LVEF MODALITY: NORMAL
LYMPHOCYTES ABSOLUTE: 2.2 K/UL (ref 1.5–4)
LYMPHOCYTES RELATIVE PERCENT: 34 %
Lab: NORMAL
MCH RBC QN AUTO: 29.6 PG (ref 27–32)
MCHC RBC AUTO-ENTMCNC: 32.2 G/DL (ref 31–35)
MCV RBC AUTO: 91.9 FL (ref 80–100)
METHADONE SCREEN, URINE: NORMAL
METHAMPHETAMINE, URINE: NORMAL
MICROSCOPIC EXAMINATION: ABNORMAL
MONOCYTES ABSOLUTE: 0.6 K/UL (ref 0.2–0.8)
MONOCYTES RELATIVE PERCENT: 9.8 %
NEUTROPHILS ABSOLUTE: 3.4 K/UL (ref 2–7.5)
NEUTROPHILS RELATIVE PERCENT: 53.1 %
NITRITE, URINE: NEGATIVE
OPIATE SCREEN URINE: NORMAL
PDW BLD-RTO: 14.8 % (ref 11–16)
PERFORMED ON: ABNORMAL
PH UA: 8.5 (ref 5–8)
PHENCYCLIDINE SCREEN URINE: NORMAL
PLATELET # BLD: 258 K/UL (ref 150–400)
PMV BLD AUTO: 10.5 FL (ref 6–10)
POTASSIUM SERPL-SCNC: 3.8 MMOL/L (ref 3.4–5.1)
PROPOXYPHENE SCREEN, URINE: NORMAL
PROTEIN UA: NEGATIVE MG/DL
RBC # BLD: 4.33 M/UL (ref 4.5–6)
SODIUM BLD-SCNC: 135 MMOL/L (ref 136–145)
SPECIFIC GRAVITY UA: 1.02 (ref 1–1.03)
TOTAL PROTEIN: 5.3 G/DL (ref 6.4–8.3)
TRICYCLIC, URINE: NORMAL
UR OXYCODONE RAPID SCREEN: NORMAL
URINE REFLEX TO CULTURE: ABNORMAL
URINE TYPE: ABNORMAL
UROBILINOGEN, URINE: 0.2 E.U./DL
WBC # BLD: 6.5 K/UL (ref 4–11)

## 2022-04-05 PROCEDURE — 99406 BEHAV CHNG SMOKING 3-10 MIN: CPT | Performed by: INTERNAL MEDICINE

## 2022-04-05 PROCEDURE — 92610 EVALUATE SWALLOWING FUNCTION: CPT

## 2022-04-05 PROCEDURE — 80307 DRUG TEST PRSMV CHEM ANLYZR: CPT

## 2022-04-05 PROCEDURE — 93005 ELECTROCARDIOGRAM TRACING: CPT

## 2022-04-05 PROCEDURE — 97161 PT EVAL LOW COMPLEX 20 MIN: CPT

## 2022-04-05 PROCEDURE — 36415 COLL VENOUS BLD VENIPUNCTURE: CPT

## 2022-04-05 PROCEDURE — 80053 COMPREHEN METABOLIC PANEL: CPT

## 2022-04-05 PROCEDURE — 97535 SELF CARE MNGMENT TRAINING: CPT

## 2022-04-05 PROCEDURE — 6360000002 HC RX W HCPCS: Performed by: PHYSICIAN ASSISTANT

## 2022-04-05 PROCEDURE — G0378 HOSPITAL OBSERVATION PER HR: HCPCS

## 2022-04-05 PROCEDURE — 96372 THER/PROPH/DIAG INJ SC/IM: CPT

## 2022-04-05 PROCEDURE — 93306 TTE W/DOPPLER COMPLETE: CPT

## 2022-04-05 PROCEDURE — 97116 GAIT TRAINING THERAPY: CPT

## 2022-04-05 PROCEDURE — 85025 COMPLETE CBC W/AUTO DIFF WBC: CPT

## 2022-04-05 PROCEDURE — 70450 CT HEAD/BRAIN W/O DYE: CPT

## 2022-04-05 PROCEDURE — 93306 TTE W/DOPPLER COMPLETE: CPT | Performed by: INTERNAL MEDICINE

## 2022-04-05 PROCEDURE — 99219 PR INITIAL OBSERVATION CARE/DAY 50 MINUTES: CPT | Performed by: INTERNAL MEDICINE

## 2022-04-05 PROCEDURE — 81003 URINALYSIS AUTO W/O SCOPE: CPT

## 2022-04-05 PROCEDURE — 97165 OT EVAL LOW COMPLEX 30 MIN: CPT

## 2022-04-05 PROCEDURE — 6370000000 HC RX 637 (ALT 250 FOR IP): Performed by: PHYSICIAN ASSISTANT

## 2022-04-05 RX ORDER — ASPIRIN 81 MG/1
81 TABLET, CHEWABLE ORAL DAILY
Status: DISCONTINUED | OUTPATIENT
Start: 2022-04-05 | End: 2022-04-06 | Stop reason: HOSPADM

## 2022-04-05 RX ADMIN — LOSARTAN POTASSIUM 100 MG: 50 TABLET, FILM COATED ORAL at 08:28

## 2022-04-05 RX ADMIN — CILOSTAZOL 100 MG: 100 TABLET ORAL at 20:26

## 2022-04-05 RX ADMIN — ENOXAPARIN SODIUM 40 MG: 40 INJECTION SUBCUTANEOUS at 08:28

## 2022-04-05 RX ADMIN — INSULIN LISPRO 1 UNITS: 100 INJECTION, SOLUTION INTRAVENOUS; SUBCUTANEOUS at 11:13

## 2022-04-05 RX ADMIN — INSULIN LISPRO 1 UNITS: 100 INJECTION, SOLUTION INTRAVENOUS; SUBCUTANEOUS at 20:26

## 2022-04-05 RX ADMIN — ALOGLIPTIN 12.5 MG: 12.5 TABLET, FILM COATED ORAL at 08:28

## 2022-04-05 RX ADMIN — AMLODIPINE BESYLATE 5 MG: 5 TABLET ORAL at 08:28

## 2022-04-05 RX ADMIN — PANTOPRAZOLE SODIUM 40 MG: 40 TABLET, DELAYED RELEASE ORAL at 06:47

## 2022-04-05 RX ADMIN — LEVOTHYROXINE SODIUM 50 MCG: 0.05 TABLET ORAL at 06:47

## 2022-04-05 RX ADMIN — TAMSULOSIN HYDROCHLORIDE 0.4 MG: 0.4 CAPSULE ORAL at 08:28

## 2022-04-05 RX ADMIN — ROSUVASTATIN CALCIUM 20 MG: 20 TABLET, COATED ORAL at 08:28

## 2022-04-05 RX ADMIN — ERGOCALCIFEROL 50000 UNITS: 1.25 CAPSULE ORAL at 08:28

## 2022-04-05 RX ADMIN — ASPIRIN 81 MG 81 MG: 81 TABLET ORAL at 11:35

## 2022-04-05 RX ADMIN — CILOSTAZOL 100 MG: 100 TABLET ORAL at 08:28

## 2022-04-05 ASSESSMENT — PAIN DESCRIPTION - LOCATION: LOCATION: ARM

## 2022-04-05 ASSESSMENT — PAIN SCALES - GENERAL
PAINLEVEL_OUTOF10: 2
PAINLEVEL_OUTOF10: 0

## 2022-04-05 ASSESSMENT — PAIN DESCRIPTION - PAIN TYPE: TYPE: ACUTE PAIN

## 2022-04-05 NOTE — PLAN OF CARE
Problem: Falls - Risk of:  Goal: Will remain free from falls  Description: Will remain free from falls  4/4/2022 1705 by Purnima Coelho RN  Outcome: Ongoing  Goal: Absence of physical injury  Description: Absence of physical injury  4/4/2022 1705 by Purnima Coelho RN  Outcome: Ongoing     Problem: Skin Integrity:  Goal: Will show no infection signs and symptoms  Description: Will show no infection signs and symptoms  4/4/2022 1705 by Purnima Coelho RN  Outcome: Ongoing  Goal: Absence of new skin breakdown  Description: Absence of new skin breakdown  4/4/2022 1705 by Purnima Coelho RN  Outcome: Ongoing     Problem: Musculor/Skeletal Functional Status  Goal: Highest potential functional level  4/5/2022 0153 by Reagan Mcbride RN  Outcome: Ongoing  4/4/2022 1705 by Purnima Coelho RN  Outcome: Ongoing  Goal: Absence of falls  4/4/2022 1705 by Purnima Coelho RN  Outcome: Ongoing     Problem: Daily Care:  Goal: Daily care needs are met  Description: Daily care needs are met  Outcome: Ongoing     Problem: Discharge Planning:  Goal: Patients continuum of care needs are met  Description: Patients continuum of care needs are met  Outcome: Ongoing

## 2022-04-05 NOTE — PROGRESS NOTES
Physical Therapy    Facility/Department: [facility] MED SURG  Initial Assessment    NAME: Belkys Marin  :   MRN: [id number]    Date of Service: 2022    Discharge Recommendations:  Continue to assess pending progress   PT Equipment Recommendations  Equipment Needed: Yes  Other: Rolling walker or wheel attachments for SW    Assessment   Body structures, Functions, Activity limitations: Decreased functional mobility ; Decreased posture;Decreased endurance;Decreased strength;Decreased balance;Decreased safe awareness  Assessment: PT eval completed. Patient able to perform bed mobility with modified independence. CGA sit to stand and transfers and to ambulate 200' with RW. Requires v/c for proper sequencing and safety. Patient presents with deficits in strength, endurance, balance, safety awarenss and independence but is expected to benefit from continued skilled PT intervention to improve his mobility status prior to D/C. Treatment Diagnosis: generalized weakness; falls  Prognosis: Good  Decision Making: Low Complexity  REQUIRES PT FOLLOW UP: Yes  Activity Tolerance  Activity Tolerance: Patient limited by endurance; Patient Tolerated treatment well       Patient Diagnosis(es): There were no encounter diagnoses. has a past medical history of Cataract, DM (diabetes mellitus) ([address] Erectile dysfunction, Hard of hearing, High cholesterol, Hypertension, Hypothyroidism, Peripheral vascular disease ([address] and Pneumonia. has a past surgical history that includes cyst removal; Foot surgery; and eye surgery (2012).     Restrictions  Restrictions/Precautions  Restrictions/Precautions: Fall Risk  Required Braces or Orthoses?: No  Vision/Hearing  Vision: Impaired  Vision Exceptions: Wears glasses for reading  Hearing: Exceptions to [facility]  Hearing Exceptions: Hard of hearing/hearing concerns;Bilateral hearing aid     Subjective  General  Chart Reviewed: Yes  Patient assessed for rehabilitation services?: Yes  Additional Pertinent Hx: Multiple falls at home. Family / Caregiver Present: No  Referring Practitioner: MORRIS Escobar  Follows Commands: Within Functional Limits  Subjective  Subjective: NAD. Patient pleasant and cooperative. Pain Screening  Patient Currently in Pain: Yes  Pain Assessment  Pain Level: 2  Pain Type: Acute pain  Pain Location: Arm  Vital Signs  Patient Currently in Pain: Yes  Oxygen Therapy  O2 Device: None (Room air)  Pre Treatment Pain Screening  Intervention List: Patient able to continue with treatment    Orientation  Orientation  Overall Orientation Status: Within Functional Limits  Social/Functional History  Social/Functional History  Lives With: Spouse  Type of Home: House  Home Layout: Two level,Able to Live on Main level with bedroom/bathroom  Home Access: Stairs to enter without rails  Entrance Stairs - Number of Steps: 1  Bathroom Shower/Tub: Tub/Shower unit  Bathroom Toilet: Handicap height (Frame of BSC over regular toilet.)  Bathroom Equipment: Shower chair,3-in-1 commode  Bathroom Accessibility: Walker accessible  Home Equipment: Standard walker,Cane,Crutches  Receives Help From: Family  ADL Assistance: Independent  Homemaking Assistance: Independent  Homemaking Responsibilities: Yes  Ambulation Assistance: Independent (Uses SW- frequent falls recently.)  Transfer Assistance: Independent  Active : No  Patient's  Info: Daughter drives for patient and his wife. Cognition        Objective     Observation/Palpation  Posture: Good  Observation: NAD.  Pleasant and cooperative and agreeable to work with PT. RA.    PROM RLE (degrees)  RLE PROM: WFL  AROM RLE (degrees)  RLE AROM: WFL  PROM LLE (degrees)  LLE PROM: WFL  AROM LLE (degrees)  LLE AROM : WFL  PROM RUE (degrees)  RUE PROM: WFL  AROM RUE (degrees)  RUE AROM : WFL  PROM LUE (degrees)  LUE PROM: WFL  AROM LUE (degrees)  LUE AROM : WFL  Strength RLE  Strength RLE: WFL  Strength LLE  Strength LLE: WFL  Strength RUE  Strength RUE: WFL  Comment: 3+_/5  Strength LUE  Strength LUE: WFL  Comment: 3+/5        Bed mobility  Supine to Sit: Modified independent  Sit to Supine: Modified independent  Scooting: Modified independent  Transfers  Sit to Stand: Contact guard assistance  Stand to sit: Contact guard assistance  Bed to Chair: Contact guard assistance  Comment: v/c for proper sequencing  Ambulation  Ambulation?: Yes  Ambulation 1  Surface: level tile  Device: Rolling Walker  Assistance: Contact guard assistance  Gait Deviations: Decreased step length;Decreased step height  Distance: 200'  Comments: Forward flexed posture     Balance  Posture: Fair  Sitting - Static: Good  Sitting - Dynamic: Good  Standing - Static: Good;-  Standing - Dynamic: Good;-        Plan   Plan  Times per week: 4-5x/week  Times per day: Daily  Current Treatment Recommendations: Meenu Yared Training,Patient/Caregiver Education & Training,Balance Training,Gait Training,Home Exercise Program,Functional Mobility Training,Safety Education & Training  Safety Devices  Type of devices: Call light within reach,Left in bed,Nurse notified    G-Code       OutComes Score                                                  AM-PAC Score             Goals  Long term goals  Time Frame for Long term goals : 10 days  Long term goal 1: Patient will perform all bed mobility independently. Long term goal 2: Patient will perform sit to stand and transfers safety with SBA and RW. Long term goal 3: Patient will ambulate 400' with RW and SBA. Long term goal 4: Patient will demonstrate independence with HEP. Patient Goals   Patient goals : Return home.        Therapy Time   Individual Concurrent Group Co-treatment   Time In [facility] 93         Time Out 1005         Minutes [address]

## 2022-04-05 NOTE — H&P
History and Physical    Patient:  Betty Montero    CHIEF COMPLAINT:    Falls, weakness    HISTORY OF PRESENT ILLNESS:   The patient is a [de-identified] y.o. male with past medical history of CKD, diabetes, hypothyroidism, hypertension, peripheral vascular disease, stroke, tobacco abuse who presents due to generalized weakness and multiple falls. He was directly admitted by his PCP Rajani Metzger for. Patient is alert and oriented x3. States that since he had Covid at the end of January he has been sick and had a more difficult time at home. For several weeks now he has been falling. Denies loss of consciousness or hitting his head. He is able to walk but states sometimes he just falls. He feels very weak. Has some difficulty with speech and says awhile back he felt like he couldn't talk at all. This was several weeks ago. He lives at home with his wife. No confusion. No chest pain, shortness of breath, diarrhea, dysuria, abdominal pain, fever. No known sick contact. Past Medical History:      Diagnosis Date    Cataract     DM (diabetes mellitus) ([facility]     Erectile dysfunction     Hard of hearing     hearing aid    High cholesterol     Hypertension     Hypothyroidism     Peripheral vascular disease ([facility])     Pneumonia        Past Surgical History:      Procedure Laterality Date    CYST REMOVAL      under right arm     EYE SURGERY  01/2012    cataract    FOOT SURGERY      growth removed; left       Medications Prior to Admission:    Prior to Admission medications    Medication Sig Start Date End Date Taking?  Authorizing Provider   hydroCHLOROthiazide (HYDRODIURIL) 25 MG tablet Take 25 mg by mouth daily   Yes Historical Provider, MD   losartan (COZAAR) 100 MG tablet Take 100 mg by mouth daily   Yes Historical Provider, MD   furosemide (LASIX) 20 MG tablet Take 20 mg by mouth daily   Yes Historical Provider, MD   albuterol sulfate  (90 Base) MCG/ACT inhaler Inhale 2 puffs into the lungs every 6 hours as needed for Wheezing 2/3/22   MORRIS Yañez   tamsulosin [facility]) 0.4 MG capsule Take 1 capsule by mouth daily 2/4/22   MORRIS Yañez   levothyroxine (SYNTHROID) 50 MCG tablet take 1 tablet by mouth once daily 2/3/22   MORRIS Yañez   rosuvastatin (CRESTOR) 20 MG tablet Take 20 mg by mouth daily    Historical Provider, MD   SITagliptin (JANUVIA) 50 MG tablet Take 50 mg by mouth daily    Historical Provider, MD   omeprazole (PRILOSEC) 20 MG delayed release capsule Take 20 mg by mouth daily    Historical Provider, MD   cilostazol (PLETAL) 100 MG tablet Take 1 tablet by mouth 2 times daily. 5/31/12   Jana Navarro MD   amlodipine (NORVASC) 5 MG tablet Take 1 tablet by mouth daily. 2/2/12   Jana Navarro MD       Allergies:  Patient has no known allergies. Social History:   TOBACCO:   reports that he has been smoking cigarettes. He has a 26.50 pack-year smoking history. He has never used smokeless tobacco.  ETOH:   reports no history of alcohol use. OCCUPATION:  None      Family History:       Problem Relation Age of Onset    Stroke Mother     Heart Disease Daughter         MI    Stroke Daughter        Review of system  Constitutional:  Denies fever or chills. positive for generalized weakness. Positive for falls. Eyes:  Denies eye pain or redness  HENT:  Denies nasal congestion or sore throat   Respiratory:  Denies cough or shortness of breath   Cardiovascular:  Denies chest pain or edema   GI:  Denies abdominal pain, nausea, vomiting, bloody stools or diarrhea   :  Denies dysuria or frequency  Musculoskeletal:  Denies acute neck pain or body aches  Integument:  Denies rash or itching  Neurologic:  Denies headache, dizziness, numbness, tingling or unilateral weakness. Positive for speech changes.    Psychiatric:  Denies acute depression or acute anxiety      Vital Signs  Temp: 97.7 °F (36.5 °C)  Pulse: 99  Resp: 18  BP: 133/70  SpO2: 99 %  O2 Device: None (Room air)       vital signs reviewed in electronic chart. Physical exam  Constitutional:  Well developed, well nourished, no acute distress  Eyes:  PERRL, no scleral icterus, conjunctiva normal   HENT:  Atraumatic, external ears normal, nose normal, oropharynx moist, no pharyngeal exudates. Neck- supple, no JVD, no lymphadenopathy  Respiratory:  No respiratory distress, no wheezing, rales or rhonchi detected  Cardiovascular:  Normal rate, normal rhythm, no murmurs, no gallops, no rubs, no edema   GI:  Soft, nondistended, normal bowel sounds, nontender, no voluntary guarding  Musculoskeletal:  No cyanosis or obvious acute deformity. Moving all extremities   Integument:  Warm and dry. Neurologic:  Alert & oriented x 3, no apparent focal deficits noted,  strength equal bilaterally, speech difficulty noted    Psychiatric:  Speech and behavior appropriate         Lab Results   Component Value Date     (L) 04/05/2022    K 3.8 04/05/2022     04/05/2022    CO2 22 04/05/2022    BUN 20 04/05/2022    CREATININE 1.3 (H) 04/05/2022    GLUCOSE 150 (H) 04/05/2022    CALCIUM 9.5 04/05/2022    PROT 5.3 (L) 04/05/2022    LABALBU 3.0 (L) 04/05/2022    BILITOT 0.5 04/05/2022    ALKPHOS 81 04/05/2022    AST 13 04/05/2022    ALT 7 04/05/2022    LABGLOM 53 (L) 04/05/2022    GFRAA >59 04/05/2022    AGRATIO 1.3 04/05/2022    GLOB 2.3 04/05/2022           Lab Results   Component Value Date    WBC 6.5 04/05/2022    HGB 12.8 (L) 04/05/2022    HCT 39.8 (L) 04/05/2022    MCV 91.9 04/05/2022     04/05/2022          Assessment and Plan     Active Hospital Problems    Diagnosis Date Noted    Hypokalemia [E87.6]  - monitor and replace as needed 04/05/2022    Speech problem [R47.9]  - concern for possible stroke with speech cifficulty over last few weeks. Patient states he had increasing difficulty with speech for last several weeks.    - ct head pending   - monitor  04/05/2022    Peripheral vascular disease ([facility] [I73.9]  - continue pletal and statin 04/05/2022    EKG, abnormal [R94.31]  - EKG with low voltage qrs and few q waves noted and pvcs  - obtain echo   - monitor on telemetry   - monitor electrolytes  04/05/2022    History of stroke [Z86.73]  - CT head January 2022 with old infarct in right thalamus   - Add ASA 81 mg daily and continue rosuvastatin   - patient with multiple falls and speech difficulty on exam   - Repeat CT head ordered  04/05/2022    Generalized weakness [R53.1]  - patient directly admitted due to generalized weakness, falls   - on admit mild hypokalemia and hyponatremia noted. Vit d deficient. Concern for stroke over last few weeks due to speech changes, falls. - CT head ordered   - pt/ot consulted   - Check UA   - SLP consulted and no evidence of dysphagia   - fall precautions  02/01/2022    Vitamin D deficiency [E55.9]  - start vit d 02/01/2022    CKD (chronic kidney disease) stage 3, GFR 30-59 ml/min ([facility]) [N18.30]  - Cr 1.3 which seems near baseline   - continue to monitor  02/01/2022    Hypothyroidism [E03.9]  - continue synthroid  - tsh wnl 03/28/2011    Diabetes mellitus, type II ([address] [E11.9]  - A1C 7.9   - continue alogliptin  - monitor fsbg achs and cover with low dose ssi as needed   - encourage diabetic diet  03/28/2011    Benign essential HTN [I10]  - continue current regimen   - hctz and lasix on hold on admit with generalized weakness and electrolyte abnormalities   - resume lasix 4/5   - monitor  03/28/2011   · Personal history of nicotine dependence   - Patient was strongly encouraged to stop smoking. Risks of continued smoking and benefits of smoking cessation were discussed. - nicotine patch ordered     Patient was seen and examined with Dr. Taylor Zuñiga. After reviewing patient data and diagnostic testing the plan of care was established in conjunction with Dr. Taylor Zuñiga.       MORRIS Velazquez certifies per CMS regulation for 42 .15(a), that the patient may reasonably be expected to be discharged or transferred to a hospital within 96 hours after admission to [address] [place] [address] and [facility]    Electronically signed by MORRIS Fuller on 4/5/2022 at 12:38 PM

## 2022-04-05 NOTE — ACP (ADVANCE CARE PLANNING)
Advance Care Planning     General Advance Care Planning (ACP) Conversation    Date of Conversation: 4/5/2022  Conducted with: Patient with Decision Making Capacity    Healthcare Decision Maker:    Primary Decision Maker: [place] - Spouse - [phone]    Secondary Decision Maker: Enedelia Blizzard - Child - [phone]  Click here to complete Healthcare Decision Makers including selection of the Healthcare Decision Maker Relationship (ie \"Primary\"). Today we documented Decision Maker(s) consistent with Legal Next of Kin hierarchy.     Content/Action Overview:  Has NO ACP documents/care preferences - information provided, considering goals and options  Reviewed DNR/DNI and patient elects Full Code (Attempt Resuscitation)  artificial nutrition, ventilation preferences and resuscitation preferences      Length of Voluntary ACP Conversation in minutes:  <16 minutes (Non-Billable)    Manish Maynard

## 2022-04-05 NOTE — PLAN OF CARE
Problem: Falls - Risk of:  Goal: Will remain free from falls  Description: Will remain free from falls  Outcome: Met This Shift  Goal: Absence of physical injury  Description: Absence of physical injury  Outcome: Met This Shift     Problem: Skin Integrity:  Goal: Will show no infection signs and symptoms  Description: Will show no infection signs and symptoms  Outcome: Met This Shift  Goal: Absence of new skin breakdown  Description: Absence of new skin breakdown  Outcome: Met This Shift     Problem: Musculor/Skeletal Functional Status  Goal: Absence of falls  Outcome: Met This Shift     Problem: Infection:  Goal: Will remain free from infection  Description: Will remain free from infection  Outcome: Met This Shift     Problem: Safety:  Goal: Free from accidental physical injury  Description: Free from accidental physical injury  Outcome: Met This Shift  Goal: Free from intentional harm  Description: Free from intentional harm  Outcome: Met This Shift     Problem: Daily Care:  Goal: Daily care needs are met  Description: Daily care needs are met  4/5/2022 0956 by Jayla Jackson RN  Outcome: Met This Shift  4/5/2022 0153 by Daniella Harrison RN  Outcome: Ongoing     Problem: Pain:  Goal: Patient's pain/discomfort is manageable  Description: Patient's pain/discomfort is manageable  Outcome: Met This Shift  Goal: Pain level will decrease  Description: Pain level will decrease  Outcome: Met This Shift  Goal: Control of acute pain  Description: Control of acute pain  Outcome: Met This Shift  Goal: Control of chronic pain  Description: Control of chronic pain  Outcome: Met This Shift     Problem: Musculor/Skeletal Functional Status  Goal: Highest potential functional level  4/5/2022 0956 by Jayla Jackson RN  Outcome: Ongoing  4/5/2022 0153 by Daniella Harrison RN  Outcome: Ongoing     Problem: Skin Integrity:  Goal: Skin integrity will stabilize  Description: Skin integrity will stabilize  Outcome: Ongoing     Problem: Discharge Planning:  Goal: Patients continuum of care needs are met  Description: Patients continuum of care needs are met  4/5/2022 0956 by Sonya Iverson RN  Outcome: Ongoing  4/5/2022 0153 by Saúl Sharma RN  Outcome: Ongoing

## 2022-04-05 NOTE — PROGRESS NOTES
Speech Language Pathology  Facility/Department: [facility] MED SURG   CLINICAL BEDSIDE SWALLOW EVALUATION    NAME: Claire Rogers  :   MRN: [id number]    ADMISSION DATE: 2022  ADMITTING DIAGNOSIS: has Hypothyroidism; Diabetes mellitus, type II ([facility] Hyperlipidemia; HTN (hypertension); Acute hypoxemic respiratory failure due to COVID-19 [facility]); Vitamin D deficiency; Stage 3 chronic kidney disease ([facility] Acute kidney injury superimposed on CKD ([facility] Thrombocytopenia ([facility] Generalized weakness; Acute encephalopathy; Diarrhea; Hypokalemia; and Speech problem on their problem list.  ONSET DATE: 2022    Recent Chest Xray/CT of Chest:  Date   N/A    Date of Eval: 2022  Evaluating Therapist: VISHNU Marques    Current Diet level:  Current Diet : Dysphagia Soft and Bite-Sized (Dysphagia III)  Current Liquid Diet : Thin    Primary Complaint  Patient Complaint: N/A    Pain:  Pain Assessment  Pain Assessment: 0-10  Pain Level: 2  Pain Type: Acute pain  Pain Location: Arm    Reason for Referral  Claire Rogers was referred for a bedside swallow evaluation to assess the efficiency of his swallow function, identify signs and symptoms of aspiration and make recommendations regarding safe dietary consistencies, effective compensatory strategies, and safe eating environment. Impression  Dysphagia Diagnosis: Swallow function appears grossly intact  Dysphagia Impression : Patient presents with oral motor strength and ROM-WFL, Patient trialed with consecutive trials with regular, soft solids, mixed and thin liquid consistencies. Patient demonstrates with appropriate mastication, bolus manipulation and breakdown of bolus with A/P oral transit with no oral residue or oral dyshagia noted. Patient demonstrates with adequate swallow initiation, laryngeal elevation with no signs of aspiraiton.   Dysphagia Outcome Severity Scale: Level 7: Normal in all situations     Treatment Plan  Requires SLP Intervention: No  Duration/Frequency of Treatment: na  D/C Recommendations:  (Patient plans to return home with spouse upon discharge)    Recommended Diet and Intervention  Diet Solids Recommendation: Regular  Liquid Consistency Recommendation: Thin  Recommended Form of Meds: Whole with water    Therapeutic Interventions: Patient/Family education    Compensatory Swallowing Strategies  Compensatory Swallowing Strategies: Alternate solids and liquids;Upright as possible for all oral intake;Remain upright for 30-45 minutes after meals    Treatment/Goals  Short-term Goals  Timeframe for Short-term Goals: N/A  Goal 1: ST eval only  Long-term Goals  Timeframe for Long-term Goals: N/A  Goal 1: ST eval only    General  Chart Reviewed: Yes  Comments: Patient seated upright in bed, patient oriented x 3, patient's wife is present during this evaluation  Subjective  Subjective: Patient pleasant and cooperative  Behavior/Cognition: Alert;Pleasant mood; Cooperative  Respiratory Status: Room air  O2 Device: None (Room air)  Communication Observation: Functional  Follows Directions: Simple  Dentition: Dentures top;Dentures bottom; Adequate  Patient Positioning: Upright in bed  Baseline Vocal Quality: Normal  Volitional Cough: Strong  Prior Dysphagia History: no prior history of Dysphagia  Consistencies Administered: Reg solid; Dysphagia Soft and Bite-Sized (Dysphagia III); Thin - straw; Thin - cup;Mixed Consistencies    Vision/Hearing  Vision  Vision: Impaired  Vision Exceptions: Wears glasses for reading  Hearing  Hearing: Exceptions to [facility]  Hearing Exceptions: Hard of hearing/hearing concerns;Bilateral hearing aid    Oral Motor Deficits  Oral/Motor  Oral Motor:  Within functional limits    Oral Phase Dysfunction  Oral Phase  Oral Phase: WNL  Oral Phase  Oral Phase - Comment: Patient demonstrates with upper and lower dentures-WFL, lingual/labial strength and ROM-WFL, Patient tolerates consecutive trials with  regular, mixed, soft solids and thin liquid consistencies with adequate mastication, bolus manipulation and breakdown of bolus with A/P oral transit -WFL. Patient presents with no signs of oral residue or oral dysphagia     Indicators of Pharyngeal Phase Dysfunction   Pharyngeal Phase   Pharyngeal: Patient demonstrates with swallow initiation and laryngeal elevation -WFL, patient tolerates consecutive trials of regular, mixed, soft solids and thin liquids( cup Shirlie Eves) with no signs of aspiration. Prognosis  Prognosis  Prognosis for safe diet advancement: excellent  Barriers/Prognosis Comment: n/a  Individuals consulted  Consulted and agree with results and recommendations: Patient;RN    Education  Patient Education: Patient education with general swallow precautions  Patient Education Response: Verbalizes understanding  Safety Devices in place: Yes  Type of devices: All fall risk precautions in place; Left in bed;Bed alarm in place;Call light within reach       Therapy Time  SLP Individual Minutes  Time In: 1150  Time Out: [phone]  Minutes: [address] [place], [place]  4/5/2022 12:23 PM

## 2022-04-05 NOTE — PROGRESS NOTES
Occupational Therapy   Occupational Therapy Initial Assessment  Date: 2022   Patient Name: Jef Cedeño  MRN: [id number]     : 1940    Date of Service: 2022    Discharge Recommendations:  Continue to assess pending progress  OT Equipment Recommendations  Other: RW or wheels for his standard walker    Assessment   Performance deficits / Impairments: Decreased functional mobility ; Decreased balance;Decreased ADL status; Decreased endurance;Decreased strength;Decreased high-level IADLs  Assessment: Pt agreeable to OT services. Pt oriented x3. Pt come to sit at EOB with MOD I. Pt donned shoes seated at EOB with SBA. Pt come to stand with CGA<>SBA. Pt ambulated in hallway using RW with min verbal cuing to roll walker x200 feet CGA. Pt presents with BUE weakness. Pt returned to room and toileted with CGA<>SBA. Pt required CGA for LB dressing seated on toilet. Pt returned to bed and left with call light in reach. Pt will benefit from skilled OT services. Prognosis: Good  Decision Making: Low Complexity  REQUIRES OT FOLLOW UP: Yes           Patient Diagnosis(es): There were no encounter diagnoses. has a past medical history of Cataract, DM (diabetes mellitus) ([address] Erectile dysfunction, Hard of hearing, High cholesterol, Hypertension, Hypothyroidism, Peripheral vascular disease ([address] and Pneumonia. has a past surgical history that includes cyst removal; Foot surgery; and eye surgery (2012). Restrictions  Restrictions/Precautions  Restrictions/Precautions: Fall Risk  Required Braces or Orthoses?: No    Subjective   General  Chart Reviewed: Yes  Patient assessed for rehabilitation services?: Yes  Family / Caregiver Present: No  Referring Practitioner: MORRIS Guardado  Diagnosis: Generalized weakness  Subjective  Subjective: Pt agreeable to OT services.  Pt states he has had several falls recently at home and has been weak  Patient Currently in Pain: Yes  Vital Signs  Patient Currently in Pain: Yes  Social/Functional History  Social/Functional History  Lives With: Spouse  Type of Home: House  Home Layout: Two level,Able to Live on Main level with bedroom/bathroom  Home Access: Stairs to enter without rails  Entrance Stairs - Number of Steps: 1  Bathroom Shower/Tub: Tub/Shower unit  Bathroom Toilet: Handicap height (Frame of BSC over regular toilet.)  Bathroom Equipment: Shower chair,3-in-1 commode  Bathroom Accessibility: Walker accessible  Home Equipment: Standard walker,Cane,Crutches  Receives Help From: Family  ADL Assistance: Independent  Homemaking Assistance: Independent  Homemaking Responsibilities: Yes  Ambulation Assistance: Independent (Uses SW- frequent falls recently.)  Transfer Assistance: Independent  Active : No  Patient's  Info: Daughter drives for patient and his wife. Objective   Vision: Impaired  Vision Exceptions: Wears glasses for reading  Hearing: Exceptions to [facility]  Hearing Exceptions: Hard of hearing/hearing concerns;Bilateral hearing aid    Orientation  Overall Orientation Status: Within Functional Limits  Observation/Palpation  Posture: Good  Observation: NAD. Pleasant and cooperative and agreeable to work with OT. RA.   Functional Mobility  Functional - Mobility Device: Rolling Walker  Assist Level: Contact guard assistance  Functional Mobility Comments: 200 feet  ADL  LE Dressing: Contact guard assistance;Stand by assistance  Toileting: Stand by assistance;Contact guard assistance  Tone RUE  RUE Tone: Normotonic  Tone LUE  LUE Tone: Normotonic  Coordination  Movements Are Fluid And Coordinated: Yes     Bed mobility  Supine to Sit: Modified independent  Sit to Supine: Modified independent  Scooting: Modified independent  Transfers  Stand Pivot Transfers: Contact guard assistance;Stand by assistance  Sit to stand: Contact guard assistance;Stand by assistance     Cognition  Overall Cognitive Status: WFL                 LUE AROM (degrees)  LUE AROM : WFL  RUE AROM (degrees)  RUE AROM : WFL  LUE Strength  L Shoulder Flex: 3+/5  L Elbow Flex: 3+/5  L Elbow Ext: 3+/5  L Hand General: 3+/5  RUE Strength  R Shoulder Flex: 3+/5  R Elbow Flex: 3+/5  R Elbow Ext: 3+/5  R Hand General: 3+/5                   Plan   Plan  Times per week: 3-5  Times per day: Daily  Plan weeks: 1  Current Treatment Recommendations: Strengthening,Neuromuscular Re-education,Balance Training,Functional Mobility Training,Self-Care / ADL,Equipment Evaluation, Education, & procurement,Patient/Caregiver Education & Training      Goals  Short term goals  Time Frame for Short term goals: 1 week  Short term goal 1: Pt to complete dressing with MOD I. Short term goal 2: Pt to complete bathing with MOD I. Short term goal 3: Pt to complete toileting with MOD I. Short term goal 4: Pt to complete shower transfer with MOD I. Short term goal 5: Pt to tolerate x20 minutes of activity to increase functional activity tolerance. Therapy Time   Individual Concurrent Group Co-treatment   Time In 5875         Time Out 1011         Minutes 33              This note serves as a DC summary in the event of pt discharge.      Dennise Yates, OTR/L

## 2022-04-05 NOTE — FLOWSHEET NOTE
04/05/22 0800   Assessment   Charting Type Shift assessment   Neurological   Neuro (WDL) WDL   Level of Consciousness Alert (0)   Antonio Coma Scale   Eye Opening 4   Best Verbal Response 5   Best Motor Response 6   Antonio Coma Scale Score 15   HEENT   HEENT (WDL) X   Right Ear Impaired hearing   Left Ear Impaired hearing   Teeth Missing teeth;Dentures upper;Dentures lower   Respiratory   Respiratory (WDL) WDL   Cardiac   Cardiac (WDL) X   Cardiac Regularity Regular   Cardiac Rhythm Sinus rhythm;PVC   Cardiac Monitor   Telemetry Monitor On Yes   Telemetry Audible Yes   Telemetry Alarms Set Yes   Telemetry Box Number [id number]   Gastrointestinal   Abdominal (WDL) X   Abdomen Inspection Rounded   Peripheral Vascular   Peripheral Vascular (WDL) X   Edema Right lower extremity; Left lower extremity   RLE Edema Trace   LLE Edema Trace   RLE Neurovascular Assessment   Color Appropriate for ethnicity   R Pedal Pulse +1   LLE Neurovascular Assessment   Color Appropriate for ethnicity   L Pedal Pulse +1   Skin Color/Condition   Skin Color/Condition (WDL) X   Skin Color Pale   Skin Condition/Temp Warm;Dry   Skin Integrity   Skin Integrity (WDL) X   Skin Integrity Abrasion   Location rue   Preventative Dressing Yes   Musculoskeletal   Musculoskeletal (WDL) X   RUE Full movement   LUE Full movement   RL Extremity Weakness; Unsteady; Full movement   LL Extremity Weakness; Unsteady; Full movement   Genitourinary   Genitourinary (WDL) WDL   Wound 04/04/22 Brachial Anterior;Right Right upper arm Skin abrasion   Date First Assessed/Time First Assessed: 04/04/22 1540   Present on Hospital Admission: Yes  Primary Wound Type: Traumatic  Location: Brachial  Wound Location Orientation: Anterior;Right  Wound Description (Comments): Right upper arm Skin abrasion   Dressing Status Clean;Dry; Intact   Wound 04/04/22 Radial Right Right forearm Skin abrasion   Date First Assessed/Time First Assessed: 04/04/22 1540   Primary Wound Type: Traumatic Location: Radial  Wound Location Orientation: Right  Wound Description (Comments): Right forearm Skin abrasion   Dressing Status Clean;Dry; Intact   Psychosocial   Psychosocial (WDL) WDL

## 2022-04-05 NOTE — PROGRESS NOTES
Offered spiritual care to patient. Patient requested prayer, so I held prayer with the patient. Told patient to reach out if they needed anything further.

## 2022-04-05 NOTE — FLOWSHEET NOTE
04/05/22 0800   Assessment   Charting Type Shift assessment   Neurological   Neuro (WDL) WDL   Level of Consciousness Alert (0)   Antonio Coma Scale   Eye Opening 4   Best Verbal Response 5   Best Motor Response 6   Antonio Coma Scale Score 15   HEENT   HEENT (WDL) X   Right Ear Impaired hearing   Left Ear Impaired hearing   Teeth Missing teeth;Dentures upper;Dentures lower   Respiratory   Respiratory (WDL) WDL   Cardiac   Cardiac (WDL) WDL   Cardiac Monitor   Telemetry Monitor On Yes   Telemetry Audible Yes   Telemetry Alarms Set Yes   Telemetry Box Number [id number]   Gastrointestinal   Abdominal (WDL) X   Abdomen Inspection Rounded   Peripheral Vascular   Peripheral Vascular (WDL) X   Edema Right lower extremity; Left lower extremity   RLE Edema Trace   LLE Edema Trace   RLE Neurovascular Assessment   Color Appropriate for ethnicity   R Pedal Pulse +1   LLE Neurovascular Assessment   Color Appropriate for ethnicity   L Pedal Pulse +1   Skin Color/Condition   Skin Color/Condition (WDL) X   Skin Color Pale   Skin Condition/Temp Warm;Dry   Skin Integrity   Skin Integrity (WDL) X   Skin Integrity Abrasion   Location rue   Preventative Dressing Yes   Musculoskeletal   Musculoskeletal (WDL) X   RUE Full movement   LUE Full movement   RL Extremity Weakness; Unsteady; Full movement   LL Extremity Weakness; Unsteady; Full movement   Genitourinary   Genitourinary (WDL) WDL   Wound 04/04/22 Brachial Anterior;Right Right upper arm Skin abrasion   Date First Assessed/Time First Assessed: 04/04/22 1540   Present on Hospital Admission: Yes  Primary Wound Type: Traumatic  Location: Brachial  Wound Location Orientation: Anterior;Right  Wound Description (Comments): Right upper arm Skin abrasion   Dressing Status Clean;Dry; Intact   Wound 04/04/22 Radial Right Right forearm Skin abrasion   Date First Assessed/Time First Assessed: 04/04/22 1540   Primary Wound Type: Traumatic  Location: Radial  Wound Location Orientation: Right  Wound Description (Comments): Right forearm Skin abrasion   Dressing Status Clean;Dry; Intact   Psychosocial   Psychosocial (WDL) WDL

## 2022-04-06 VITALS
OXYGEN SATURATION: 99 % | HEIGHT: 69 IN | TEMPERATURE: 97.7 F | BODY MASS INDEX: 24.51 KG/M2 | DIASTOLIC BLOOD PRESSURE: 64 MMHG | RESPIRATION RATE: 18 BRPM | HEART RATE: 100 BPM | SYSTOLIC BLOOD PRESSURE: 115 MMHG | WEIGHT: 165.5 LBS

## 2022-04-06 LAB
A/G RATIO: 1.1 (ref 0.8–2)
ALBUMIN SERPL-MCNC: 2.9 G/DL (ref 3.4–4.8)
ALP BLD-CCNC: 80 U/L (ref 25–100)
ALT SERPL-CCNC: <5 U/L (ref 4–36)
ANION GAP SERPL CALCULATED.3IONS-SCNC: 10 MMOL/L (ref 3–16)
AST SERPL-CCNC: 12 U/L (ref 8–33)
BASOPHILS ABSOLUTE: 0.1 K/UL (ref 0–0.1)
BASOPHILS RELATIVE PERCENT: 0.8 %
BILIRUB SERPL-MCNC: 0.4 MG/DL (ref 0.3–1.2)
BUN BLDV-MCNC: 19 MG/DL (ref 6–20)
CALCIUM SERPL-MCNC: 9.3 MG/DL (ref 8.5–10.5)
CHLORIDE BLD-SCNC: 104 MMOL/L (ref 98–107)
CO2: 22 MMOL/L (ref 20–30)
CREAT SERPL-MCNC: 1.2 MG/DL (ref 0.4–1.2)
EOSINOPHILS ABSOLUTE: 0.1 K/UL (ref 0–0.4)
EOSINOPHILS RELATIVE PERCENT: 1.2 %
GFR AFRICAN AMERICAN: >59
GFR NON-AFRICAN AMERICAN: 58
GLOBULIN: 2.6 G/DL
GLUCOSE BLD-MCNC: 177 MG/DL (ref 74–106)
GLUCOSE BLD-MCNC: 181 MG/DL (ref 74–106)
GLUCOSE BLD-MCNC: 193 MG/DL (ref 74–106)
HCT VFR BLD CALC: 38.2 % (ref 40–54)
HEMOGLOBIN: 12.2 G/DL (ref 13–18)
IMMATURE GRANULOCYTES #: 0.1 K/UL
IMMATURE GRANULOCYTES %: 0.9 % (ref 0–5)
LYMPHOCYTES ABSOLUTE: 1.6 K/UL (ref 1.5–4)
LYMPHOCYTES RELATIVE PERCENT: 24.8 %
MAGNESIUM: 2 MG/DL (ref 1.7–2.4)
MCH RBC QN AUTO: 28.9 PG (ref 27–32)
MCHC RBC AUTO-ENTMCNC: 31.9 G/DL (ref 31–35)
MCV RBC AUTO: 90.5 FL (ref 80–100)
MONOCYTES ABSOLUTE: 0.6 K/UL (ref 0.2–0.8)
MONOCYTES RELATIVE PERCENT: 9.1 %
NEUTROPHILS ABSOLUTE: 4.2 K/UL (ref 2–7.5)
NEUTROPHILS RELATIVE PERCENT: 63.2 %
PDW BLD-RTO: 15.2 % (ref 11–16)
PERFORMED ON: ABNORMAL
PERFORMED ON: ABNORMAL
PLATELET # BLD: 284 K/UL (ref 150–400)
PMV BLD AUTO: 9.6 FL (ref 6–10)
POTASSIUM SERPL-SCNC: 3.5 MMOL/L (ref 3.4–5.1)
RBC # BLD: 4.22 M/UL (ref 4.5–6)
SODIUM BLD-SCNC: 136 MMOL/L (ref 136–145)
TOTAL PROTEIN: 5.5 G/DL (ref 6.4–8.3)
WBC # BLD: 6.6 K/UL (ref 4–11)

## 2022-04-06 PROCEDURE — G0378 HOSPITAL OBSERVATION PER HR: HCPCS

## 2022-04-06 PROCEDURE — 97110 THERAPEUTIC EXERCISES: CPT

## 2022-04-06 PROCEDURE — 36415 COLL VENOUS BLD VENIPUNCTURE: CPT

## 2022-04-06 PROCEDURE — 6370000000 HC RX 637 (ALT 250 FOR IP)

## 2022-04-06 PROCEDURE — 96372 THER/PROPH/DIAG INJ SC/IM: CPT

## 2022-04-06 PROCEDURE — 97116 GAIT TRAINING THERAPY: CPT

## 2022-04-06 PROCEDURE — 85025 COMPLETE CBC W/AUTO DIFF WBC: CPT

## 2022-04-06 PROCEDURE — 6370000000 HC RX 637 (ALT 250 FOR IP): Performed by: PHYSICIAN ASSISTANT

## 2022-04-06 PROCEDURE — 80053 COMPREHEN METABOLIC PANEL: CPT

## 2022-04-06 PROCEDURE — 97530 THERAPEUTIC ACTIVITIES: CPT

## 2022-04-06 PROCEDURE — 83735 ASSAY OF MAGNESIUM: CPT

## 2022-04-06 PROCEDURE — 6360000002 HC RX W HCPCS: Performed by: PHYSICIAN ASSISTANT

## 2022-04-06 PROCEDURE — 99217 PR OBSERVATION CARE DISCHARGE MANAGEMENT: CPT | Performed by: INTERNAL MEDICINE

## 2022-04-06 RX ORDER — HYDROCHLOROTHIAZIDE 25 MG/1
12.5 TABLET ORAL DAILY
Qty: 30 TABLET | Refills: 3 | Status: ON HOLD
Start: 2022-04-06 | End: 2022-06-04 | Stop reason: HOSPADM

## 2022-04-06 RX ORDER — ERGOCALCIFEROL 1.25 MG/1
50000 CAPSULE ORAL WEEKLY
Qty: 8 CAPSULE | Refills: 0 | Status: ON HOLD | OUTPATIENT
Start: 2022-04-07 | End: 2022-08-03 | Stop reason: SDUPTHER

## 2022-04-06 RX ORDER — BACITRACIN, NEOMYCIN, POLYMYXIN B 400; 3.5; 5 [USP'U]/G; MG/G; [USP'U]/G
OINTMENT TOPICAL
Status: COMPLETED
Start: 2022-04-06 | End: 2022-04-06

## 2022-04-06 RX ORDER — ASPIRIN 81 MG/1
81 TABLET, CHEWABLE ORAL DAILY
Qty: 30 TABLET | Refills: 3 | Status: SHIPPED | OUTPATIENT
Start: 2022-04-07

## 2022-04-06 RX ADMIN — CILOSTAZOL 100 MG: 100 TABLET ORAL at 08:18

## 2022-04-06 RX ADMIN — LEVOTHYROXINE SODIUM 50 MCG: 0.05 TABLET ORAL at 06:50

## 2022-04-06 RX ADMIN — PANTOPRAZOLE SODIUM 40 MG: 40 TABLET, DELAYED RELEASE ORAL at 06:51

## 2022-04-06 RX ADMIN — BACITRACIN ZINC, NEOMYCIN, POLYMYXIN B: 400; 3.5; 5 OINTMENT TOPICAL at 14:53

## 2022-04-06 RX ADMIN — TAMSULOSIN HYDROCHLORIDE 0.4 MG: 0.4 CAPSULE ORAL at 08:18

## 2022-04-06 RX ADMIN — FUROSEMIDE 20 MG: 20 TABLET ORAL at 08:17

## 2022-04-06 RX ADMIN — INSULIN LISPRO 1 UNITS: 100 INJECTION, SOLUTION INTRAVENOUS; SUBCUTANEOUS at 11:13

## 2022-04-06 RX ADMIN — ASPIRIN 81 MG 81 MG: 81 TABLET ORAL at 08:18

## 2022-04-06 RX ADMIN — ALOGLIPTIN 12.5 MG: 12.5 TABLET, FILM COATED ORAL at 08:18

## 2022-04-06 RX ADMIN — ENOXAPARIN SODIUM 40 MG: 40 INJECTION SUBCUTANEOUS at 08:17

## 2022-04-06 RX ADMIN — INSULIN LISPRO 1 UNITS: 100 INJECTION, SOLUTION INTRAVENOUS; SUBCUTANEOUS at 08:19

## 2022-04-06 RX ADMIN — ROSUVASTATIN CALCIUM 20 MG: 20 TABLET, COATED ORAL at 08:18

## 2022-04-06 RX ADMIN — LOSARTAN POTASSIUM 100 MG: 50 TABLET, FILM COATED ORAL at 08:17

## 2022-04-06 RX ADMIN — ERGOCALCIFEROL 50000 UNITS: 1.25 CAPSULE ORAL at 08:18

## 2022-04-06 RX ADMIN — METOPROLOL TARTRATE 25 MG: 25 TABLET, FILM COATED ORAL at 13:01

## 2022-04-06 RX ADMIN — AMLODIPINE BESYLATE 5 MG: 5 TABLET ORAL at 08:18

## 2022-04-06 NOTE — PLAN OF CARE
Problem: Falls - Risk of:  Goal: Will remain free from falls  Description: Will remain free from falls  4/6/2022 1340 by Hernán Dean RN  Outcome: Completed  4/6/2022 1046 by Hernán Dean RN  Outcome: Met This Shift  4/6/2022 0606 by Priscilla Olivo RN  Outcome: Ongoing  Goal: Absence of physical injury  Description: Absence of physical injury  Outcome: Completed     Problem: Skin Integrity:  Goal: Will show no infection signs and symptoms  Description: Will show no infection signs and symptoms  4/6/2022 1340 by Hernán Dean RN  Outcome: Completed  4/6/2022 0606 by Priscilla Olivo RN  Outcome: Ongoing  Goal: Absence of new skin breakdown  Description: Absence of new skin breakdown  Outcome: Completed     Problem: Musculor/Skeletal Functional Status  Goal: Highest potential functional level  4/6/2022 1340 by Hernán Dean RN  Outcome: Completed  4/6/2022 0606 by Priscilla Olivo RN  Outcome: Ongoing  Goal: Absence of falls  Outcome: Completed     Problem: Infection:  Goal: Will remain free from infection  Description: Will remain free from infection  Outcome: Completed     Problem: Safety:  Goal: Free from accidental physical injury  Description: Free from accidental physical injury  Outcome: Completed  Goal: Free from intentional harm  Description: Free from intentional harm  Outcome: Completed     Problem: Daily Care:  Goal: Daily care needs are met  Description: Daily care needs are met  Outcome: Completed     Problem: Pain:  Goal: Patient's pain/discomfort is manageable  Description: Patient's pain/discomfort is manageable  Outcome: Completed  Goal: Pain level will decrease  Description: Pain level will decrease  Outcome: Completed  Goal: Control of acute pain  Description: Control of acute pain  Outcome: Completed  Goal: Control of chronic pain  Description: Control of chronic pain  Outcome: Completed     Problem: Skin Integrity:  Goal: Skin integrity will stabilize  Description: Skin integrity will stabilize  Outcome: Completed     Problem: Discharge Planning:  Goal: Patients continuum of care needs are met  Description: Patients continuum of care needs are met  Outcome: Completed

## 2022-04-06 NOTE — FLOWSHEET NOTE
04/06/22 3710   Assessment   Charting Type Shift assessment   Neurological   Neuro (WDL) WDL   Level of Consciousness Alert (0)   Antonio Coma Scale   Eye Opening 4   Best Verbal Response 5   Best Motor Response 6   Antonio Coma Scale Score 15   HEENT   HEENT (WDL) X   Right Ear Impaired hearing   Left Ear Impaired hearing   Teeth Missing teeth;Dentures upper;Dentures lower   Respiratory   Respiratory (WDL) WDL   Cardiac   Cardiac (WDL) X   Cardiac Regularity Regular   Cardiac Rhythm Sinus rhythm;PVC   Cardiac Monitor   Telemetry Monitor On Yes   Telemetry Audible Yes   Telemetry Alarms Set Yes   Telemetry Box Number [id number]   Gastrointestinal   Abdominal (WDL) X   Abdomen Inspection Rounded   Peripheral Vascular   Peripheral Vascular (WDL) X   Edema Right lower extremity; Left lower extremity   RLE Edema Trace   LLE Edema Trace   RLE Neurovascular Assessment   Color Appropriate for ethnicity   R Pedal Pulse +1   LLE Neurovascular Assessment   Color Appropriate for ethnicity   L Pedal Pulse +1   Skin Color/Condition   Skin Color/Condition (WDL) X   Skin Color Pale   Skin Condition/Temp Dry; Warm   Skin Integrity   Skin Integrity (WDL) X   Skin Integrity Abrasion   Location rue   Preventative Dressing Yes   Assessed this shift? Yes   Musculoskeletal   Musculoskeletal (WDL) X   RUE Full movement   LUE Full movement   RL Extremity Weakness; Unsteady; Full movement   LL Extremity Weakness; Unsteady; Full movement   Genitourinary   Genitourinary (WDL) WDL   Wound 04/04/22 Brachial Anterior;Right Right upper arm Skin abrasion   Date First Assessed/Time First Assessed: 04/04/22 1540   Present on Hospital Admission: Yes  Primary Wound Type: Traumatic  Location: Brachial  Wound Location Orientation: Anterior;Right  Wound Description (Comments): Right upper arm Skin abrasion   Dressing Status Clean;Dry; Intact   Wound 04/04/22 Radial Right Right forearm Skin abrasion   Date First Assessed/Time First Assessed: 04/04/22 1540 Primary Wound Type: Traumatic  Location: Radial  Wound Location Orientation: Right  Wound Description (Comments): Right forearm Skin abrasion   Dressing Status Clean;Dry; Intact   Psychosocial   Psychosocial (WDL) WDL

## 2022-04-06 NOTE — PROGRESS NOTES
Physical Therapy  Facility/Department: [facility] MED SURG  Daily Treatment Note  NAME: Lori Echols  : [birth year]  MRN: [id number]    Date of Service: 2022    Discharge Recommendations:  Continue to assess pending progress        Assessment   Assessment: Pt requires SBA sit to stand and for transfers with RW. Ambulates 400' with RW and SBA. Improved safety noted with RW. Performs B LE ther ex in stand as indicated on care map. Pt indicates he is anticipating D/C to home later today. He would benefit from outpatient PT to improve strength and safety. Prognosis: Good        Patient Diagnosis(es): There were no encounter diagnoses. has a past medical history of Cataract, DM (diabetes mellitus) ([facility] [address] Erectile dysfunction, Hard of hearing, High cholesterol, Hypertension, Hypothyroidism, Peripheral vascular disease ([facility] [address] Pneumonia, and Stroke ([facility] [address] has a past surgical history that includes cyst removal; Foot surgery; and eye surgery (2012). Restrictions  Restrictions/Precautions  Restrictions/Precautions: Fall Risk  Required Braces or Orthoses?: No  Subjective              Orientation     Cognition      Objective   Bed mobility  Comment: Patient received sitting up in bedside chair. Transfers  Sit to Stand: Supervision  Stand to sit: Supervision  Bed to Chair: Supervision  Ambulation  Ambulation?: Yes  Ambulation 1  Surface: level tile  Device: Rolling Walker  Assistance: Stand by assistance  Distance: 400'  Comments:  Forward flexed posture     Balance  Posture: Good  Sitting - Static: Good  Sitting - Dynamic: Good  Standing - Static: Good  Standing - Dynamic: Good;-  Other exercises  Other exercises?: Yes (sit to stand, standing hip flexion and hip abd)                        G-Code     OutComes Score                                                     AM-PAC Score             Goals  Long term goals  Time Frame for Long term goals : 10 days  Long term goal 1: Patient will perform all bed mobility

## 2022-04-06 NOTE — DISCHARGE SUMMARY
Discharge Summary      Patient ID: Cristobal Mathias      Patient's PCP: COURT Jarvis CNP    Admit Date: 4/4/2022     Discharge Date:  4/6/2022    Admitting Provider: Gavi Mccoy MD    Discharging Provider: MORRIS Burton     Reason for this admission:   Generalized weakness  Falls    Discharge Diagnoses: Active Hospital Problems    Diagnosis Date Noted    Hypokalemia [E87.6] 04/05/2022    Speech problem [R47.9] 04/05/2022    Peripheral vascular disease ([facility] [address] [I73.9] 04/05/2022    EKG, abnormal [R94.31] 04/05/2022    History of stroke [Z86.73] 04/05/2022    Personal history of nicotine dependence [Z87.891] 04/05/2022    Generalized weakness [R53.1] 02/01/2022    Vitamin D deficiency [E55.9] 02/01/2022    CKD (chronic kidney disease) stage 3, GFR 30-59 ml/min ([facility] [address] [N18.30] 02/01/2022    Hypothyroidism [E03.9] 03/28/2011    Diabetes mellitus, type II ([facility] [address] [E11.9] 03/28/2011    Benign essential HTN [I10] 03/28/2011       Procedures:  CT HEAD WO CONTRAST   Final Result      1. Stable appearance of moderate diffuse cerebral atrophy and superimposed moderately advanced small vessel ischemic disease. Stable appearance of remote lacunar infarct in the left frontal corona radiata. No hemorrhage, mass, or recent infarct. ECHO 4/5 - final report pending - prelim report with EF 48%     Consults:   IP CONSULT TO DIETITIAN  IP CONSULT TO CASE MANAGEMENT  IP CONSULT TO HOME CARE NEEDS  PT  OT  SLP    Briefly:   Mr. Arvind Fox is an 41-year-old male with past medical history of peripheral vascular disease, stroke, nicotine dependence, CKD, hypothyroidism, diabetes, hypertension who was directly admitted from his PCP due to generalized weakness and falls. See details of hospital course below. He is stable for discharge home today.      Hospital Course:       Hypokalemia [E87.6]  - monitor and replace as needed  -Follow-up labs as outpatient in 1 week 04/05/2022    Speech problem [R47.9]  - concern for possible stroke with speech cifficulty over last few weeks. Patient states he had increasing difficulty with speech for last several weeks. -CT head obtained 4/5. Discussed with radiologist Dr. Adriana aSldivar and no changes noted from previous CT head in January. -SLP consulted no evidence of dysphagia   04/05/2022    Peripheral vascular disease ([facility] [address] [I73.9]  - continue pletal and statin. Asa added to regimen. 04/05/2022    EKG, abnormal [R94.31]  - EKG with low voltage qrs and few q waves noted and pvcs  - on telemetry patient tachycardic in 120s following activity  - obtained echo - final report pending   - TSH, mag, K ok   - start metoprolol 25 bid with tachycardia and frequent pvcs and follow up with pcp    04/05/2022    History of stroke [Z86.73]  - CT head January 2022 with old infarct in right thalamus   - Added ASA 81 mg daily and continue rosuvastatin   - patient with multiple falls and speech difficulty on exam - concern for stroke in last few months   - Repeat CT head obtained with evidence of old infarct in left frontal corona radiata. I called radiologist Dr. Adriana Saldivar on 4/5 and he compared CT head January to CT head 4/5 and confirms no evidence of new stroke. No change from previous CT head imaging despite differing radiology reports. - notified patient and wife that there is evidence of previous strokes and aspirin added to regimen    04/05/2022    Generalized weakness [R53.1]  - patient directly admitted due to generalized weakness, falls   - on admit mild hypokalemia and hyponatremia noted. Cr  1.3 (suspected baseline 1.2-1.3). Vit d deficient. Concern for stroke over last few weeks due to speech changes, falls. - lasix and hctz held on arrival   - CT head and per radiology no change from Jan 2022 CT head   - pt/ot consulted and cleared for dc home.  Patient ambulating with rolling walker.   - SLP consulted and no evidence of dysphagia   - fall precautions    02/01/2022    Vitamin D deficiency [E55.9]  - start vit d   02/01/2022    CKD (chronic kidney disease) stage 3, GFR 30-59 ml/min ([facility]) [N18.30]  - Cr 1.3 on arrival (suspected baseline 1.2-1.3)  - continue to monitor   - follow up labs ordered as outpatient    02/01/2022    Hypothyroidism [E03.9]  - continue synthroid  - tsh wnl   03/28/2011    Diabetes mellitus, [facility] II [facility] [E11.9]  - A1C 7.9   - monitor fsbg achs and cover with low dose ssi as needed during hospitla course   - increase home januvia to 100 mg daily (CrCl currently 49 ml/min)   - f/u with pcp   - encourage diabetic diet    03/28/2011    Benign essential HTN [I10  - hctz and lasix on hold on admit with generalized weakness and electrolyte abnormalities   - resume lasix 4/5   - plan to resume hctz at decreased dose 12.5 mg   - stop amlodipine since starting metoprolol   - f/u with pcp for ongoing monitoring and medication adjustment    03/28/2011   · Personal history of nicotine dependence   - Patient was strongly encouraged to stop smoking. Risks of continued smoking and benefits of smoking cessation were discussed. - nicotine patch ordered        Vital Signs  Temp: 97.7 °F (36.5 °C)  Pulse: 100  Resp: 18  BP: 115/64  SpO2: 99 %  O2 Device: None (Room air)       Vital signs reviewed in electronic chart. Physical exam  Constitutional:  Well developed, well nourished, no acute distress  Eyes:  PERRL, no scleral icterus, conjunctiva normal   HENT:  Atraumatic, external ears normal, nose normal, oropharynx moist, no pharyngeal exudates. Neck- supple, no JVD, no lymphadenopathy  Respiratory:  No respiratory distress, no wheezing, rales or rhonchi detected  Cardiovascular:  Normal rate, normal rhythm, no murmurs, no gallops, no rubs, no edema   GI:  Soft, nondistended, normal bowel sounds, nontender, no voluntary guarding  Musculoskeletal:  No cyanosis or obvious acute deformity. Moving all extremities   Integument:  Warm and dry.  Multiple bruises and abrasions to right arm. Neurologic:  Alert & oriented x 3, no apparent focal deficits noted,  strength equal bilaterally, speech difficulty noted     Psychiatric:  Speech and behavior appropriate         Disposition: home with family assistance and home health     Discharged Condition: Stable    Activity: activity as tolerated with rolling walker   Diet: diabetic diet  Follow Up: Primary Care Provider in 1 week  Patient to proceed with the following lab (cbc, cmp) in 1 week    Labs:  For convenience and continuity at follow-up the following most recent labs are provided:    CBC:   Lab Results   Component Value Date    WBC 6.6 04/06/2022    HGB 12.2 04/06/2022    HCT 38.2 04/06/2022     04/06/2022       RENAL:   Lab Results   Component Value Date     04/06/2022    K 3.5 04/06/2022    K 3.2 03/11/2022     04/06/2022    CO2 22 04/06/2022    BUN 19 04/06/2022    CREATININE 1.2 04/06/2022         Discharge Medications:      Current Discharge Medication List           Details   aspirin 81 MG chewable tablet Take 1 tablet by mouth daily  Qty: 30 tablet, Refills: 3      metoprolol tartrate (LOPRESSOR) 25 MG tablet Take 1 tablet by mouth 2 times daily  Qty: 60 tablet, Refills: 3      vitamin D (ERGOCALCIFEROL) 1.25 MG (17355 UT) CAPS capsule Take 1 capsule by mouth once a week  Qty: 8 capsule, Refills: 0              Details   hydroCHLOROthiazide (HYDRODIURIL) 25 MG tablet Take 0.5 tablets by mouth daily  Qty: 30 tablet, Refills: 3      SITagliptin (JANUVIA) 50 MG tablet Take 2 tablets by mouth daily  Qty: 60 tablet, Refills: 0              Details   losartan (COZAAR) 100 MG tablet Take 100 mg by mouth daily      furosemide (LASIX) 20 MG tablet Take 20 mg by mouth daily      albuterol sulfate  (90 Base) MCG/ACT inhaler Inhale 2 puffs into the lungs every 6 hours as needed for Wheezing  Qty: 18 g, Refills: 3      tamsulosin (FLOMAX) 0.4 MG capsule Take 1 capsule by mouth daily  Qty: 30 capsule, Refills: 3 levothyroxine (SYNTHROID) 50 MCG tablet take 1 tablet by mouth once daily  Qty: 30 tablet, Refills: 1      rosuvastatin (CRESTOR) 20 MG tablet Take 20 mg by mouth daily      omeprazole (PRILOSEC) 20 MG delayed release capsule Take 20 mg by mouth daily      cilostazol (PLETAL) 100 MG tablet Take 1 tablet by mouth 2 times daily. Qty: 60 tablet, Refills: 6            Patient was seen and examined with Dr. Nan Davis. After reviewing patient data and diagnostic testing the plan of care was established in conjunction with Dr. Nan Davis. Signed:  Electronically signed by MORRIS Crain on 4/6/2022 at 1:26 PM       Thank you COURT Centeno CNP for the opportunity to be involved in this patient's care. If you have any questions or concerns please feel free to contact me at [phone].

## 2022-04-06 NOTE — PLAN OF CARE
Problem: Falls - Risk of:  Goal: Will remain free from falls  Description: Will remain free from falls  4/6/2022 1046 by Jnaia Andrea RN  Outcome: Met This Shift  4/6/2022 0606 by Manny Clements RN  Outcome: Ongoing

## 2022-04-06 NOTE — CARE COORDINATION
The Plan for Transition of Care is related to the following treatment goals: home with [place] and wheels for walker    The Patient and/or patient representative was provided with a choice of provider and agrees with the discharge plan. [x] Yes [] No    Freedom of choice list was provided with basic dialogue that supports the patient's individualized plan of care/goals, treatment preferences and shares the quality data associated with the providers. [x] Yes [] No    Pt has a standard walker and insurance will only cover wheels for it at this time. Pt will need to  wheels at [facility]. HH orders sent to [facility] as they are one of the only companies to cover [place]. Verified that Leonel Dee from [place] received. Cady from [facility] received orders.

## 2022-04-06 NOTE — PROGRESS NOTES
Occupational Therapy  Daily Note  Date: 2022   Patient Name: Betty Montero  MRN: [id number]     : 1940    Date of Service: 2022  Discharge Recommendations:  Continue to assess pending progress    Assessment  Pt seen for skilled OT interventions; no c/o pain. OT provided pt education about symptoms of CVA to be observant of if occurring in future due to increase risk for reoccurrance. OT provided pt education about diet management to encourage healthy meal consumption. OT provided pt education about EC techniques which can be used during daily routine due to reported generalized weakness; pt expressed understanding. Pt refused participation in ADL training stating he was being d/c today. Pt in bedside chair, all needs met. Patient Diagnosis(es): The encounter diagnosis was History of stroke.   has a past medical history of Cataract, DM (diabetes mellitus) ([facility] [address] Erectile dysfunction, Hard of hearing, High cholesterol, Hypertension, Hypothyroidism, Peripheral vascular disease ([place] [address] Pneumonia, and Stroke ([facility] [address] has a past surgical history that includes cyst removal; Foot surgery; and eye surgery (2012). Restrictions/Precautions: Fall Risk    Subjective  Chart Reviewed: Yes  Patient assessed for rehabilitation services?: Yes  Family / Caregiver Present: No  Referring Practitioner: MORRIS Keen  Diagnosis: Generalized weakness  Subjective: Pt agreeable to OT services. Pt states he has had several falls recently at home and has been weak    Objective  NAD. RA. Pleasant & cooperative. Reports d/c plans for today.      Plan  Times per week: 3-5  Times per day: Daily  Plan weeks: 1  Current Treatment Recommendations: Strengthening,Neuromuscular Re-education,Balance Training,Functional Mobility Training,Self-Care / ADL,Equipment Evaluation, Education, & procurement,Patient/Caregiver Education & Training    Goals  Short term goals  Time Frame for Short term goals: 1 week  Short term goal 1: Pt to complete dressing with MOD I. Short term goal 2: Pt to complete bathing with MOD I. Short term goal 3: Pt to complete toileting with MOD I. Short term goal 4: Pt to complete shower transfer with MOD I. Short term goal 5: Pt to tolerate x20 minutes of activity to increase functional activity tolerance. Therapy Time   Individual   Time In 1030   Time Out 1049   Minutes 19     This note serves as a DC summary in the event of pt discharge.    Scarlett Felix OTR/L

## 2022-04-06 NOTE — PROGRESS NOTES
Tele and PIV removed. Dressings changed to skin tears on rt arm. Discharged home with wife. Wife verbalized understanding of discharge instructions and is aware to  new prescriptions at pharmacy. Accompanied to exit.

## 2022-04-07 LAB
EKG ATRIAL RATE: 93 BPM
EKG DIAGNOSIS: NORMAL
EKG P AXIS: 28 DEGREES
EKG P-R INTERVAL: 176 MS
EKG Q-T INTERVAL: 344 MS
EKG QRS DURATION: 80 MS
EKG QTC CALCULATION (BAZETT): 427 MS
EKG R AXIS: -41 DEGREES
EKG T AXIS: 64 DEGREES
EKG VENTRICULAR RATE: 93 BPM

## 2022-04-18 ENCOUNTER — APPOINTMENT (OUTPATIENT)
Dept: GENERAL RADIOLOGY | Facility: HOSPITAL | Age: 82
End: 2022-04-18
Payer: MEDICARE

## 2022-04-18 ENCOUNTER — HOSPITAL ENCOUNTER (EMERGENCY)
Facility: HOSPITAL | Age: 82
Discharge: HOME | End: 2022-04-18
Attending: EMERGENCY MEDICINE
Payer: MEDICARE

## 2022-04-18 VITALS
DIASTOLIC BLOOD PRESSURE: 86 MMHG | RESPIRATION RATE: 18 BRPM | BODY MASS INDEX: 24.91 KG/M2 | WEIGHT: 174 LBS | SYSTOLIC BLOOD PRESSURE: 145 MMHG | OXYGEN SATURATION: 100 % | HEART RATE: 86 BPM | TEMPERATURE: 97.6 F | HEIGHT: 70 IN

## 2022-04-18 DIAGNOSIS — S22.42XA CLOSED FRACTURE OF MULTIPLE RIBS OF LEFT SIDE, INITIAL ENCOUNTER: Primary | ICD-10-CM

## 2022-04-18 LAB
A/G RATIO: 1 (ref 0.8–2)
ALBUMIN SERPL-MCNC: 3.4 G/DL (ref 3.4–4.8)
ALP BLD-CCNC: 81 U/L (ref 25–100)
ALT SERPL-CCNC: 8 U/L (ref 4–36)
AMMONIA: 20 MCG/DL (ref 27–102)
ANION GAP SERPL CALCULATED.3IONS-SCNC: 15 MMOL/L (ref 3–16)
AST SERPL-CCNC: 11 U/L (ref 8–33)
BASOPHILS ABSOLUTE: 0.1 K/UL (ref 0–0.1)
BASOPHILS RELATIVE PERCENT: 0.6 %
BILIRUB SERPL-MCNC: 0.8 MG/DL (ref 0.3–1.2)
BILIRUBIN URINE: NEGATIVE
BLOOD, URINE: NEGATIVE
BUN BLDV-MCNC: 25 MG/DL (ref 6–20)
CALCIUM SERPL-MCNC: 10.2 MG/DL (ref 8.5–10.5)
CHLORIDE BLD-SCNC: 101 MMOL/L (ref 98–107)
CLARITY: CLEAR
CO2: 22 MMOL/L (ref 20–30)
COLOR: YELLOW
CREAT SERPL-MCNC: 1.5 MG/DL (ref 0.4–1.2)
EOSINOPHILS ABSOLUTE: 0.1 K/UL (ref 0–0.4)
EOSINOPHILS RELATIVE PERCENT: 1.1 %
EPITHELIAL CELLS, UA: ABNORMAL /HPF (ref 0–5)
FINE CASTS, UA: ABNORMAL /LPF (ref 0–2)
GFR AFRICAN AMERICAN: 54
GFR NON-AFRICAN AMERICAN: 45
GLOBULIN: 3.4 G/DL
GLUCOSE BLD-MCNC: 174 MG/DL (ref 74–106)
GLUCOSE URINE: NEGATIVE MG/DL
HCT VFR BLD CALC: 40.7 % (ref 40–54)
HEMOGLOBIN: 13.3 G/DL (ref 13–18)
IMMATURE GRANULOCYTES #: 0.1 K/UL
IMMATURE GRANULOCYTES %: 0.7 % (ref 0–5)
KETONES, URINE: NEGATIVE MG/DL
LEUKOCYTE ESTERASE, URINE: NEGATIVE
LYMPHOCYTES ABSOLUTE: 1.9 K/UL (ref 1.5–4)
LYMPHOCYTES RELATIVE PERCENT: 18.8 %
MCH RBC QN AUTO: 29.8 PG (ref 27–32)
MCHC RBC AUTO-ENTMCNC: 32.7 G/DL (ref 31–35)
MCV RBC AUTO: 91.3 FL (ref 80–100)
MICROSCOPIC EXAMINATION: YES
MONOCYTES ABSOLUTE: 0.7 K/UL (ref 0.2–0.8)
MONOCYTES RELATIVE PERCENT: 7.2 %
NEUTROPHILS ABSOLUTE: 7.2 K/UL (ref 2–7.5)
NEUTROPHILS RELATIVE PERCENT: 71.6 %
NITRITE, URINE: NEGATIVE
PDW BLD-RTO: 15.1 % (ref 11–16)
PH UA: 6 (ref 5–8)
PLATELET # BLD: 254 K/UL (ref 150–400)
PMV BLD AUTO: 9.7 FL (ref 6–10)
POTASSIUM REFLEX MAGNESIUM: 3.9 MMOL/L (ref 3.4–5.1)
PROTEIN UA: 30 MG/DL
RBC # BLD: 4.46 M/UL (ref 4.5–6)
RBC UA: ABNORMAL /HPF (ref 0–4)
SODIUM BLD-SCNC: 138 MMOL/L (ref 136–145)
SPECIFIC GRAVITY UA: 1.01 (ref 1–1.03)
TOTAL PROTEIN: 6.8 G/DL (ref 6.4–8.3)
TROPONIN: <0.3 NG/ML
URINE REFLEX TO CULTURE: ABNORMAL
URINE TYPE: ABNORMAL
UROBILINOGEN, URINE: 2 E.U./DL
WBC # BLD: 10 K/UL (ref 4–11)
WBC UA: ABNORMAL /HPF (ref 0–5)

## 2022-04-18 PROCEDURE — 80053 COMPREHEN METABOLIC PANEL: CPT

## 2022-04-18 PROCEDURE — 85025 COMPLETE CBC W/AUTO DIFF WBC: CPT

## 2022-04-18 PROCEDURE — 71101 X-RAY EXAM UNILAT RIBS/CHEST: CPT

## 2022-04-18 PROCEDURE — 6370000000 HC RX 637 (ALT 250 FOR IP): Performed by: EMERGENCY MEDICINE

## 2022-04-18 PROCEDURE — 93005 ELECTROCARDIOGRAM TRACING: CPT

## 2022-04-18 PROCEDURE — 99283 EMERGENCY DEPT VISIT LOW MDM: CPT

## 2022-04-18 PROCEDURE — 81001 URINALYSIS AUTO W/SCOPE: CPT

## 2022-04-18 PROCEDURE — 84484 ASSAY OF TROPONIN QUANT: CPT

## 2022-04-18 PROCEDURE — 36415 COLL VENOUS BLD VENIPUNCTURE: CPT

## 2022-04-18 PROCEDURE — 99284 EMERGENCY DEPT VISIT MOD MDM: CPT

## 2022-04-18 PROCEDURE — 82140 ASSAY OF AMMONIA: CPT

## 2022-04-18 RX ORDER — HYDROCODONE BITARTRATE AND ACETAMINOPHEN 5; 325 MG/1; MG/1
1 TABLET ORAL EVERY 6 HOURS PRN
Qty: 8 TABLET | Refills: 0 | Status: SHIPPED | OUTPATIENT
Start: 2022-04-18 | End: 2022-04-20

## 2022-04-18 RX ORDER — HYDROCODONE BITARTRATE AND ACETAMINOPHEN 5; 325 MG/1; MG/1
1 TABLET ORAL ONCE
Status: COMPLETED | OUTPATIENT
Start: 2022-04-18 | End: 2022-04-18

## 2022-04-18 RX ADMIN — HYDROCODONE BITARTRATE AND ACETAMINOPHEN 1 TABLET: 5; 325 TABLET ORAL at 12:28

## 2022-04-18 ASSESSMENT — PAIN DESCRIPTION - FREQUENCY: FREQUENCY: CONTINUOUS

## 2022-04-18 ASSESSMENT — PAIN DESCRIPTION - LOCATION
LOCATION: RIB CAGE
LOCATION: RIB CAGE

## 2022-04-18 ASSESSMENT — PAIN SCALES - GENERAL
PAINLEVEL_OUTOF10: 9
PAINLEVEL_OUTOF10: 3
PAINLEVEL_OUTOF10: 9

## 2022-04-18 ASSESSMENT — PAIN DESCRIPTION - DESCRIPTORS: DESCRIPTORS: ACHING

## 2022-04-18 ASSESSMENT — PAIN DESCRIPTION - PAIN TYPE
TYPE: ACUTE PAIN
TYPE: ACUTE PAIN

## 2022-04-18 ASSESSMENT — PAIN DESCRIPTION - ORIENTATION
ORIENTATION: LEFT
ORIENTATION: LEFT

## 2022-04-18 ASSESSMENT — PAIN - FUNCTIONAL ASSESSMENT: PAIN_FUNCTIONAL_ASSESSMENT: 0-10

## 2022-04-18 NOTE — ED NOTES
Urinal provided. Pt will attempt to provide a urine sample at this time.      Selin Suarez RN  04/18/22 2535

## 2022-04-18 NOTE — ED NOTES
Unable to obtain labs after 2 attempts.   I will request assistance     Hamzah Soto RN  04/18/22 7162

## 2022-04-18 NOTE — ED PROVIDER NOTES
[facility] ENCOUNTER      Pt Name: Kt Myers  MRN: [id number]  YOB: 1940  Date of evaluation: 4/18/2022  Provider: Telly Cochran, [address] [place] [address]       Chief Complaint   Patient presents with    Rib Pain     left side         HISTORY OF PRESENT ILLNESS  (Location/Symptom, Timing/Onset, Context/Setting, Quality, Duration, Modifying Factors, Severity.)   Kt Myers is a 81 y.o. male who presents to the emergency department a chief complaint of left-sided rib pain. Pain is minimal at rest but severe with movement. Patient states he has had previous strokes and because of this he has poor balance and falls frequently. Patient denies loss of consciousness. Patient denies headache. Nursing notes were reviewed. REVIEW OFSYSTEMS    (2-9 systems for level 4, 10 or more for level 5)   ROS:  General:  No fevers, no chills, no weakness  Cardiovascular:  No chest pain, no palpitations  Respiratory:  No shortness of breath, no cough, no wheezing  Gastrointestinal:  No pain, no nausea, no vomiting, no diarrhea  Musculoskeletal:  No muscle pain, no joint pain left-sided rib pain  Skin:  No rash, no easy bruising  Neurologic:  No speech problems, no headache, no extremity weakness, +chronic difficulty walking  Psychiatric:  No anxiety  Genitourinary:  No dysuria, no hematuria    Except as noted above the remainder of the review of systems was reviewed and negative.        PAST MEDICAL HISTORY     Past Medical History:   Diagnosis Date    Cataract     DM (diabetes mellitus) ([address]     Erectile dysfunction     Hard of hearing     hearing aid    High cholesterol     Hypertension     Hypothyroidism     Peripheral vascular disease ([address]     Pneumonia     Stroke [facility])          SURGICAL HISTORY       Past Surgical History:   Procedure Laterality Date    CYST REMOVAL      under right arm     EYE SURGERY  01/2012    cataract    FOOT SURGERY      growth removed; left         CURRENT MEDICATIONS       Previous Medications    ASPIRIN 81 MG CHEWABLE TABLET    Take 1 tablet by mouth daily    CILOSTAZOL (PLETAL) 100 MG TABLET    Take 1 tablet by mouth 2 times daily. FUROSEMIDE (LASIX) 20 MG TABLET    Take 20 mg by mouth daily    HYDROCHLOROTHIAZIDE (HYDRODIURIL) 25 MG TABLET    Take 0.5 tablets by mouth daily    LEVOTHYROXINE (SYNTHROID) 50 MCG TABLET    take 1 tablet by mouth once daily    LOSARTAN (COZAAR) 100 MG TABLET    Take 100 mg by mouth daily    METOPROLOL TARTRATE (LOPRESSOR) 25 MG TABLET    Take 1 tablet by mouth 2 times daily    OMEPRAZOLE (PRILOSEC) 20 MG DELAYED RELEASE CAPSULE    Take 20 mg by mouth daily    ROSUVASTATIN (CRESTOR) 20 MG TABLET    Take 20 mg by mouth daily    SITAGLIPTIN (JANUVIA) 50 MG TABLET    Take 2 tablets by mouth daily    TAMSULOSIN (FLOMAX) 0.4 MG CAPSULE    Take 1 capsule by mouth daily    VITAMIN D (ERGOCALCIFEROL) 1.25 MG (03631 UT) CAPS CAPSULE    Take 1 capsule by mouth once a week       ALLERGIES     Patient has no known allergies.     FAMILY HISTORY       Family History   Problem Relation Age of Onset    Stroke Mother     Heart Disease Daughter         MI    Stroke Daughter           SOCIAL HISTORY       Social History     Socioeconomic History    Marital status:      Spouse name: None    Number of children: None    Years of education: None    Highest education level: None   Occupational History    None   Tobacco Use    Smoking status: Current Every Day Smoker     Packs/day: 1.00     Years: 53.00     Pack years: 53.00     Types: Cigarettes    Smokeless tobacco: Never Used   Substance and Sexual Activity    Alcohol use: No    Drug use: No    Sexual activity: None   Other Topics Concern    None   Social History Narrative    None     Social Determinants of Health     Financial Resource Strain:     Difficulty of Paying Living Expenses: Not on file   Food Insecurity:     Worried About Running Out of Food in the Last Year: Not on file    Ran Out of Food in the Last Year: Not on file   Transportation Needs:     Lack of Transportation (Medical): Not on file    Lack of Transportation (Non-Medical): Not on file   Physical Activity:     Days of Exercise per Week: Not on file    Minutes of Exercise per Session: Not on file   Stress:     Feeling of Stress : Not on file   Social Connections:     Frequency of Communication with Friends and Family: Not on file    Frequency of Social Gatherings with Friends and Family: Not on file    Attends Mandaeism Services: Not on file    Active Member of [address] [place] [address] [employer] or Organizations: Not on file    Attends Club or Organization Meetings: Not on file    Marital Status: Not on file   Intimate Partner Violence:     Fear of Current or Ex-Partner: Not on file    Emotionally Abused: Not on file    Physically Abused: Not on file    Sexually Abused: Not on file   Housing Stability:     Unable to Pay for Housing in the Last Year: Not on file    Number of [place] in the Last Year: Not on file    Unstable Housing in the Last Year: Not on file         PHYSICAL EXAM    (up to 7 for level 4, 8 or more for level 5)     ED Triage Vitals   BP Temp Temp Source Pulse Resp SpO2 Height Weight   04/18/22 1158 04/18/22 1151 04/18/22 1151 04/18/22 1151 04/18/22 1151 04/18/22 1151 04/18/22 1151 04/18/22 1151   (!) 120/54 97.6 °F (36.4 °C) Oral 77 19 97 % 5' 10\" (1.778 m) 174 lb (78.9 kg)       Physical Exam  General :Patient is awake, alert, oriented, in no acute distress, nontoxic appearing  HEENT: Pupils are equally round and reactive to light, EOMI, conjunctivae clear. Oral mucosa is moist, no exudate. Uvula is midline  Neck: Neck is supple, full range of motion, trachea midline  Cardiac: Heart regular rate, rhythm, no murmurs, rubs, or gallops  Lungs: Lungs are clear to auscultation, there is no wheezing, rhonchi, or rales.  There is no use of accessory muscles. Chest wall: There is mild tenderness to palpation over the left anterior lower ribs  Abdomen: Abdomen is soft, nontender, nondistended. There is no firm or pulsatile masses, no rebound rigidity or guarding. Musculoskeletal: 5 out of 5 strength in all 4 extremities. No focal muscle deficits are appreciated  Neuro: Motor intact, sensory intact, level of consciousness is normal.  Dermatology: Skin is warm and dry  Psych: Mentation is grossly normal, cognition is grossly normal. Affect is appropriate. DIAGNOSTIC RESULTS     EKG: All EKG's are interpreted by the Emergency Department Physician who either signs or Co-signs this chart in the [address] a cardiologist.    The EKG interpreted by me shows normal sinus rhythm rate of 69 normal PA interval normal QT interval normal axis frequent PVCs no ST segment changes consistent with acute myocardial infarction no T wave changes consistent with acute myocardial ischemia. RADIOLOGY:   Non-plain film images such as CT, Ultrasound and MRI are read by the radiologist. Plain radiographic images are visualized and preliminarily interpreted by the emergency physician with the below findings:      ? Radiologist's Report Reviewed:  XR RIBS LEFT INCLUDE CHEST (MIN 3 VIEWS)   Final Result   Impression: Fractures of the left fifth and sixth ribs posterior laterally without significant displacement. Subcutaneous emphysema of the left chest wall and axilla is noted. No discrete pneumothorax.             ED BEDSIDE ULTRASOUND:   Performed by ED Physician - none    LABS:    I have reviewed and interpreted all of the currently available lab results from this visit (ifapplicable):  Results for orders placed or performed during the hospital encounter of 04/18/22   CBC with Auto Differential   Result Value Ref Range    WBC 10.0 4.0 - 11.0 K/uL    RBC 4.46 (L) 4.50 - 6.00 M/uL    Hemoglobin 13.3 13.0 - 18.0 g/dL    Hematocrit 40.7 40.0 - 54.0 %    MCV 91.3 80.0 - 100.0 fL MCH 29.8 27.0 - 32.0 pg    MCHC 32.7 31.0 - 35.0 g/dL    RDW 15.1 11.0 - 16.0 %    Platelets 782 319 - 291 K/uL    MPV 9.7 6.0 - 10.0 fL    Neutrophils % 71.6 %    Immature Granulocytes % 0.7 0.0 - 5.0 %    Lymphocytes % 18.8 %    Monocytes % 7.2 %    Eosinophils % 1.1 %    Basophils % 0.6 %    Neutrophils Absolute 7.2 2.0 - 7.5 K/uL    Immature Granulocytes # 0.1 K/uL    Lymphocytes Absolute 1.9 1.5 - 4.0 K/uL    Monocytes Absolute 0.7 0.2 - 0.8 K/uL    Eosinophils Absolute 0.1 0.0 - 0.4 K/uL    Basophils Absolute 0.1 0.0 - 0.1 K/uL   Comprehensive Metabolic Panel w/ Reflex to MG   Result Value Ref Range    Sodium 138 136 - 145 mmol/L    Potassium reflex Magnesium 3.9 3.4 - 5.1 mmol/L    Chloride 101 98 - 107 mmol/L    CO2 22 20 - 30 mmol/L    Anion Gap 15 3 - 16    Glucose 174 (H) 74 - 106 mg/dL    BUN 25 (H) 6 - 20 mg/dL    CREATININE 1.5 (H) 0.4 - 1.2 mg/dL    GFR Non-African American 45 (L) >59    GFR  54 (L) >59    Calcium 10.2 8.5 - 10.5 mg/dL    Total Protein 6.8 6.4 - 8.3 g/dL    Albumin 3.4 3.4 - 4.8 g/dL    Albumin/Globulin Ratio 1.0 0.8 - 2.0    Total Bilirubin 0.8 0.3 - 1.2 mg/dL    Alkaline Phosphatase 81 25 - 100 U/L    ALT 8 4 - 36 U/L    AST 11 8 - 33 U/L    Globulin 3.4 Not Established g/dL   Troponin   Result Value Ref Range    Troponin <0.30 <0.30 ng/mL   Ammonia   Result Value Ref Range    Ammonia 20 (L) 27 - 102 mcg/dL   Urinalysis with Reflex to Culture    Specimen: Urine   Result Value Ref Range    Color, UA Yellow Straw/Yellow    Clarity, UA Clear Clear    Glucose, Ur Negative Negative mg/dL    Bilirubin Urine Negative Negative    Ketones, Urine Negative Negative mg/dL    Specific Gravity, UA 1.015 1.005 - 1.030    Blood, Urine Negative Negative    pH, UA 6.0 5.0 - 8.0    Protein, UA 30 (A) Negative mg/dL    Urobilinogen, Urine 2.0 (A) <2.0 E.U./dL    Nitrite, Urine Negative Negative    Leukocyte Esterase, Urine Negative Negative    Microscopic Examination YES     Urine Type Voided     Urine Reflex to Culture Not Indicated    Microscopic Urinalysis   Result Value Ref Range    Fine Casts, UA 3-5 (A) 0 - 2 /LPF    WBC, UA 3-5 0 - 5 /HPF    RBC, UA 3-4 0 - 4 /HPF    Epithelial Cells, UA 0-1 0 - 5 /HPF        All other labs were within normal range or not returned as of this dictation. EMERGENCY DEPARTMENT COURSE and DIFFERENTIAL DIAGNOSIS/MDM:   Vitals:    Vitals:    04/18/22 1258 04/18/22 1313 04/18/22 1328 04/18/22 1343   BP: (!) 150/81 128/66 132/66 128/68   Pulse: 87 87 85    Resp: 24 20 23    Temp:       TempSrc:       SpO2:  93% 95%    Weight:       Height:           MEDICATIONS ADMINISTERED IN ED:  Medications   HYDROcodone-acetaminophen (NORCO) 5-325 MG per tablet 1 tablet (1 tablet Oral Given 4/18/22 1228)     Patient's prescribing history was reviewed. It revealed no medications. Will prescribe Norco 5 1 every 6 hours as needed for pain total of 8 tablets. Discussed risks of narcotics with patient including increased risk for falls. Discussed the risk of constipation and addiction as well. Patient's laboratory studies were and EKG were reviewed these were unremarkable. Patient's chest x-ray showed nondisplaced fractures of ribs 5 and rib 6 on the left. No pneumothorax seen on chest x-ray. Patient denies pain and shortness of breath at rest he states he has quite a bit of pain with movement. States that this pain limits his ability to do his activities of daily living. Will prescribe the Norco for couple of days to help patient with the initial pain associated with these fractures. The patient will follow-up with their PCP in 1-2 days for reevaluation. If the patient or family members have anyfurther concerns or any worsening symptoms they will return to the ED for reevaluation. CONSULTS:  None    PROCEDURES:  Procedures    CRITICAL CARE TIME    Total Critical Care time was 0 minutes, excluding separately reportable procedures.    There was a high probability of clinically significant/life threatening deterioration in the patient's condition which required my urgent intervention. FINAL IMPRESSION      1. Closed fracture of multiple ribs of left side, initial encounter          DISPOSITION/PLAN   DISPOSITION        PATIENT REFERRED TO:  [facility] - [address]  [phone]    In 2 days      [facility] Emergency Department  [address] [facility]  [phone]    As needed, If symptoms worsen      DISCHARGE MEDICATIONS:  New Prescriptions    No medications on file       Comment: Please note this report has been produced using speech recognition software and may contain errorsrelated to that system including errors in grammar, punctuation, and spelling, as well as words and phrases that may be inappropriate. If there are any questions or concerns please feel free to contact the dictating providerfor clarification.     Bibiana Wynn,   Attending Emergency Physician              Bibiana Wynn, DO  04/18/22 1196 [facility], DO  04/18/22 9664

## 2022-04-18 NOTE — ED NOTES
Reviewed discharge plan with Belkys Marin and his wife. Encouraged him to f/u with COURT Bob CNP and he understood. NAD noted on discharge. Reviewed discharge prescription for:     Current Discharge Medication List      START taking these medications    Details   HYDROcodone-acetaminophen (NORCO) 5-325 MG per tablet Take 1 tablet by mouth every 6 hours as needed for Pain for up to 2 days. Intended supply: 3 days. Take lowest dose possible to manage pain  Qty: 8 tablet, Refills: 0    Associated Diagnoses: Closed fracture of multiple ribs of left side, initial encounter             Sadia's wife states understanding of how and when to take medications.       Electronically signed by Beatriz Louis RN on 4/18/2022 at 3:30 PM     Beatriz Louis RN  04/18/22 3686

## 2022-04-18 NOTE — ED TRIAGE NOTES
Per pt and his wife, pt fell about 2 or 3 days ago and hit his left side on the cabinet. He c/o left sided rib pain. He also stated that he fell this am but has no injury from this fall. He denied loc either time.

## 2022-05-10 ENCOUNTER — HOSPITAL ENCOUNTER (OUTPATIENT)
Facility: HOSPITAL | Age: 82
Discharge: HOME | End: 2022-05-10
Payer: MEDICARE

## 2022-05-10 ENCOUNTER — OFFICE VISIT (OUTPATIENT)
Dept: PRIMARY CARE CLINIC | Age: 82
End: 2022-05-10
Payer: MEDICARE

## 2022-05-10 VITALS
TEMPERATURE: 98 F | OXYGEN SATURATION: 99 % | BODY MASS INDEX: 24.91 KG/M2 | HEART RATE: 66 BPM | SYSTOLIC BLOOD PRESSURE: 100 MMHG | DIASTOLIC BLOOD PRESSURE: 60 MMHG | RESPIRATION RATE: 18 BRPM | HEIGHT: 70 IN | WEIGHT: 174 LBS

## 2022-05-10 DIAGNOSIS — R29.6 FREQUENT FALLS: ICD-10-CM

## 2022-05-10 DIAGNOSIS — E78.00 PURE HYPERCHOLESTEROLEMIA: ICD-10-CM

## 2022-05-10 DIAGNOSIS — N18.30 TYPE 2 DIABETES MELLITUS WITH STAGE 3 CHRONIC KIDNEY DISEASE, UNSPECIFIED WHETHER LONG TERM INSULIN USE, UNSPECIFIED WHETHER STAGE 3A OR 3B CKD (HCC): Primary | ICD-10-CM

## 2022-05-10 DIAGNOSIS — E55.9 VITAMIN D DEFICIENCY: ICD-10-CM

## 2022-05-10 DIAGNOSIS — E11.22 TYPE 2 DIABETES MELLITUS WITH STAGE 3 CHRONIC KIDNEY DISEASE, UNSPECIFIED WHETHER LONG TERM INSULIN USE, UNSPECIFIED WHETHER STAGE 3A OR 3B CKD (HCC): Primary | ICD-10-CM

## 2022-05-10 DIAGNOSIS — Z86.73 HISTORY OF STROKE: ICD-10-CM

## 2022-05-10 DIAGNOSIS — R53.1 WEAKNESS: ICD-10-CM

## 2022-05-10 DIAGNOSIS — Z72.0 TOBACCO ABUSE: ICD-10-CM

## 2022-05-10 DIAGNOSIS — Z91.81 AT HIGH RISK FOR FALLS: ICD-10-CM

## 2022-05-10 DIAGNOSIS — E78.5 HYPERLIPIDEMIA, UNSPECIFIED HYPERLIPIDEMIA TYPE: ICD-10-CM

## 2022-05-10 DIAGNOSIS — N18.30 TYPE 2 DIABETES MELLITUS WITH STAGE 3 CHRONIC KIDNEY DISEASE, UNSPECIFIED WHETHER LONG TERM INSULIN USE, UNSPECIFIED WHETHER STAGE 3A OR 3B CKD (HCC): ICD-10-CM

## 2022-05-10 DIAGNOSIS — R26.89 POOR BALANCE: ICD-10-CM

## 2022-05-10 DIAGNOSIS — E03.9 HYPOTHYROIDISM, UNSPECIFIED TYPE: ICD-10-CM

## 2022-05-10 DIAGNOSIS — E11.22 TYPE 2 DIABETES MELLITUS WITH STAGE 3 CHRONIC KIDNEY DISEASE, UNSPECIFIED WHETHER LONG TERM INSULIN USE, UNSPECIFIED WHETHER STAGE 3A OR 3B CKD (HCC): ICD-10-CM

## 2022-05-10 LAB
A/G RATIO: 1.5 (ref 0.8–2)
ALBUMIN SERPL-MCNC: 3.8 G/DL (ref 3.4–4.8)
ALP BLD-CCNC: 83 U/L (ref 25–100)
ALT SERPL-CCNC: 7 U/L (ref 4–36)
ANION GAP SERPL CALCULATED.3IONS-SCNC: 16 MMOL/L (ref 3–16)
AST SERPL-CCNC: 13 U/L (ref 8–33)
BILIRUB SERPL-MCNC: 0.4 MG/DL (ref 0.3–1.2)
BUN BLDV-MCNC: 35 MG/DL (ref 6–20)
CALCIUM SERPL-MCNC: 10.4 MG/DL (ref 8.5–10.5)
CHLORIDE BLD-SCNC: 100 MMOL/L (ref 98–107)
CHOLESTEROL, TOTAL: 150 MG/DL (ref 0–200)
CO2: 22 MMOL/L (ref 20–30)
CREAT SERPL-MCNC: 1.9 MG/DL (ref 0.4–1.2)
FOLATE: 8.86 NG/ML
GFR AFRICAN AMERICAN: 41
GFR NON-AFRICAN AMERICAN: 34
GLOBULIN: 2.6 G/DL
GLUCOSE BLD-MCNC: 235 MG/DL (ref 74–106)
HCT VFR BLD CALC: 44.8 % (ref 40–54)
HDLC SERPL-MCNC: 60 MG/DL (ref 40–60)
HEMOGLOBIN: 14.3 G/DL (ref 13–18)
LDL CHOLESTEROL CALCULATED: 65 MG/DL
MCH RBC QN AUTO: 29.9 PG (ref 27–32)
MCHC RBC AUTO-ENTMCNC: 31.9 G/DL (ref 31–35)
MCV RBC AUTO: 93.7 FL (ref 80–100)
PDW BLD-RTO: 15 % (ref 11–16)
PLATELET # BLD: 298 K/UL (ref 150–400)
PMV BLD AUTO: 9.9 FL (ref 6–10)
POTASSIUM SERPL-SCNC: 4.1 MMOL/L (ref 3.4–5.1)
RBC # BLD: 4.78 M/UL (ref 4.5–6)
SODIUM BLD-SCNC: 138 MMOL/L (ref 136–145)
TOTAL PROTEIN: 6.4 G/DL (ref 6.4–8.3)
TRIGL SERPL-MCNC: 125 MG/DL (ref 0–249)
TSH SERPL DL<=0.05 MIU/L-ACNC: 0.94 UIU/ML (ref 0.27–4.2)
VITAMIN B-12: 401 PG/ML (ref 211–911)
VITAMIN D 25-HYDROXY: 29.3 (ref 32–100)
VLDLC SERPL CALC-MCNC: 25 MG/DL
WBC # BLD: 8 K/UL (ref 4–11)

## 2022-05-10 PROCEDURE — 84443 ASSAY THYROID STIM HORMONE: CPT

## 2022-05-10 PROCEDURE — 4040F PNEUMOC VAC/ADMIN/RCVD: CPT | Performed by: NURSE PRACTITIONER

## 2022-05-10 PROCEDURE — 80053 COMPREHEN METABOLIC PANEL: CPT

## 2022-05-10 PROCEDURE — 4004F PT TOBACCO SCREEN RCVD TLK: CPT | Performed by: NURSE PRACTITIONER

## 2022-05-10 PROCEDURE — 82306 VITAMIN D 25 HYDROXY: CPT

## 2022-05-10 PROCEDURE — 82607 VITAMIN B-12: CPT

## 2022-05-10 PROCEDURE — 1123F ACP DISCUSS/DSCN MKR DOCD: CPT | Performed by: NURSE PRACTITIONER

## 2022-05-10 PROCEDURE — 99204 OFFICE O/P NEW MOD 45 MIN: CPT | Performed by: NURSE PRACTITIONER

## 2022-05-10 PROCEDURE — 85027 COMPLETE CBC AUTOMATED: CPT

## 2022-05-10 PROCEDURE — 80061 LIPID PANEL: CPT

## 2022-05-10 PROCEDURE — G8427 DOCREV CUR MEDS BY ELIG CLIN: HCPCS | Performed by: NURSE PRACTITIONER

## 2022-05-10 PROCEDURE — 82746 ASSAY OF FOLIC ACID SERUM: CPT

## 2022-05-10 PROCEDURE — G8420 CALC BMI NORM PARAMETERS: HCPCS | Performed by: NURSE PRACTITIONER

## 2022-05-10 PROCEDURE — 3051F HG A1C>EQUAL 7.0%<8.0%: CPT | Performed by: NURSE PRACTITIONER

## 2022-05-10 RX ORDER — METOPROLOL SUCCINATE 25 MG/1
25 TABLET, EXTENDED RELEASE ORAL DAILY
Status: ON HOLD | COMMUNITY
End: 2022-06-02

## 2022-05-10 RX ORDER — AMLODIPINE BESYLATE 5 MG/1
TABLET ORAL
Status: ON HOLD | COMMUNITY
Start: 2022-04-07 | End: 2022-06-04 | Stop reason: HOSPADM

## 2022-05-10 SDOH — ECONOMIC STABILITY: FOOD INSECURITY: WITHIN THE PAST 12 MONTHS, YOU WORRIED THAT YOUR FOOD WOULD RUN OUT BEFORE YOU GOT MONEY TO BUY MORE.: NEVER TRUE

## 2022-05-10 SDOH — ECONOMIC STABILITY: FOOD INSECURITY: WITHIN THE PAST 12 MONTHS, THE FOOD YOU BOUGHT JUST DIDN'T LAST AND YOU DIDN'T HAVE MONEY TO GET MORE.: NEVER TRUE

## 2022-05-10 ASSESSMENT — PATIENT HEALTH QUESTIONNAIRE - PHQ9
2. FEELING DOWN, DEPRESSED OR HOPELESS: 0
SUM OF ALL RESPONSES TO PHQ QUESTIONS 1-9: 0
SUM OF ALL RESPONSES TO PHQ9 QUESTIONS 1 & 2: 0
1. LITTLE INTEREST OR PLEASURE IN DOING THINGS: 0
SUM OF ALL RESPONSES TO PHQ QUESTIONS 1-9: 0

## 2022-05-10 ASSESSMENT — SOCIAL DETERMINANTS OF HEALTH (SDOH): HOW HARD IS IT FOR YOU TO PAY FOR THE VERY BASICS LIKE FOOD, HOUSING, MEDICAL CARE, AND HEATING?: NOT HARD AT ALL

## 2022-05-17 ASSESSMENT — ENCOUNTER SYMPTOMS
EYES NEGATIVE: 1
GASTROINTESTINAL NEGATIVE: 1
RESPIRATORY NEGATIVE: 1

## 2022-05-17 NOTE — PROGRESS NOTES
SUBJECTIVE:    Patient ID: Arnie Guzman is a 81 y.o. male. Chief Complaint   Patient presents with    Diabetes    New Patient         HPI:  He comes in to establish care as a new patient. He has been seeing [facility] for it for some time. He has diabetes hypertension and specifically he is having a lot of lower extremity weakness with multiple extended falls. Fortunately he has not had any fractures or broken bones but he has had some superficial soft tissue injuries. He is in a wheelchair or walks with a walker unfortunately his wife has dementia. There are safety concerns for him in the home. He states that he is taking all medications for his diabetes hypertension without any complaints or problems he has not had any syncopal episodes or chest pain or new onset of dizziness. He has good family support. His daughter is with him currently. He is on aspirin 81 mg chewable daily so he is at risk for bleeds. This weakness has come on over a long period of time. And is worsening. Long time smoker. Smokes 1 ppd for 53 years. Patient's medications,allergies, past medical, surgical, social and family histories were reviewed and updated as appropriate.   .  Current Outpatient Medications on File Prior to Visit   Medication Sig Dispense Refill    metoprolol succinate (TOPROL XL) 25 MG extended release tablet Take 25 mg by mouth daily      aspirin 81 MG chewable tablet Take 1 tablet by mouth daily 30 tablet 3    hydroCHLOROthiazide (HYDRODIURIL) 25 MG tablet Take 0.5 tablets by mouth daily 30 tablet 3    metoprolol tartrate (LOPRESSOR) 25 MG tablet Take 1 tablet by mouth 2 times daily 60 tablet 3    vitamin D (ERGOCALCIFEROL) 1.25 MG (75539 UT) CAPS capsule Take 1 capsule by mouth once a week 8 capsule 0    SITagliptin (JANUVIA) 50 MG tablet Take 2 tablets by mouth daily 60 tablet 0    losartan (COZAAR) 100 MG tablet Take 100 mg by mouth daily      furosemide (LASIX) 20 MG tablet Take 20 mg by mouth daily      tamsulosin (FLOMAX) 0.4 MG capsule Take 1 capsule by mouth daily 30 capsule 3    levothyroxine (SYNTHROID) 50 MCG tablet take 1 tablet by mouth once daily 30 tablet 1    rosuvastatin (CRESTOR) 20 MG tablet Take 20 mg by mouth daily      omeprazole (PRILOSEC) 20 MG delayed release capsule Take 20 mg by mouth daily      cilostazol (PLETAL) 100 MG tablet Take 1 tablet by mouth 2 times daily. 60 tablet 6    amLODIPine (NORVASC) 5 MG tablet        No current facility-administered medications on file prior to visit. Review of Systems   Constitutional: Negative. HENT: Negative. Eyes: Negative. Respiratory: Negative. Cardiovascular: Negative. Gastrointestinal: Negative. Genitourinary: Negative. Musculoskeletal: Negative. Skin: Negative. Neurological: Positive for weakness. Psychiatric/Behavioral: Negative. OBJECTIVE:  /60 (Site: Right Upper Arm, Position: Sitting, Cuff Size: Medium Adult)   Pulse 66   Temp 98 °F (36.7 °C) (Temporal)   Resp 18   Ht 5' 10\" (1.778 m)   Wt 174 lb (78.9 kg)   SpO2 99%   BMI 24.97 kg/m²    Physical Exam  Vitals and nursing note reviewed. Constitutional:       Appearance: He is well-developed. HENT:      Head: Normocephalic and atraumatic. Eyes:      Conjunctiva/sclera: Conjunctivae normal.      Pupils: Pupils are equal, round, and reactive to light. Neck:      Thyroid: No thyromegaly. Vascular: No JVD. Cardiovascular:      Rate and Rhythm: Normal rate and regular rhythm. Heart sounds: No murmur heard. No friction rub. No gallop. Pulmonary:      Effort: Pulmonary effort is normal. No respiratory distress. Breath sounds: Normal breath sounds. No wheezing or rales. Abdominal:      General: Bowel sounds are normal. There is no distension. Palpations: Abdomen is soft. Tenderness: There is no abdominal tenderness. There is no guarding. Musculoskeletal:         General: No tenderness. Cervical back: Normal range of motion and neck supple. Skin:     General: Skin is warm and dry. Findings: No rash. Neurological:      Mental Status: He is alert and oriented to person, place, and time. Motor: Weakness present.       Coordination: Coordination abnormal.      Gait: Gait abnormal.      Comments: weakness   Psychiatric:         Judgment: Judgment normal.         Results in Past 30 Days  Result Component Current Result Ref Range Previous Result Ref Range   Albumin/Globulin Ratio 1.5 (5/10/2022) 0.8 - 2.0 1.0 (4/18/2022) 0.8 - 2.0   Albumin 3.8 (5/10/2022) 3.4 - 4.8 g/dL 3.4 (4/18/2022) 3.4 - 4.8 g/dL   Alkaline Phosphatase 83 (5/10/2022) 25 - 100 U/L 81 (4/18/2022) 25 - 100 U/L   ALT 7 (5/10/2022) 4 - 36 U/L 8 (4/18/2022) 4 - 36 U/L   AST 13 (5/10/2022) 8 - 33 U/L 11 (4/18/2022) 8 - 33 U/L   BUN 35 (H) (5/10/2022) 6 - 20 mg/dL 25 (H) (4/18/2022) 6 - 20 mg/dL   Calcium 10.4 (5/10/2022) 8.5 - 10.5 mg/dL 10.2 (4/18/2022) 8.5 - 10.5 mg/dL   Chloride 100 (5/10/2022) 98 - 107 mmol/L 101 (4/18/2022) 98 - 107 mmol/L   CO2 22 (5/10/2022) 20 - 30 mmol/L 22 (4/18/2022) 20 - 30 mmol/L   CREATININE 1.9 (H) (5/10/2022) 0.4 - 1.2 mg/dL 1.5 (H) (4/18/2022) 0.4 - 1.2 mg/dL   GFR  41 (L) (5/10/2022) >59 54 (L) (4/18/2022) >59   GFR Non- 34 (L) (5/10/2022) >59 45 (L) (4/18/2022) >59   Globulin 2.6 (5/10/2022) Not Established g/dL 3.4 (4/18/2022) Not Established g/dL   Glucose 235 (H) (5/10/2022) 74 - 106 mg/dL 174 (H) (4/18/2022) 74 - 106 mg/dL   Potassium 4.1 (5/10/2022) 3.4 - 5.1 mmol/L 3.9 (4/18/2022) 3.4 - 5.1 mmol/L   Sodium 138 (5/10/2022) 136 - 145 mmol/L 138 (4/18/2022) 136 - 145 mmol/L   Total Bilirubin 0.4 (5/10/2022) 0.3 - 1.2 mg/dL 0.8 (4/18/2022) 0.3 - 1.2 mg/dL   Total Protein 6.4 (5/10/2022) 6.4 - 8.3 g/dL 6.8 (4/18/2022) 6.4 - 8.3 g/dL       Hemoglobin A1C (%)   Date Value   04/04/2022 7.9 (H)     Microscopic Examination (no units)   Date Value   04/18/2022 YES     LDL Calculated (mg/dL)   Date Value   05/10/2022 65           Lab Results   Component Value Date    TSH 0.94 05/10/2022         ASSESSMENT/PLAN:     Jennifer Qiu was seen today for diabetes and new patient. Diagnoses and all orders for this visit:    Type 2 diabetes mellitus with stage 3 chronic kidney disease, unspecified whether long term insulin use, unspecified whether stage 3a or 3b CKD (HCC)  -     Cancel: Hemoglobin A1C; Future  -     Cancel: Lipid Panel; Future  -     Comprehensive Metabolic Panel; Future  -     CBC; Future  A1C is 7.0      Frequent falls  -     Cancel: Ambulatory referral to Physical Therapy home health. Needs   Poor balance  -     Cancel: Ambulatory referral to Physical Therapy home health  May need to see Neurology. Weakness  Needs physical therapy. Hypothyroidism, unspecified type  -     TSH; Future    Vitamin D deficiency  -     Vitamin B12 & Folate; Future  -     Vitamin D 25 Hydroxy; Future    Hyperlipidemia, unspecified hyperlipidemia type  I have advised him on low-fat diet, exercise and weight control. I am going to continue on current medication. I have also advised him on the possible side effects from the medication. I will monitor his liver functions and lipid profile every few months. Lipid well controlled. Lab Results   Component Value Date    LDLCALC 65 05/10/2022     Pure hypercholesterolemia  -     Lipid Panel; Future    At high risk for falls    Tobacco abuse  Advised to stop smoking. There are no discontinued medications.

## 2022-05-19 ENCOUNTER — TELEPHONE (OUTPATIENT)
Dept: PRIMARY CARE CLINIC | Age: 82
End: 2022-05-19

## 2022-05-19 NOTE — TELEPHONE ENCOUNTER
Benny Bruce gave me the message from the home health agency pt was referred to; pt will need to be seen by [facility]

## 2022-05-19 NOTE — TELEPHONE ENCOUNTER
I called and spoke with [facility] and they have the referral and has already set things up with patient for Saturday.

## 2022-05-27 ENCOUNTER — TELEPHONE (OUTPATIENT)
Dept: PRIMARY CARE CLINIC | Age: 82
End: 2022-05-27

## 2022-05-27 NOTE — TELEPHONE ENCOUNTER
Patient has had 4 falls since Saturday and another one this morning. He si very weak and dizzy and he is waking up at night and seeing things. [facility] suggested that he be sent for rehab or to the ER. Please reply.

## 2022-06-01 ENCOUNTER — APPOINTMENT (OUTPATIENT)
Dept: CT IMAGING | Facility: HOSPITAL | Age: 82
DRG: 200 | End: 2022-06-01
Payer: MEDICARE

## 2022-06-01 ENCOUNTER — TELEPHONE (OUTPATIENT)
Dept: PRIMARY CARE CLINIC | Age: 82
End: 2022-06-01

## 2022-06-01 ENCOUNTER — APPOINTMENT (OUTPATIENT)
Dept: GENERAL RADIOLOGY | Facility: HOSPITAL | Age: 82
DRG: 200 | End: 2022-06-01
Payer: MEDICARE

## 2022-06-01 ENCOUNTER — HOSPITAL ENCOUNTER (INPATIENT)
Facility: HOSPITAL | Age: 82
LOS: 3 days | Discharge: HOME | DRG: 200 | End: 2022-06-04
Attending: HOSPITALIST | Admitting: INTERNAL MEDICINE
Payer: MEDICARE

## 2022-06-01 DIAGNOSIS — R52 PAIN: ICD-10-CM
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
## 2022-06-01 DIAGNOSIS — R29.6 MULTIPLE FALLS: Primary | ICD-10-CM

## 2022-06-01 DIAGNOSIS — N28.9 ACUTE RENAL INSUFFICIENCY: ICD-10-CM

## 2022-06-01 DIAGNOSIS — I95.9 HYPOTENSION, UNSPECIFIED HYPOTENSION TYPE: ICD-10-CM

## 2022-06-01 PROBLEM — N17.9 ACUTE RENAL FAILURE (ARF) (HCC): Status: ACTIVE | Noted: 2022-06-01

## 2022-06-01 LAB
A/G RATIO: 1.3 (ref 0.8–2)
ALBUMIN SERPL-MCNC: 3.7 G/DL (ref 3.4–4.8)
ALP BLD-CCNC: 83 U/L (ref 25–100)
ALT SERPL-CCNC: <5 U/L (ref 4–36)
AMORPHOUS: ABNORMAL /HPF
ANION GAP SERPL CALCULATED.3IONS-SCNC: 16 MMOL/L (ref 3–16)
AST SERPL-CCNC: 7 U/L (ref 8–33)
BASOPHILS ABSOLUTE: 0.1 K/UL (ref 0–0.1)
BASOPHILS RELATIVE PERCENT: 0.6 %
BILIRUB SERPL-MCNC: 0.4 MG/DL (ref 0.3–1.2)
BILIRUBIN URINE: NEGATIVE
BLOOD, URINE: NEGATIVE
BUN BLDV-MCNC: 36 MG/DL (ref 6–20)
CALCIUM SERPL-MCNC: 9.7 MG/DL (ref 8.5–10.5)
CHLORIDE BLD-SCNC: 99 MMOL/L (ref 98–107)
CLARITY: CLEAR
CO2: 24 MMOL/L (ref 20–30)
COLOR: YELLOW
CREAT SERPL-MCNC: 2.8 MG/DL (ref 0.4–1.2)
EOSINOPHILS ABSOLUTE: 0.5 K/UL (ref 0–0.4)
EOSINOPHILS RELATIVE PERCENT: 5.7 %
EPITHELIAL CELLS, UA: ABNORMAL /HPF (ref 0–5)
GFR AFRICAN AMERICAN: 26
GFR NON-AFRICAN AMERICAN: 22
GLOBULIN: 2.8 G/DL
GLUCOSE BLD-MCNC: 156 MG/DL (ref 74–106)
GLUCOSE BLD-MCNC: 208 MG/DL (ref 74–106)
GLUCOSE URINE: NEGATIVE MG/DL
HCT VFR BLD CALC: 42.5 % (ref 40–54)
HEMOGLOBIN: 14.5 G/DL (ref 13–18)
IMMATURE GRANULOCYTES #: 0.1 K/UL
IMMATURE GRANULOCYTES %: 0.7 % (ref 0–5)
KETONES, URINE: NEGATIVE MG/DL
LEUKOCYTE ESTERASE, URINE: NEGATIVE
LYMPHOCYTES ABSOLUTE: 2.1 K/UL (ref 1.5–4)
LYMPHOCYTES RELATIVE PERCENT: 23.7 %
MAGNESIUM: 1.7 MG/DL (ref 1.7–2.4)
MCH RBC QN AUTO: 30 PG (ref 27–32)
MCHC RBC AUTO-ENTMCNC: 34.1 G/DL (ref 31–35)
MCV RBC AUTO: 88 FL (ref 80–100)
MICROSCOPIC EXAMINATION: YES
MONOCYTES ABSOLUTE: 0.7 K/UL (ref 0.2–0.8)
MONOCYTES RELATIVE PERCENT: 7.2 %
MUCUS: ABNORMAL /LPF
NEUTROPHILS ABSOLUTE: 5.6 K/UL (ref 2–7.5)
NEUTROPHILS RELATIVE PERCENT: 62.1 %
NITRITE, URINE: NEGATIVE
PDW BLD-RTO: 14.4 % (ref 11–16)
PERFORMED ON: ABNORMAL
PH UA: 5 (ref 5–8)
PLATELET # BLD: 324 K/UL (ref 150–400)
PMV BLD AUTO: 9.2 FL (ref 6–10)
POTASSIUM REFLEX MAGNESIUM: 2.6 MMOL/L (ref 3.4–5.1)
PRO-BNP: 250 PG/ML (ref 0–1800)
PROTEIN UA: 30 MG/DL
RBC # BLD: 4.83 M/UL (ref 4.5–6)
RBC UA: ABNORMAL /HPF (ref 0–4)
SARS-COV-2, NAAT: NOT DETECTED
SODIUM BLD-SCNC: 139 MMOL/L (ref 136–145)
SPECIFIC GRAVITY UA: 1.01 (ref 1–1.03)
TOTAL CK: 26 U/L (ref 26–174)
TOTAL PROTEIN: 6.5 G/DL (ref 6.4–8.3)
TROPONIN: <0.3 NG/ML
URINE REFLEX TO CULTURE: ABNORMAL
URINE TYPE: ABNORMAL
UROBILINOGEN, URINE: 0.2 E.U./DL
WBC # BLD: 9 K/UL (ref 4–11)
WBC UA: ABNORMAL /HPF (ref 0–5)

## 2022-06-01 PROCEDURE — 70450 CT HEAD/BRAIN W/O DYE: CPT

## 2022-06-01 PROCEDURE — 81001 URINALYSIS AUTO W/SCOPE: CPT

## 2022-06-01 PROCEDURE — 6370000000 HC RX 637 (ALT 250 FOR IP): Performed by: PHYSICIAN ASSISTANT

## 2022-06-01 PROCEDURE — 6370000000 HC RX 637 (ALT 250 FOR IP): Performed by: HOSPITALIST

## 2022-06-01 PROCEDURE — 36415 COLL VENOUS BLD VENIPUNCTURE: CPT

## 2022-06-01 PROCEDURE — 84484 ASSAY OF TROPONIN QUANT: CPT

## 2022-06-01 PROCEDURE — 71045 X-RAY EXAM CHEST 1 VIEW: CPT

## 2022-06-01 PROCEDURE — 85025 COMPLETE CBC W/AUTO DIFF WBC: CPT

## 2022-06-01 PROCEDURE — 1200000000 HC SEMI PRIVATE

## 2022-06-01 PROCEDURE — 2580000003 HC RX 258: Performed by: HOSPITALIST

## 2022-06-01 PROCEDURE — 80053 COMPREHEN METABOLIC PANEL: CPT

## 2022-06-01 PROCEDURE — 94761 N-INVAS EAR/PLS OXIMETRY MLT: CPT

## 2022-06-01 PROCEDURE — 2580000003 HC RX 258: Performed by: PHYSICIAN ASSISTANT

## 2022-06-01 PROCEDURE — 83735 ASSAY OF MAGNESIUM: CPT

## 2022-06-01 PROCEDURE — 83880 ASSAY OF NATRIURETIC PEPTIDE: CPT

## 2022-06-01 PROCEDURE — 82550 ASSAY OF CK (CPK): CPT

## 2022-06-01 PROCEDURE — 87635 SARS-COV-2 COVID-19 AMP PRB: CPT

## 2022-06-01 PROCEDURE — 93005 ELECTROCARDIOGRAM TRACING: CPT

## 2022-06-01 PROCEDURE — 6360000002 HC RX W HCPCS: Performed by: PHYSICIAN ASSISTANT

## 2022-06-01 PROCEDURE — 99285 EMERGENCY DEPT VISIT HI MDM: CPT

## 2022-06-01 PROCEDURE — 96360 HYDRATION IV INFUSION INIT: CPT

## 2022-06-01 RX ORDER — CILOSTAZOL 100 MG/1
100 TABLET ORAL 2 TIMES DAILY
Status: DISCONTINUED | OUTPATIENT
Start: 2022-06-01 | End: 2022-06-04 | Stop reason: HOSPADM

## 2022-06-01 RX ORDER — SODIUM CHLORIDE 9 MG/ML
INJECTION, SOLUTION INTRAVENOUS CONTINUOUS
Status: DISCONTINUED | OUTPATIENT
Start: 2022-06-01 | End: 2022-06-02

## 2022-06-01 RX ORDER — POTASSIUM CHLORIDE 20 MEQ/1
40 TABLET, EXTENDED RELEASE ORAL ONCE
Status: COMPLETED | OUTPATIENT
Start: 2022-06-01 | End: 2022-06-01

## 2022-06-01 RX ORDER — 0.9 % SODIUM CHLORIDE 0.9 %
500 INTRAVENOUS SOLUTION INTRAVENOUS ONCE
Status: COMPLETED | OUTPATIENT
Start: 2022-06-01 | End: 2022-06-01

## 2022-06-01 RX ORDER — INSULIN LISPRO 100 [IU]/ML
0-3 INJECTION, SOLUTION INTRAVENOUS; SUBCUTANEOUS NIGHTLY
Status: DISCONTINUED | OUTPATIENT
Start: 2022-06-01 | End: 2022-06-04 | Stop reason: HOSPADM

## 2022-06-01 RX ORDER — POTASSIUM CHLORIDE 750 MG/1
20 CAPSULE, EXTENDED RELEASE ORAL ONCE
Status: COMPLETED | OUTPATIENT
Start: 2022-06-01 | End: 2022-06-01

## 2022-06-01 RX ORDER — FUROSEMIDE 20 MG/1
20 TABLET ORAL DAILY
Status: DISCONTINUED | OUTPATIENT
Start: 2022-06-01 | End: 2022-06-04 | Stop reason: HOSPADM

## 2022-06-01 RX ORDER — ROSUVASTATIN CALCIUM 20 MG/1
20 TABLET, COATED ORAL DAILY
Status: DISCONTINUED | OUTPATIENT
Start: 2022-06-01 | End: 2022-06-04 | Stop reason: HOSPADM

## 2022-06-01 RX ORDER — LOSARTAN POTASSIUM 50 MG/1
100 TABLET ORAL DAILY
Status: DISCONTINUED | OUTPATIENT
Start: 2022-06-01 | End: 2022-06-04 | Stop reason: HOSPADM

## 2022-06-01 RX ORDER — ONDANSETRON 2 MG/ML
4 INJECTION INTRAMUSCULAR; INTRAVENOUS EVERY 6 HOURS PRN
Status: DISCONTINUED | OUTPATIENT
Start: 2022-06-01 | End: 2022-06-04 | Stop reason: HOSPADM

## 2022-06-01 RX ORDER — DEXTROSE MONOHYDRATE 50 MG/ML
100 INJECTION, SOLUTION INTRAVENOUS PRN
Status: DISCONTINUED | OUTPATIENT
Start: 2022-06-01 | End: 2022-06-04 | Stop reason: HOSPADM

## 2022-06-01 RX ORDER — ASPIRIN 81 MG/1
81 TABLET, CHEWABLE ORAL DAILY
Status: DISCONTINUED | OUTPATIENT
Start: 2022-06-01 | End: 2022-06-04 | Stop reason: HOSPADM

## 2022-06-01 RX ORDER — ALOGLIPTIN 12.5 MG/1
6.25 TABLET, FILM COATED ORAL DAILY
Status: DISCONTINUED | OUTPATIENT
Start: 2022-06-01 | End: 2022-06-04 | Stop reason: HOSPADM

## 2022-06-01 RX ORDER — AMLODIPINE BESYLATE 5 MG/1
5 TABLET ORAL DAILY
Status: DISCONTINUED | OUTPATIENT
Start: 2022-06-01 | End: 2022-06-04 | Stop reason: HOSPADM

## 2022-06-01 RX ORDER — HYDROCHLOROTHIAZIDE 25 MG/1
12.5 TABLET ORAL DAILY
Status: DISCONTINUED | OUTPATIENT
Start: 2022-06-01 | End: 2022-06-04 | Stop reason: HOSPADM

## 2022-06-01 RX ORDER — TAMSULOSIN HYDROCHLORIDE 0.4 MG/1
0.4 CAPSULE ORAL DAILY
Status: DISCONTINUED | OUTPATIENT
Start: 2022-06-01 | End: 2022-06-04 | Stop reason: HOSPADM

## 2022-06-01 RX ORDER — PANTOPRAZOLE SODIUM 40 MG/1
40 TABLET, DELAYED RELEASE ORAL
Status: DISCONTINUED | OUTPATIENT
Start: 2022-06-02 | End: 2022-06-04 | Stop reason: HOSPADM

## 2022-06-01 RX ORDER — ACETAMINOPHEN 325 MG/1
650 TABLET ORAL EVERY 6 HOURS PRN
Status: DISCONTINUED | OUTPATIENT
Start: 2022-06-01 | End: 2022-06-04 | Stop reason: HOSPADM

## 2022-06-01 RX ORDER — ACETAMINOPHEN 650 MG/1
650 SUPPOSITORY RECTAL EVERY 6 HOURS PRN
Status: DISCONTINUED | OUTPATIENT
Start: 2022-06-01 | End: 2022-06-04 | Stop reason: HOSPADM

## 2022-06-01 RX ORDER — POLYETHYLENE GLYCOL 3350 17 G/17G
17 POWDER, FOR SOLUTION ORAL DAILY PRN
Status: DISCONTINUED | OUTPATIENT
Start: 2022-06-01 | End: 2022-06-04 | Stop reason: HOSPADM

## 2022-06-01 RX ORDER — LEVOTHYROXINE SODIUM 0.05 MG/1
50 TABLET ORAL DAILY
Status: DISCONTINUED | OUTPATIENT
Start: 2022-06-02 | End: 2022-06-04 | Stop reason: HOSPADM

## 2022-06-01 RX ORDER — ENOXAPARIN SODIUM 100 MG/ML
40 INJECTION SUBCUTANEOUS DAILY
Status: DISCONTINUED | OUTPATIENT
Start: 2022-06-01 | End: 2022-06-02

## 2022-06-01 RX ORDER — INSULIN LISPRO 100 [IU]/ML
0-6 INJECTION, SOLUTION INTRAVENOUS; SUBCUTANEOUS
Status: DISCONTINUED | OUTPATIENT
Start: 2022-06-02 | End: 2022-06-04 | Stop reason: HOSPADM

## 2022-06-01 RX ORDER — ONDANSETRON 4 MG/1
4 TABLET, ORALLY DISINTEGRATING ORAL EVERY 8 HOURS PRN
Status: DISCONTINUED | OUTPATIENT
Start: 2022-06-01 | End: 2022-06-04 | Stop reason: HOSPADM

## 2022-06-01 RX ADMIN — INSULIN LISPRO 1 UNITS: 100 INJECTION, SOLUTION INTRAVENOUS; SUBCUTANEOUS at 22:52

## 2022-06-01 RX ADMIN — ENOXAPARIN SODIUM 40 MG: 100 INJECTION SUBCUTANEOUS at 22:40

## 2022-06-01 RX ADMIN — SODIUM CHLORIDE 500 ML: 9 INJECTION, SOLUTION INTRAVENOUS at 14:19

## 2022-06-01 RX ADMIN — SODIUM CHLORIDE: 9 INJECTION, SOLUTION INTRAVENOUS at 22:48

## 2022-06-01 RX ADMIN — ASPIRIN 81 MG 81 MG: 81 TABLET ORAL at 22:40

## 2022-06-01 RX ADMIN — ALOGLIPTIN 6.25 MG: 12.5 TABLET, FILM COATED ORAL at 22:41

## 2022-06-01 RX ADMIN — TAMSULOSIN HYDROCHLORIDE 0.4 MG: 0.4 CAPSULE ORAL at 22:41

## 2022-06-01 RX ADMIN — CILOSTAZOL 100 MG: 100 TABLET ORAL at 22:51

## 2022-06-01 RX ADMIN — ROSUVASTATIN 20 MG: 20 TABLET, FILM COATED ORAL at 22:40

## 2022-06-01 RX ADMIN — METOPROLOL TARTRATE 25 MG: 25 TABLET, FILM COATED ORAL at 22:40

## 2022-06-01 RX ADMIN — POTASSIUM CHLORIDE 20 MEQ: 10 CAPSULE, COATED, EXTENDED RELEASE ORAL at 22:43

## 2022-06-01 RX ADMIN — POTASSIUM CHLORIDE 40 MEQ: 1500 TABLET, EXTENDED RELEASE ORAL at 15:58

## 2022-06-01 ASSESSMENT — LIFESTYLE VARIABLES: HOW OFTEN DO YOU HAVE A DRINK CONTAINING ALCOHOL: NEVER

## 2022-06-01 NOTE — ED TRIAGE NOTES
Pt was sent to the ER today by PCP after multiple falls and weakness. Pt states \"I cant walk\" pt has a walker at home. Old skin tears and abrasions are noted.

## 2022-06-01 NOTE — ED NOTES
Dr. Lindsey Perez requested consult with Surgery, attempted to call [facility] twice no answers. Called [facility] directly, requested consult.       Lia Cardona RN  06/01/22 5155

## 2022-06-01 NOTE — LETTER
#:     Plan to send to home with family and 24/7 care at home and [facility] on Saturday. You can call [phone] for a signed DC summary over the weekend. Have a great weekend! Charis Peters RN  Care Coordinator  [facility]  [address], [place]  Office:  [phone]  Fax:  [phone]  [email or web address]

## 2022-06-01 NOTE — TELEPHONE ENCOUNTER
[facility] Duffy [facility] stating that patient has had 3 more falls since speaking with me last Friday. She is wanting to know about possible rehab. I did not see the message last Friday from [place] talking about UTI or electrolyte imbalance.

## 2022-06-01 NOTE — ED PROVIDER NOTES
[address] [facility] ENCOUNTER      Pt Name: Selin Shine  MRN: [id number]  YOB: 1940  Date of evaluation: 6/1/2022  Provider: Queenie Boyle, [facility]       Chief Complaint   Patient presents with    Extremity Weakness    Fall     multiple         HISTORY OF PRESENT ILLNESS  (Location/Symptom, Timing/Onset, Context/Setting, Quality, Duration, Modifying Factors, Severity.)   Selin Shine is a 81 y.o. male who presents to the emergency department for weakness and multiple falls. Family states that he has been following for quite some time but has had increased frequency of these. He is fell several times over the last couple of days. When he does fall he gets skin tears likely has not broken anything. However he did fall and diagnosed with fractured a ribs on 4/18/2022. He has had some pain and difficulty with this. Patient was sent here by his primary care provider for evaluation she would like to see if he could be possibly admitted to swing bed for rehab but once neurological evaluation/rule out performed. Patient states that he does get a little lightheaded and dizzy especially when he goes from a sitting to a standing position. States that sometimes he feels like he blacks out but he states he gets the sensation when this is going to happen and knows it is coming. Patient uses a walker at home to get around with but he states he thinks it is more dangerous than him walking without it. Patient states he eats and drinks what he wants. Denies any nausea vomiting or diarrhea. Denies any chest pain but states he does sometimes get little short of breath. Denies any numbness or tingling of the extremities. States he just feels weak to the lower extremities himself. Patient was able to get up out of the wheelchair and sat on the stretcher with assist.  Patient denies any dysuria or change urinary frequency.   Patient states his only problem he has is that he has difficulty getting to the bathroom in time to be able to use it as of his difficulty with ambulation. Patient's family states they cannot stay with him at all times during the day so there are episodes when he is there by themselves and they are concerned about falls and injury. Nursing notes were reviewed. REVIEW OFSYSTEMS    (2-9 systems for level 4, 10 or more for level 5)   ROS:  General:  No fevers, no chills, + weakness, +multiple falls  Cardiovascular:  No chest pain, no palpitations  Respiratory:  No shortness of breath, no cough, no wheezing  Gastrointestinal:  No pain, no nausea, no vomiting, no diarrhea  Musculoskeletal:  No muscle pain, no joint pain  Skin:  No rash, no easy bruising  Neurologic:  No speech problems, no headache, no extremity weakness  Psychiatric:  No anxiety  Genitourinary:  No dysuria, no hematuria    Except as noted above the remainder of the review of systems was reviewed and negative. PAST MEDICAL HISTORY     Past Medical History:   Diagnosis Date    Cataract     [facility]     Erectile dysfunction     Hard of hearing     hearing aid    High cholesterol     Hypertension     Hypothyroidism     Peripheral vascular disease ([facility]     Pneumonia     Stroke ([facility] [facility]          SURGICAL HISTORY       Past Surgical History:   Procedure Laterality Date    CYST REMOVAL      under right arm     EYE SURGERY  01/2012    cataract    FOOT SURGERY      growth removed; left         CURRENT MEDICATIONS       Previous Medications    AMLODIPINE (NORVASC) 5 MG TABLET        ASPIRIN 81 MG CHEWABLE TABLET    Take 1 tablet by mouth daily    CILOSTAZOL (PLETAL) 100 MG TABLET    Take 1 tablet by mouth 2 times daily.     FUROSEMIDE (LASIX) 20 MG TABLET    Take 20 mg by mouth daily    HYDROCHLOROTHIAZIDE (HYDRODIURIL) 25 MG TABLET    Take 0.5 tablets by mouth daily    LEVOTHYROXINE (SYNTHROID) 50 MCG TABLET    take 1 tablet by mouth once daily    LOSARTAN (COZAAR) 100 MG TABLET    Take 100 mg by mouth daily    METOPROLOL SUCCINATE (TOPROL XL) 25 MG EXTENDED RELEASE TABLET    Take 25 mg by mouth daily    METOPROLOL TARTRATE (LOPRESSOR) 25 MG TABLET    Take 1 tablet by mouth 2 times daily    OMEPRAZOLE (PRILOSEC) 20 MG DELAYED RELEASE CAPSULE    Take 20 mg by mouth daily    ROSUVASTATIN (CRESTOR) 20 MG TABLET    Take 20 mg by mouth daily    SITAGLIPTIN (JANUVIA) 50 MG TABLET    Take 2 tablets by mouth daily    TAMSULOSIN (FLOMAX) 0.4 MG CAPSULE    Take 1 capsule by mouth daily    VITAMIN D (ERGOCALCIFEROL) 1.25 MG (74052 UT) CAPS CAPSULE    Take 1 capsule by mouth once a week       ALLERGIES     Patient has no known allergies. FAMILY HISTORY       Family History   Problem Relation Age of Onset    Stroke Mother     Heart Disease Daughter         MI    Stroke Daughter           SOCIAL HISTORY       Social History     Socioeconomic History    Marital status:      Spouse name: None    Number of children: None    Years of education: None    Highest education level: None   Occupational History    None   Tobacco Use    Smoking status: Current Every Day Smoker     Packs/day: 1.00     Years: 53.00     Pack years: 53.00     Types: Cigarettes    Smokeless tobacco: Never Used   Substance and Sexual Activity    Alcohol use: No    Drug use: No    Sexual activity: None   Other Topics Concern    None   Social History Narrative    None     Social Determinants of Health     Financial Resource Strain: Low Risk     Difficulty of Paying Living Expenses: Not hard at all   Food Insecurity: No Food Insecurity    Worried About Running Out of Food in the Last Year: Never true    Niraj of Food in the Last Year: Never true   Transportation Needs:     Lack of Transportation (Medical): Not on file    Lack of Transportation (Non-Medical):  Not on file   Physical Activity:     Days of Exercise per Week: Not on file    Minutes of Exercise per Session: Not on file   Stress:     Feeling of Stress : Not on file   Social Connections:     Frequency of Communication with Friends and Family: Not on file    Frequency of Social Gatherings with Friends and Family: Not on file    Attends Bahai Services: Not on file    Active Member of [address] [place] [address] or Organizations: Not on file    Attends Club or Organization Meetings: Not on file    Marital Status: Not on file   Intimate Partner Violence:     Fear of Current or Ex-Partner: Not on file    Emotionally Abused: Not on file    Physically Abused: Not on file    Sexually Abused: Not on file   Housing Stability:     Unable to Pay for Housing in the Last Year: Not on file    Number of [place] in the Last Year: Not on file    Unstable Housing in the Last Year: Not on file         PHYSICAL EXAM    (up to 7 for level 4, 8 or more for level 5)     ED Triage Vitals   BP Temp Temp src Pulse Resp SpO2 Height Weight   -- -- -- -- -- -- -- --       Physical Exam  General :Patient is awake, alert, oriented, in no acute distress, nontoxic appearing  HEENT: Pupils are equally round and reactive to light, EOMI, conjunctivae clear. Oral mucosa is moist, no exudate. Uvula is midline  Cardiac: Heart regular rate, rhythm, no murmurs, rubs, or gallops  Lungs: Lungs are clear to auscultation, there is no wheezing, rhonchi, or rales. There is no use of accessory muscles. Abdomen: Abdomen is soft, nontender, nondistended. There is no firm or pulsatile masses, no rebound rigidity or guarding. Musculoskeletal: 5 out of 5 strength in all 4 extremities. No focal muscle deficits are appreciated  Neuro: Motor intact, sensory intact, level of consciousness is normal, patient does have bilateral lower extremity weakness cannot raise legs off the bed against resistance but even without resistance he cannot hold them up for very long. He has good bilateral  strength of the upper extremities.   Dermatology: Skin is warm and dry  Psych: Mentation is grossly normal, cognition is grossly normal. Affect is appropriate. DIAGNOSTIC RESULTS     EKG: All EKG's are interpreted by the Emergency Department Physician who either signs or Co-signs this chart in the [address] a cardiologist.    The EKG interpreted by me shows sinus rhythm. Ventricular premature complexes. Low voltage precordial leads. Heart rate is 87 bpm, SD interval is 185 ms, QRS durations 101 ms, QT is 371 QTC is 447 ms. No acute T wave inversions concerning for acute myocardial ischemia. No ST elevations concerning for acute myocardial infarction. RADIOLOGY:   Non-plain film images such as CT, Ultrasound and MRI are read by the radiologist. Plain radiographic images are visualized and preliminarily interpreted by the emergency physician with the below findings:      ? Radiologist's Report Reviewed:  XR CHEST PORTABLE   Final Result      Stable left-sided pneumothorax of approximately 20%                  XR CHEST PORTABLE   Final Result      Left sixth, seventh, and eighth rib fractures with associated small left-sided pneumothorax, with the apical pleural margin located 10 mm from the osseous apical margin. There is also a basilar component of the pneumothorax projecting over the left    costophrenic angle. Right lung is clear. Normal cardiomediastinal silhouette. Findings discussed with Dr. Jaqueline Angel in the ER on 6/1/22 at 66 91 21. CT Head WO Contrast   Final Result      Age-related changes in the brain without acute intracranial abnormality.                      ED BEDSIDE ULTRASOUND:   Performed by ED Physician - none    LABS:    I have reviewed and interpreted all of the currently available lab results from this visit (ifapplicable):  Results for orders placed or performed during the hospital encounter of 06/01/22   COVID-19, Rapid    Specimen: Nasopharyngeal Swab   Result Value Ref Range    SARS-CoV-2, NAAT Not Detected Not Detected   CBC with Auto Differential   Result Value Ref Range    WBC 9.0 4.0 - 11.0 K/uL    RBC 4.83 4.50 - 6.00 M/uL    Hemoglobin 14.5 13.0 - 18.0 g/dL    Hematocrit 42.5 40.0 - 54.0 %    MCV 88.0 80.0 - 100.0 fL    MCH 30.0 27.0 - 32.0 pg    MCHC 34.1 31.0 - 35.0 g/dL    RDW 14.4 11.0 - 16.0 %    Platelets 496 449 - 352 K/uL    MPV 9.2 6.0 - 10.0 fL    Neutrophils % 62.1 %    Immature Granulocytes % 0.7 0.0 - 5.0 %    Lymphocytes % 23.7 %    Monocytes % 7.2 %    Eosinophils % 5.7 %    Basophils % 0.6 %    Neutrophils Absolute 5.6 2.0 - 7.5 K/uL    Immature Granulocytes # 0.1 K/uL    Lymphocytes Absolute 2.1 1.5 - 4.0 K/uL    Monocytes Absolute 0.7 0.2 - 0.8 K/uL    Eosinophils Absolute 0.5 (H) 0.0 - 0.4 K/uL    Basophils Absolute 0.1 0.0 - 0.1 K/uL   Comprehensive Metabolic Panel w/ Reflex to MG   Result Value Ref Range    Sodium 139 136 - 145 mmol/L    Potassium reflex Magnesium 2.6 (LL) 3.4 - 5.1 mmol/L    Chloride 99 98 - 107 mmol/L    CO2 24 20 - 30 mmol/L    Anion Gap 16 3 - 16    Glucose 208 (H) 74 - 106 mg/dL    BUN 36 (H) 6 - 20 mg/dL    CREATININE 2.8 (H) 0.4 - 1.2 mg/dL    GFR Non-African American 22 (L) >59    GFR  26 (L) >59    Calcium 9.7 8.5 - 10.5 mg/dL    Total Protein 6.5 6.4 - 8.3 g/dL    Albumin 3.7 3.4 - 4.8 g/dL    Albumin/Globulin Ratio 1.3 0.8 - 2.0    Total Bilirubin 0.4 0.3 - 1.2 mg/dL    Alkaline Phosphatase 83 25 - 100 U/L    ALT <5 4 - 36 U/L    AST 7 (L) 8 - 33 U/L    Globulin 2.8 Not Established g/dL   Troponin   Result Value Ref Range    Troponin <0.30 <0.30 ng/mL   Brain Natriuretic Peptide   Result Value Ref Range    Pro- 0 - 1,800 pg/mL   Urinalysis with Reflex to Culture    Specimen: Urine   Result Value Ref Range    Color, UA Yellow Straw/Yellow    Clarity, UA Clear Clear    Glucose, Ur Negative Negative mg/dL    Bilirubin Urine Negative Negative    Ketones, Urine Negative Negative mg/dL    Specific Gravity, UA 1.015 1.005 - 1.030    Blood, Urine Negative Negative    pH, UA 5.0 5.0 - 8.0    Protein, UA 30 (A) Negative mg/dL    Urobilinogen, Urine 0.2 <2.0 E.U./dL    Nitrite, Urine Negative Negative    Leukocyte Esterase, Urine Negative Negative    Microscopic Examination YES     Urine Type NotGiven     Urine Reflex to Culture Not Indicated    Microscopic Urinalysis   Result Value Ref Range    Mucus, UA Rare (A) None Seen /LPF    WBC, UA 3-5 0 - 5 /HPF    RBC, UA 0-2 0 - 4 /HPF    Epithelial Cells, UA 2-5 0 - 5 /HPF    Amorphous, UA 1+ /HPF   Magnesium   Result Value Ref Range    Magnesium 1.7 1.7 - 2.4 mg/dL        All other labs were within normal range or not returned as of this dictation. EMERGENCY DEPARTMENT COURSE and DIFFERENTIAL DIAGNOSIS/MDM:   Vitals:    Vitals:    06/01/22 1730 06/01/22 1745 06/01/22 1800 06/01/22 1815   BP: 116/75 101/62 114/64    Pulse: 80 81 79 82   Resp: 19 17 24 23   Temp:       TempSrc:       SpO2: 98% 98% 99% 99%       MEDICATIONS ADMINISTERED IN ED:  Medications   0.9 % sodium chloride bolus (0 mLs IntraVENous Stopped 6/1/22 1520)   potassium chloride (KLOR-CON M) extended release tablet 40 mEq (40 mEq Oral Given 6/1/22 1558)       After initial evaluation examination I did have a conversation with the patient and his family about the upcoming plan, treatment possible disposition which they are agreeable to the times dictation. Advised we going give a small fluid bolus 500 mL for his blood pressure being slightly low at 88/67 which could be contributing possibly to the patient's lightheadedness dizziness and falls we will check to see what blood pressure medications that he takes because he could possibly need to adjust those. Patient will have CT scan of the head without contrast performed. We will also perform portable chest radiograph make sure there is no pneumonic type process which could be contributing the patient's condition especially with his shortness of breath and recent rib fracture.   Patient will have CBC, CMP, troponin, proBNP, UA and twelve-lead EKG performed. Patient's final disposition will determine once his radiological diagnostic studies been performed reviewed however they are wanting to attempt to try to get the patient admitted to swing bed for rehabilitation. Patient will also have a screening COVID test performed in case they do meet criteria for swing bed admission. Rapid COVID screen was    Blood work showed white count 9000, hemoglobin is 14.5, hematocrit to 42.5, platelet count is 570. Comprehensive metabolic panel was benign except for potassium of 2.6 which is low, glucose of 208, BUN of 36 and a creatinine of 2.8. Troponin was nondetectable less than 0.30. proBNP was negative at 250. Magnesium is low end of normal at 1.7. UA was negative. Microscopy showed rare mucus, 3-5 white cells, 0-2 red cells, 2-5 epithelial and +1 amorphous cells. No culture indicated. Portable chest radiograph showed fractures of the left sixth seventh and eighth ribs with associated small left-sided pneumothorax with apical pleural margin located 10 mm from the osseous apical margin. There is also a basilar component of the pneumothorax projecting over the left costophrenic angle. Right lung is clear. Normal cardiomediastinal silhouette. Repeat chest radiograph per radiology read by them as stable left-sided pneumothorax of approximately 20%. CT scan of the head without contrast read by radiology as age-related changes in the brain without acute intracranial abnormality. Radiological diagnostic studies discussed with him and his family they state understanding. I did discuss this hospitalist here they are agreeable to accept the patient for admission especially with his renal insufficiency and his falls but they still wanted us to discuss the case with surgery or pulmonology about the pneumothorax just for verification that it is stable and he does not need further evaluation for this.   They state if surgery is okay with this and does not think he requires evaluation and they are okay with admitting him here for further evaluation and work-up. This was discussed with Peterson Lozoya the on-call hospitalist.  Case will be discussed with on-call surgeon at [facility]. Case discussed with Dr. Karuna Sewell, surgeon on call [facility] advised that he really would not do anything for this at this time. He does not meet criteria for trauma alert activation. He states that all the studies now show that treating these conservatively he would not even have to place him on nonrebreather at this time because he has not had any hypoxia or true tachypnea he has not required any supplemental oxygen since the time of the initial injury he does believe this is stable and states that that he believes that he is okay to stay here for admission but advises that the only reason that any intervention would be done is if he became hypoxic and short of breath and he could possibly need a chest tube at that time but I do not foresee that since he has been stable since the time of the initial fractures and this was just found on repeat radiographs today. Case discussed with Peterson Lozoya and she is agreeable to accept the patient here for further evaluation work-up for multiple falls, renal insufficiency initially hypotension was responsive to fluid resuscitation. CONSULTS:  None    PROCEDURES:  Procedures    CRITICAL CARE TIME    Total Critical Care time was 0 minutes, excluding separately reportable procedures. There was a high probability of clinically significant/life threatening deterioration in the patient's condition which required my urgent intervention. FINAL IMPRESSION      1. Multiple falls    2. Hypotension, unspecified hypotension type    3. Acute renal insufficiency          DISPOSITION/PLAN   DISPOSITION        PATIENT REFERRED TO:  No follow-up provider specified.     DISCHARGE MEDICATIONS:  New Prescriptions No medications on file       Comment: Please note this report has been produced using speech recognition software and may contain errorsrelated to that system including errors in grammar, punctuation, and spelling, as well as words and phrases that may be inappropriate. If there are any questions or concerns please feel free to contact the dictating providerfor clarification.     Nydia Umana DO  Attending Emergency Physician              Nydia Umana DO  06/01/22 4460

## 2022-06-02 ENCOUNTER — APPOINTMENT (OUTPATIENT)
Dept: CT IMAGING | Facility: HOSPITAL | Age: 82
DRG: 200 | End: 2022-06-02
Payer: MEDICARE

## 2022-06-02 PROBLEM — J93.9 PNEUMOTHORAX ON LEFT: Status: ACTIVE | Noted: 2022-06-02

## 2022-06-02 PROBLEM — S22.42XA CLOSED FRACTURE OF MULTIPLE RIBS OF LEFT SIDE: Status: ACTIVE | Noted: 2022-06-02
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
## 2022-06-02 PROBLEM — R63.4 WEIGHT LOSS: Status: ACTIVE | Noted: 2022-06-02

## 2022-06-02 PROBLEM — M48.00 SPINAL STENOSIS: Status: ACTIVE | Noted: 2022-06-02

## 2022-06-02 PROBLEM — E88.09 HYPOALBUMINEMIA: Status: ACTIVE | Noted: 2022-06-02

## 2022-06-02 PROBLEM — I95.9 HYPOTENSION: Status: ACTIVE | Noted: 2022-06-02

## 2022-06-02 LAB
A/G RATIO: 1.2 (ref 0.8–2)
ALBUMIN SERPL-MCNC: 3.1 G/DL (ref 3.4–4.8)
ALP BLD-CCNC: 71 U/L (ref 25–100)
ALT SERPL-CCNC: <5 U/L (ref 4–36)
ANION GAP SERPL CALCULATED.3IONS-SCNC: 14 MMOL/L (ref 3–16)
AST SERPL-CCNC: 8 U/L (ref 8–33)
BASOPHILS ABSOLUTE: 0.1 K/UL (ref 0–0.1)
BASOPHILS RELATIVE PERCENT: 1 %
BILIRUB SERPL-MCNC: 0.4 MG/DL (ref 0.3–1.2)
BUN BLDV-MCNC: 33 MG/DL (ref 6–20)
CALCIUM SERPL-MCNC: 9.4 MG/DL (ref 8.5–10.5)
CHLORIDE BLD-SCNC: 106 MMOL/L (ref 98–107)
CO2: 22 MMOL/L (ref 20–30)
CREAT SERPL-MCNC: 2.3 MG/DL (ref 0.4–1.2)
EOSINOPHILS ABSOLUTE: 0.5 K/UL (ref 0–0.4)
EOSINOPHILS RELATIVE PERCENT: 8.3 %
GFR AFRICAN AMERICAN: 33
GFR NON-AFRICAN AMERICAN: 27
GLOBULIN: 2.5 G/DL
GLUCOSE BLD-MCNC: 135 MG/DL (ref 74–106)
GLUCOSE BLD-MCNC: 150 MG/DL (ref 74–106)
GLUCOSE BLD-MCNC: 151 MG/DL (ref 74–106)
GLUCOSE BLD-MCNC: 188 MG/DL (ref 74–106)
GLUCOSE BLD-MCNC: 223 MG/DL (ref 74–106)
HCT VFR BLD CALC: 38.5 % (ref 40–54)
HEMOGLOBIN: 13 G/DL (ref 13–18)
IMMATURE GRANULOCYTES #: 0 K/UL
IMMATURE GRANULOCYTES %: 0.5 % (ref 0–5)
LYMPHOCYTES ABSOLUTE: 2.1 K/UL (ref 1.5–4)
LYMPHOCYTES RELATIVE PERCENT: 33.5 %
MAGNESIUM: 1.7 MG/DL (ref 1.7–2.4)
MCH RBC QN AUTO: 29.6 PG (ref 27–32)
MCHC RBC AUTO-ENTMCNC: 33.8 G/DL (ref 31–35)
MCV RBC AUTO: 87.7 FL (ref 80–100)
MONOCYTES ABSOLUTE: 0.6 K/UL (ref 0.2–0.8)
MONOCYTES RELATIVE PERCENT: 9.4 %
NEUTROPHILS ABSOLUTE: 2.9 K/UL (ref 2–7.5)
NEUTROPHILS RELATIVE PERCENT: 47.3 %
PDW BLD-RTO: 14.6 % (ref 11–16)
PERFORMED ON: ABNORMAL
PLATELET # BLD: 294 K/UL (ref 150–400)
PMV BLD AUTO: 9.9 FL (ref 6–10)
POTASSIUM REFLEX MAGNESIUM: 2.7 MMOL/L (ref 3.4–5.1)
RBC # BLD: 4.39 M/UL (ref 4.5–6)
SODIUM BLD-SCNC: 142 MMOL/L (ref 136–145)
TOTAL PROTEIN: 5.6 G/DL (ref 6.4–8.3)
WBC # BLD: 6.2 K/UL (ref 4–11)

## 2022-06-02 PROCEDURE — 97165 OT EVAL LOW COMPLEX 30 MIN: CPT

## 2022-06-02 PROCEDURE — 97161 PT EVAL LOW COMPLEX 20 MIN: CPT

## 2022-06-02 PROCEDURE — 74176 CT ABD & PELVIS W/O CONTRAST: CPT

## 2022-06-02 PROCEDURE — 97530 THERAPEUTIC ACTIVITIES: CPT

## 2022-06-02 PROCEDURE — 1200000000 HC SEMI PRIVATE

## 2022-06-02 PROCEDURE — 2580000003 HC RX 258: Performed by: PHYSICIAN ASSISTANT

## 2022-06-02 PROCEDURE — 36415 COLL VENOUS BLD VENIPUNCTURE: CPT

## 2022-06-02 PROCEDURE — 85025 COMPLETE CBC W/AUTO DIFF WBC: CPT

## 2022-06-02 PROCEDURE — 99223 1ST HOSP IP/OBS HIGH 75: CPT | Performed by: INTERNAL MEDICINE

## 2022-06-02 PROCEDURE — 6360000002 HC RX W HCPCS: Performed by: PHYSICIAN ASSISTANT

## 2022-06-02 PROCEDURE — 97535 SELF CARE MNGMENT TRAINING: CPT

## 2022-06-02 PROCEDURE — 83735 ASSAY OF MAGNESIUM: CPT

## 2022-06-02 PROCEDURE — 92610 EVALUATE SWALLOWING FUNCTION: CPT

## 2022-06-02 PROCEDURE — 6360000002 HC RX W HCPCS: Performed by: INTERNAL MEDICINE

## 2022-06-02 PROCEDURE — 80053 COMPREHEN METABOLIC PANEL: CPT

## 2022-06-02 PROCEDURE — 6370000000 HC RX 637 (ALT 250 FOR IP): Performed by: PHYSICIAN ASSISTANT

## 2022-06-02 RX ORDER — SODIUM CHLORIDE AND POTASSIUM CHLORIDE .9; .15 G/100ML; G/100ML
SOLUTION INTRAVENOUS CONTINUOUS
Status: ACTIVE | OUTPATIENT
Start: 2022-06-02 | End: 2022-06-03

## 2022-06-02 RX ORDER — NICOTINE 21 MG/24HR
1 PATCH, TRANSDERMAL 24 HOURS TRANSDERMAL DAILY
Status: DISCONTINUED | OUTPATIENT
Start: 2022-06-02 | End: 2022-06-04 | Stop reason: HOSPADM

## 2022-06-02 RX ORDER — ENOXAPARIN SODIUM 100 MG/ML
30 INJECTION SUBCUTANEOUS NIGHTLY
Status: DISCONTINUED | OUTPATIENT
Start: 2022-06-02 | End: 2022-06-03

## 2022-06-02 RX ORDER — 0.9 % SODIUM CHLORIDE 0.9 %
500 INTRAVENOUS SOLUTION INTRAVENOUS ONCE
Status: COMPLETED | OUTPATIENT
Start: 2022-06-02 | End: 2022-06-02

## 2022-06-02 RX ORDER — POTASSIUM CHLORIDE 750 MG/1
40 CAPSULE, EXTENDED RELEASE ORAL ONCE
Status: COMPLETED | OUTPATIENT
Start: 2022-06-02 | End: 2022-06-02

## 2022-06-02 RX ORDER — MECOBALAMIN 5000 MCG
10 TABLET,DISINTEGRATING ORAL NIGHTLY
Status: DISCONTINUED | OUTPATIENT
Start: 2022-06-02 | End: 2022-06-04 | Stop reason: HOSPADM

## 2022-06-02 RX ADMIN — INSULIN LISPRO 1 UNITS: 100 INJECTION, SOLUTION INTRAVENOUS; SUBCUTANEOUS at 17:09

## 2022-06-02 RX ADMIN — METOPROLOL TARTRATE 25 MG: 25 TABLET, FILM COATED ORAL at 10:49

## 2022-06-02 RX ADMIN — CILOSTAZOL 100 MG: 100 TABLET ORAL at 08:09

## 2022-06-02 RX ADMIN — POTASSIUM CHLORIDE 40 MEQ: 10 CAPSULE, COATED, EXTENDED RELEASE ORAL at 08:27

## 2022-06-02 RX ADMIN — TAMSULOSIN HYDROCHLORIDE 0.4 MG: 0.4 CAPSULE ORAL at 08:07

## 2022-06-02 RX ADMIN — POTASSIUM CHLORIDE AND SODIUM CHLORIDE: 900; 150 INJECTION, SOLUTION INTRAVENOUS at 09:43

## 2022-06-02 RX ADMIN — ROSUVASTATIN 20 MG: 20 TABLET, FILM COATED ORAL at 08:08

## 2022-06-02 RX ADMIN — ENOXAPARIN SODIUM 30 MG: 100 INJECTION SUBCUTANEOUS at 21:28

## 2022-06-02 RX ADMIN — LEVOTHYROXINE SODIUM 50 MCG: 0.05 TABLET ORAL at 05:47

## 2022-06-02 RX ADMIN — INSULIN LISPRO 2 UNITS: 100 INJECTION, SOLUTION INTRAVENOUS; SUBCUTANEOUS at 11:12

## 2022-06-02 RX ADMIN — POTASSIUM CHLORIDE AND SODIUM CHLORIDE: 900; 150 INJECTION, SOLUTION INTRAVENOUS at 20:33

## 2022-06-02 RX ADMIN — CILOSTAZOL 100 MG: 100 TABLET ORAL at 21:27

## 2022-06-02 RX ADMIN — Medication 10 MG: at 01:48

## 2022-06-02 RX ADMIN — ASPIRIN 81 MG 81 MG: 81 TABLET ORAL at 08:08

## 2022-06-02 RX ADMIN — METOPROLOL TARTRATE 12.5 MG: 25 TABLET, FILM COATED ORAL at 21:28

## 2022-06-02 RX ADMIN — Medication 10 MG: at 21:27

## 2022-06-02 RX ADMIN — ALOGLIPTIN 6.25 MG: 12.5 TABLET, FILM COATED ORAL at 08:10

## 2022-06-02 RX ADMIN — SODIUM CHLORIDE 500 ML: 9 INJECTION, SOLUTION INTRAVENOUS at 08:12

## 2022-06-02 RX ADMIN — PANTOPRAZOLE SODIUM 40 MG: 40 TABLET, DELAYED RELEASE ORAL at 05:47

## 2022-06-02 RX ADMIN — INSULIN LISPRO 1 UNITS: 100 INJECTION, SOLUTION INTRAVENOUS; SUBCUTANEOUS at 21:37

## 2022-06-02 ASSESSMENT — PAIN SCALES - GENERAL: PAINLEVEL_OUTOF10: 0

## 2022-06-02 NOTE — PROGRESS NOTES
Pt arrived to floor from ED via strtcher. Patient able to transfer from stretcher to bed independently. Multiple bruises and skin tears on both upper extremities. Band-aid applied to right elbow. Vitals taken and recorded. Head to toe assessment done. Appropriate meds administered. HOB elevated. Call light with in reach.

## 2022-06-02 NOTE — H&P
History and Physical    Patient:  Domenica Tipton    CHIEF COMPLAINT:    Falls, weakness, dizziness     HISTORY OF PRESENT ILLNESS:   The patient is a 81 y.o. male with PMH of HTN, hypothyroidism, stroke, CKD, PVD, tobacco abuse who presents due to multiple falls and generalized weakness. Patient reports anytime he gets up he gets dizzy and sometimes falls. Does not lose consciousness. Has a walker but thinks it makes it even harder for him to get around. In April he had a fall which resulted in multiple left rib fractures. States he has had pain from that. Has difficulty walking due to weakness. States appetite stable. Admitted to weight loss. On arrival to ER noted to be hypokalemic, hypomagnesemic, hypotensive, and in acute renal failure. CXR with left rib fracures and 20% left pneumothorax. O2 sat 99% on RA and no SOA. Admitted for generalized weakness, dehydration, acute renal failure, hypotension, electrolyte abnormalities. Past Medical History:      Diagnosis Date    Cataract     DM [facility]) [facility]     Erectile dysfunction     Hard of hearing     hearing aid    High cholesterol     Hypertension     Hypothyroidism     Peripheral vascular disease ([facility]     Pneumonia     Stroke [facility])        Past Surgical History:      Procedure Laterality Date    CYST REMOVAL      under right arm     EYE SURGERY  01/2012    cataract    FOOT SURGERY      growth removed; left       Medications Prior to Admission:    Prior to Admission medications    Medication Sig Start Date End Date Taking?  Authorizing Provider   amLODIPine (NORVASC) 5 MG tablet  4/7/22   Historical Provider, MD   aspirin 81 MG chewable tablet Take 1 tablet by mouth daily 4/7/22   MORRIS Griffin   hydroCHLOROthiazide (HYDRODIURIL) 25 MG tablet Take 0.5 tablets by mouth daily 4/6/22   MORRIS Griffin   metoprolol tartrate (LOPRESSOR) 25 MG tablet Take 1 tablet by mouth 2 times daily 4/6/22   MORRIS Griffin   vitamin D (ERGOCALCIFEROL) 1.25 MG (55310 UT) CAPS capsule Take 1 capsule by mouth once a week 4/7/22   MORRIS Haney   SITagliptin (JANUVIA) 50 MG tablet Take 2 tablets by mouth daily 4/6/22 5/10/22  MORRIS Haney   losartan (COZAAR) 100 MG tablet Take 100 mg by mouth daily    Historical Provider, MD   furosemide (LASIX) 20 MG tablet Take 20 mg by mouth daily    Historical Provider, MD   tamsulosin (FLOMAX) 0.4 MG capsule Take 1 capsule by mouth daily 2/4/22   MORRIS Parrish   levothyroxine (SYNTHROID) 50 MCG tablet take 1 tablet by mouth once daily 2/3/22   MORRIS Parrish   rosuvastatin (CRESTOR) 20 MG tablet Take 20 mg by mouth daily    Historical Provider, MD   omeprazole (PRILOSEC) 20 MG delayed release capsule Take 20 mg by mouth daily    Historical Provider, MD   cilostazol (PLETAL) 100 MG tablet Take 1 tablet by mouth 2 times daily. 5/31/12   Kayla Gama MD       Allergies:  Patient has no known allergies. Social History:   TOBACCO:   reports that he has been smoking cigarettes. He has a 53.00 pack-year smoking history. He has never used smokeless tobacco.  ETOH:   reports no history of alcohol use. OCCUPATION:  None     Family History:       Problem Relation Age of Onset    Stroke Mother     Heart Disease Daughter         MI    Stroke Daughter        Review of system  Constitutional:  Denies fever or chills. Admits to generalized weakness and multiple falls. Eyes:  Denies eye pain or redness  HENT:  Denies nasal congestion or sore throat   Respiratory:  Denies cough or shortness of breath   Cardiovascular:  Denies chest pain or edema   GI:  Denies nausea, vomiting, bloody stools or diarrhea. Admits to abdominal pain. :  Denies dysuria or frequency  Musculoskeletal:  Denies acute neck pain or body aches  Integument:  Denies rash or itching  Neurologic:  Denies headache, numbness, tingling or unilateral weakness. Admits to dizziness.   Psychiatric:  Denies acute depression or acute anxiety      Vital Signs  Temp: 97.7 °F (36.5 °C)  Heart Rate: 75  Resp: 18  BP: (!) 104/49  SpO2: 98 %  O2 Device: None (Room air)       vital signs reviewed in electronic chart. Physical exam  Constitutional:  Well developed, elderly gentleman sitting up in bed, no acute distress  Eyes:  PERRL, no scleral icterus, conjunctiva normal   HENT:  Atraumatic, external ears normal, nose normal, oropharynx moist, no pharyngeal exudates. Neck- supple, no JVD, no lymphadenopathy  Respiratory:  No respiratory distress on room air, no wheezing, rales or rhonchi detected  Cardiovascular:  Normal rate, normal rhythm, no murmurs, no gallops, no rubs, no edema   GI:  Soft, nondistended, normal bowel sounds, nontender, no voluntary guarding  Musculoskeletal:  No cyanosis or obvious acute deformity. Moving all extremities with generalized weakness throughout. Integument:  Warm and dry. Neurologic:  Alert & oriented x 3, no apparent focal deficits noted   Psychiatric:  Speech and behavior appropriate       Lab Results   Component Value Date     06/02/2022    K 2.7 (LL) 06/02/2022     06/02/2022    CO2 22 06/02/2022    BUN 33 (H) 06/02/2022    CREATININE 2.3 (H) 06/02/2022    GLUCOSE 150 (H) 06/02/2022    CALCIUM 9.4 06/02/2022    PROT 5.6 (L) 06/02/2022    LABALBU 3.1 (L) 06/02/2022    BILITOT 0.4 06/02/2022    ALKPHOS 71 06/02/2022    AST 8 06/02/2022    ALT <5 06/02/2022    LABGLOM 27 (L) 06/02/2022    GFRAA 33 (L) 06/02/2022    AGRATIO 1.2 06/02/2022    GLOB 2.5 06/02/2022           Lab Results   Component Value Date    WBC 6.2 06/02/2022    HGB 13.0 06/02/2022    HCT 38.5 (L) 06/02/2022    MCV 87.7 06/02/2022     06/02/2022       CT ABDOMEN PELVIS WO CONTRAST Additional Contrast? None   Final Result   Addendum 1 of 1     ADDENDUM #1     This addendum is performed at the request of the ordering provider for    additional comments to be provided about the lumbar spine.  The exam is    otherwise not comprehensively reviewed for the purposes of this addendum. There is slight left convex curvature of the lumbar spine. Standard    lumbosacral morphology with 5 nonrib-bearing lumbar vertebral bodies. Vertebral body heights are maintained. Transverse processes are intact. No    evidence of acute fracture involving the    lumbar spine. Mild multilevel degenerative endplate spurring. There is evidence of    multilevel disc bulges most pronounced at L4-L5 with calcified disc bulge    which results in severe central stenosis at this level. There is    multilevel facet arthrosis. Moderate    neuroforaminal narrowing greatest at L4-L5 and L5-S1. In addition to the impression provided with the original report, pertinent    findings related to the lumbar spine include:      1. No acute traumatic abnormality involving the lumbar spine. 2.  Lumbar spondylosis with calcified disc bulge at L4-5 causing severe    central stenosis. Final      Small left-sided hydropneumothorax, grossly similar in size to yesterday's chest radiographs are      Associated multiple posterior lateral lower left-sided rib fractures. No acute or suspicious intra-abdominal process. Right renal cyst.      Mild prostatomegaly. XR CHEST PORTABLE   Final Result      Stable left-sided pneumothorax of approximately 20%                  XR CHEST PORTABLE   Final Result      Left sixth, seventh, and eighth rib fractures with associated small left-sided pneumothorax, with the apical pleural margin located 10 mm from the osseous apical margin. There is also a basilar component of the pneumothorax projecting over the left    costophrenic angle. Right lung is clear. Normal cardiomediastinal silhouette. Findings discussed with  [facility] in the ER on 6/1/22 at [phone]. CT Head WO Contrast   Final Result      Age-related changes in the brain without acute intracranial abnormality. Assessment and Plan     Active Hospital Problems    Diagnosis Date Noted   [facility] [F25. XXXA]  - in setting of hypotension, suspected dehydration, advanced age  - pt/ot   - fall precautions  - ck wnl   - hydrate with ivfs and provide supportive care    06/02/2022    Pneumothorax on left [J93.9]  - 6/1 serial CXRs with 20% pneumothorax   - ER Dr. Dario Nair discussed with [facility] surgery Dr. Rosa Nicole who recommends conservative management especially since he is not soa or hypoxic. Monitor closely. 06/02/2022    Closed fracture of multiple ribs of left side [S22.42XA]  - tylenol as needed for pain  - patient states pain mostly resolved at this point    06/02/2022    Hypotension [I95.9]  - hold home hctz, lasix, losartan, amlodipine  - hydrate with IVFs  - patient with orthostatic hypotension requiring boluses  - decrease metoprolol dosing    - continue to monitor    06/02/2022    Weight loss [R63.4]  - patient with ongoing weight loss over last several months he reports this is unintentional  - ct chest jan 2022 with pneumonia otherwise no acute finding   - with abd pain and weight loss obtained ct abd/pelv 6/2 without acute finding   - check psa   - dietitian following   - could consider upper/lower endoscopy as op  - monitor    06/02/2022    Hypoalbuminemia [E88.09]  - encourage po intake and monitor    06/02/2022    Spinal stenosis [M48.00]  - CT imaging with mild multilevel degenerative endplate spurring. Evidence of multilevel disc bulges most pronounced at L4-L5 with calcified disc bulge resulting in severe central stenosis at this level. - consider ortho spine as outpatient if patient agreeable   - pt/ot consult    06/02/2022    Hypokalemia [E87.6]  - monitor nad replace as needed   04/05/2022    Personal history of nicotine dependence [Z87.891]  - Patient was strongly encouraged to stop smoking. Risks of continued smoking and benefits of smoking cessation were discussed.    - nicotine patch ordered 04/05/2022    History of stroke [Z86.73]  - continue current regimen   - f/u with neuro as scheduled    04/05/2022    CKD (chronic kidney disease) stage 3, GFR 30-59 ml/min ([facility]) [N18.30]   - see below  02/01/2022    Acute kidney injury superimposed on CKD ([facility] [N17.9, N18.9]  - Baseline CR ~ 1.2-1.5 per chart review and on arrival to ER 6/1 Cr 2.8   - suspect related to hypotension, dehydration   - hold hctz, losartan, lasix, almodipine   - hydrate with IVFs   - monitor    02/01/2022    Generalized weakness [R53.1]  - suspect multifactorial in setting of advanced age, multiple chronic conditions, spinal stenosis, hypotension, orthostatic hypotension, acute renal failure, dehydration, electrolyte abnormalities   - b12, folate, vit d, tsh wnl 5/10/22  - treat as outlined here  - pt/ot consult - consider swing bed  For subacute rehab if indicated   02/01/2022    Hypothyroidism [E03.9]  - continue current regimen    03/28/2011    Diabetes mellitus, type II ([facility] [E11.9]  - A1C 7.9 May 2022   - monitor fsbg achs and cover with ssi as needed   - continue nesina   - diabetic diet    03/28/2011    Benign essential HTN [I10]  - see under hypotension    03/28/2011     Patient was seen and examined with Dr. Sean Guzman. After reviewing patient data and diagnostic testing the plan of care was established in conjunction with Dr. Sean Guzman.     MORRIS Mathis certifies per CMS regulation for 42 CFR [phone]), that the patient may reasonably be expected to be discharged or transferred to a hospital within 96 hours after admission to [address] [place] [address]    Electronically signed by MORRIS Mathis on 6/2/2022 at 4:47 PM

## 2022-06-02 NOTE — PROGRESS NOTES
Speech Language Pathology  Facility/Department: [facility] SURG   CLINICAL BEDSIDE SWALLOW EVALUATION    NAME: Ridge Mayorga  :   MRN: [id number]    ADMISSION DATE: 2022  ADMITTING DIAGNOSIS: has Hypothyroidism; Diabetes mellitus, type II ([address] Hyperlipidemia; Benign essential HTN; Acute hypoxemic respiratory failure due to COVID-19 [facility]); Vitamin D deficiency; CKD (chronic kidney disease) stage 3, GFR 30-59 ml/min ([address] Acute kidney injury superimposed on CKD ([address] Thrombocytopenia ([address] Generalized weakness; Acute encephalopathy; Diarrhea; Hypokalemia; Speech problem; Peripheral vascular disease ([facility] EKG, abnormal; History of stroke; Personal history of nicotine dependence; Falls; Pneumothorax on left; Closed fracture of multiple ribs of left side; Hypotension; and Weight loss on their problem list.  ONSET DATE: 2022    Recent Chest Xray/CT of Chest: (2022)     Impression       Stable left-sided pneumothorax of approximately 20%     Date of Eval: 2022  Evaluating Therapist: VISHNU Garcia    Current Diet level:  Current Diet : Regular    Primary Complaint  Patient Complaint: Patient reported being admitted due to weakness, however, feels better today and \"walked good with PT\". Pain:  None reported or indicated    Reason for Referral  Ridge Mayorga was referred for a bedside swallow evaluation to assess the efficiency of his swallow function, identify signs and symptoms of aspiration and make recommendations regarding safe dietary consistencies, effective compensatory strategies, and safe eating environment. Impression  Dysphagia Diagnosis: Swallow function appears WFL  Dysphagia Impression : No overt s/sx dysphagia indicated. Dysphagia Outcome Severity Scale: Level 6: Within functional limits/Modified independence     Treatment Plan  Requires SLP Intervention: No  Duration of Treatment: N/A - Skilled ST services not indicated at this time.   D/C Recommendations: No follow up therapy recommended post discharge     Recommended Diet and Intervention  Regular diet texture, thin liquids; Skilled ST services not indicated at this time. Recommended Form of Meds: PO     Compensatory Swallowing Strategies  Compensatory Swallowing Strategies : Alternate solids and liquids;Eat/Feed slowly;Upright as possible for all oral intake;Remain upright for 30-45 minutes after meals;Small bites/sips    Treatment/Goals  Short-term Goals  Timeframe for Short-term Goals: N/A - Skilled ST services not indicated at this time. Long-term Goals  Timeframe for Long-term Goals: N/A - Skilled ST services not indicated at this time. General  Chart Reviewed: Yes  Comments: Patient has upper and lower dentures, however, prefers to eat without them in. Subjective  Subjective: Patient upright in bed, pleasant and agreeable to ST eval. Patient alert and Ox4, denies difficulty chewing or swallowing. Patient reports having dentures, but states he prefers to eat without them as they \"make it harder to eat\". Behavior/Cognition: Alert; Cooperative;Pleasant mood  Follows Directions: Simple  Dentition: Edentulous  Patient Positioning: Upright in bed  Baseline Vocal Quality: Normal  Volitional Cough: Strong  Prior Dysphagia History: BSE completed at this facility during previous admission on 04/05/2022; per report, no overt s/sx dysphagia indicated. Consistencies Administered: Regular; Thin - straw    Vision/Hearing  Vision  Vision Exceptions: Wears glasses at all times (Doesn't have them in hospital)  Hearing  Hearing: Exceptions to [facility]  Hearing Exceptions: No hearing aid;Hard of hearing/hearing concerns    Oral Motor Deficits  Oral/Motor  Oral Hygiene: Moist  Gag: No Impairment    Oral Phase Dysfunction  Oral Phase  Oral Phase: WFL  Oral Phase  Oral Phase - Comment: Patient consumed trials of regular diet texture with thin liquids via straw.  Patient demonstrated throrough mastication d/t being edentulous, however, cleared bolus without difficulty. Indicators of Pharyngeal Phase Dysfunction   Pharyngeal Phase   Pharyngeal Phase: No deficits indicated. Prognosis  Individuals consulted  Consulted and agree with results and recommendations: Patient    Education  Patient Education: Aspiration precaution guidelines; safe swallow strategies. Patient Education Response: Verbalizes understanding;Demonstrated understanding  Safety Devices in place: Yes  Type of devices: All fall risk precautions in place; Left in bed;Call light within reach; Patient at risk for falls; Bed alarm in place       Therapy Time  SLP Individual Minutes  Time In: 3776  Time Out: 7505  Minutes: Leslie [address] [place] [address]  6/2/2022 5:00 PM

## 2022-06-02 NOTE — ACP (ADVANCE CARE PLANNING)
Advance Care Planning     General Advance Care Planning (ACP) Conversation    Date of Conversation: 6/2/22  Conducted with: Patient with Decision Making Capacity    Healthcare Decision Maker:    Primary Decision Maker: [place] - Spouse - [phone]    Secondary Decision Maker: [place] - Pilar - [phone]  Click here to complete Healthcare Decision Makers including selection of the Healthcare Decision Maker Relationship (ie \"Primary\"). Today we documented Decision Maker(s) consistent with Legal Next of Kin hierarchy.     Content/Action Overview:  Has NO ACP documents/care preferences - information provided, considering goals and options  Reviewed DNR/DNI and patient elects DNR order - referred to ACP Clinical Specialist & placed order      Length of Voluntary ACP Conversation in minutes:  <16 minutes (Non-Billable)    Eamon Hussein LPCA

## 2022-06-02 NOTE — PROGRESS NOTES
Medication reconciliation completed with med list from [facility]. I removed Metoprolol Succinate because patient is taking Tartrate.

## 2022-06-02 NOTE — PROGRESS NOTES
Occupational Therapy  Facility/Department: [facility]  Occupational Therapy Initial Assessment    Name: Kt Myers  : [id number]  MRN: [id number]  Date of Service: 2022    Discharge Recommendations:  Outpatient OT  OT Equipment Recommendations  Equipment Needed: No       Patient Diagnosis(es): The primary encounter diagnosis was Multiple falls. Diagnoses of Hypotension, unspecified hypotension type and Acute renal insufficiency were also pertinent to this visit. Past Medical History:  has a past medical history of Cataract, DM (diabetes mellitus) ([address] Erectile dysfunction, Hard of hearing, High cholesterol, Hypertension, Hypothyroidism, Peripheral vascular disease ([address] Pneumonia, and Stroke ([facility] Past Surgical History:  has a past surgical history that includes cyst removal; Foot surgery; and eye surgery (2012). Assessment   Performance deficits / Impairments: Decreased functional mobility ; Decreased endurance;Decreased strength;Decreased ADL status; Decreased safe awareness;Decreased high-level IADLs;Decreased balance  Assessment: Pt agreeable to OT services. This 81year old male was referred to OT services upon recent admission following several falls at home. Pt presents with MMT of BUE 4-/5. Pt demo ability to complete bed mobility without assistance. Pt sat unsupported at EOB and donned socks without difficulty. Pt ambulated 350 feet with RW with SBA<>CGA . Pt fatigued with task. Pt returned to room and declined need to toilet. Pt declined to sit upright in chair and transferred to supine with MOD I. Pt presents with deficits in balance and ADL's. Pt will benefit from skilled OT services while IP.   Prognosis: Good  Decision Making: Low Complexity  REQUIRES OT FOLLOW-UP: Yes  Activity Tolerance  Activity Tolerance: Patient limited by fatigue        Plan   Plan  Times per Week: 3-5  Times per Day: Daily  Plan Weeks: 1  Current Treatment Recommendations: Maricel Kraft training,Functional mobility training,Endurance training,Patient/Caregiver education & training,Self-Care / ADL,Safety education & training     Restrictions  Restrictions/Precautions  Restrictions/Precautions: Fall Risk,General Precautions  Required Braces or Orthoses?: No    Subjective   General  Chart Reviewed: Yes  Patient assessed for rehabilitation services?: Yes  Family / Caregiver Present: No  Referring Practitioner: Natalie Cheadle, PA  Diagnosis: Acute renal failure  Subjective  Subjective: My blood pressure has been off and it makes me dizzy and I have had a lot of falls. I can feel it coming on but I can't do anything about it.      Social/Functional History  Social/Functional History  Lives With: Spouse  Type of Home: House  Home Layout: Two level,Performs ADL's on one level,Able to Live on Main level with bedroom/bathroom  Home Access: Stairs to enter with rails  Entrance Stairs - Number of Steps: ~10  Bathroom Shower/Tub: Walk-in shower  Bathroom Toilet: Handicap height  Bathroom Equipment: 3-in-1 commode,Grab bars in shower,Grab bars around toilet  Bathroom Accessibility: Walker accessible  Home Equipment: Cane,Crutches,Walker, rolling  Has the patient had two or more falls in the past year or any fall with injury in the past year?: Yes  Receives Help From: Home health (Therapy and nursing)  ADL Assistance: [facility]: Independent  Homemaking Responsibilities: Yes  Ambulation Assistance: Independent (uses walker)  Transfer Assistance: Independent  Active : Yes (Not in a month)       Objective   Heart Rate: 66  BP: 110/62  BP Location: Right upper arm  BP Method: Manual  MAP (Calculated): 78  Resp: 18  SpO2: 95 %  Vision Exceptions: Wears glasses at all times (Doesn't have them in hospital)  Hearing: Exceptions to [facility]  Hearing Exceptions: No hearing aid;Hard of hearing/hearing concerns       Observation/Palpation  Observation: Pt lying in bed, NAD, room air, pleasant and cooperative. Bed Mobility Training  Bed Mobility Training: No  Transfer Training  Transfer Training: Yes  Overall Level of Assistance: Stand-by assistance  Interventions: Verbal cues; Safety awareness training  Sit to Stand: Stand-by assistance  Stand to Sit: Stand-by assistance  Stand Pivot Transfers: Stand-by assistance;Contact-guard assistance  Bed to Chair: Stand-by assistance  Gait  Overall Level of Assistance: Stand-by assistance;Contact-guard assistance  Interventions: Safety awareness training  Distance (ft): 350 Feet  Assistive Device: Walker, rolling        ADL  Grooming: Setup  UE Dressing: Independent  LE Dressing: Supervision  Toileting:  (Pt declined to toilet)        Bed mobility  Rolling to Left: Modified independent  Supine to Sit: Modified independent  Sit to Supine: Modified independent  Scooting: Modified independent  Transfers  Stand Pivot Transfers: Stand by assistance;Contact guard assistance  Sit to stand: Stand by assistance  Stand to sit: Stand by assistance                        Education Given To: Patient  Education Provided: Transfer Training  Education Method: Verbal  Barriers to Learning: None  Education Outcome: Verbalized understanding  LUE AROM (degrees)  LUE AROM : WFL  RUE AROM (degrees)  RUE AROM : WFL       Goals  Short Term Goals  Time Frame for Short term goals: 1 week  Short Term Goal 1: pt to complete shower with MOD I. Short Term Goal 2: Pt to complete functional reaching with MOD I. Short Term Goal 3: pt to tolerate x10 minutes of activity with PRE <4/10. Short Term Goal 4: Pt to complete dressing with MOD I. Therapy Time   Individual Concurrent Group Co-treatment   Time In 1036         Time Out 2443         Minutes 38              This note serves as a DC summary in the event of pt discharge.    Dennise Yates OTR/L

## 2022-06-02 NOTE — CARE COORDINATION
Lives With: Spouse  Type of Home: House  Home Layout: Two level,Performs ADL's on one level,Able to Live on Main level with bedroom/bathroom  Home Access: Stairs to enter with rails  Entrance Stairs - Number of Steps: ~10  Bathroom Shower/Tub: Walk-in shower  Bathroom Toilet: Handicap height  Bathroom Equipment: 3-in-1 commode,Grab bars in shower,Grab bars around toilet  Bathroom Accessibility: Walker accessible  Home Equipment: Cane,Crutches,Walker, rolling  Has the patient had two or more falls in the past year or any fall with injury in the past year?: Yes  Receives Help From: Home health (Therapy and nursing)  ADL Assistance: [address]: Independent  Homemaking Responsibilities: Yes  Ambulation Assistance: Independent (uses walker)  Transfer Assistance: Independent  Active : Yes (Not in a month)    Lives with SO. Was I at baseline. Has BSC, cane, crutches, RW. Has [facility] at home - news new order at [facility] ([phone]).

## 2022-06-02 NOTE — PROGRESS NOTES
Physical Therapy  Facility/Department: [facility]  Physical Therapy Initial Assessment    Name: Tian Suazo  : [birth year]  MRN: [id number]  Date of Service: 2022    Discharge Recommendations:  Continue to assess pending progress,Home with assist PRN          Patient Diagnosis(es): The primary encounter diagnosis was Multiple falls. Diagnoses of Hypotension, unspecified hypotension type, Acute renal insufficiency, Fall, sequela, and Pain were also pertinent to this visit. Past Medical History:  has a past medical history of Cataract, DM (diabetes mellitus) ([facility] [address] Erectile dysfunction, Hard of hearing, High cholesterol, Hypertension, Hypothyroidism, Peripheral vascular disease ([facility] [address] Pneumonia, and Stroke ([facility] [address] Past Surgical History:  has a past surgical history that includes cyst removal; Foot surgery; and eye surgery (2012). Assessment   Body Structures, Functions, Activity Limitations Requiring Skilled Therapeutic Intervention: Decreased functional mobility ; Decreased endurance;Decreased balance;Decreased high-level IADLs  Assessment: Pt will benefit from skilled PT while in the acute setting to address mobility and endurance to allow for return to optimal functional level upon d/c home.   Treatment Diagnosis: functional decline  Therapy Prognosis: Good  Decision Making: Low Complexity  Requires PT Follow-Up: Yes  Activity Tolerance  Activity Tolerance: Patient tolerated treatment well     Plan   Plan  Plan: 3-5 times per week  Plan weeks: 3 days  Current Treatment Recommendations: Strengthening,ROM,Balance training,Functional mobility training,Endurance training,Safety education & training,Patient/Caregiver education & training,Equipment evaluation, education, & procurement  Safety Devices  Type of Devices: Call light within reach,Left in bed  Restraints  Restraints Initially in Place: No     Restrictions  Restrictions/Precautions  Restrictions/Precautions: Fall Risk,General Precautions  Required Braces or Orthoses?: No     Subjective   General  Chart Reviewed: Yes  Patient assessed for rehabilitation services?: Yes  Family / Caregiver Present: No  Referring Practitioner: Vasyl Mustafa PA-C  Referral Date : 06/01/22  Diagnosis: Acute renal failure  Subjective  Subjective: Pt presents supine in bed, he has just returned from radiology. He is pleasant and agreeable to PT evaluation. Social/Functional History  Social/Functional History  Lives With: Spouse  Type of Home: House  Home Layout: Two level,Performs ADL's on one level,Able to Live on Main level with bedroom/bathroom  Home Access: Stairs to enter with rails  Entrance Stairs - Number of Steps: ~10  Bathroom Shower/Tub: Walk-in shower  Bathroom Toilet: Handicap height  Bathroom Equipment: 3-in-1 commode,Grab bars in shower,Grab bars around toilet  Bathroom Accessibility: Walker accessible  Home Equipment: Cane,Crutches,Walker, rolling  Has the patient had two or more falls in the past year or any fall with injury in the past year?: Yes  Receives Help From: Home health (therapy and nursing)  ADL Assistance: [address] [place] [address] Avenue: Independent  Homemaking Responsibilities: Yes  Ambulation Assistance: Independent (uses walker)  Transfer Assistance: Independent  Active : Yes (not in a month)     Vision/Hearing  Vision  Vision: Impaired  Vision Exceptions: Wears glasses at all times (Doesn't have them in hospital)  Hearing  Hearing: Exceptions to [facility]  Hearing Exceptions: No hearing aid;Hard of hearing/hearing concerns      Cognition   Orientation  Overall Orientation Status: Impaired  Orientation Level: Oriented to place;Oriented to situation;Oriented to person     Objective   Heart Rate: 75  BP: (!) 104/49  BP Location: Right upper arm  BP Method: Manual  MAP (Calculated): 67.33  Resp: 18  SpO2: 98 %     Observation/Palpation  Observation: Pt lying in bed, NAD, room air, pleasant and cooperative.   Luz Maria Schulte Assessment  AROM: Within functional limits  Strength: Within functional limits  Coordination: Within functional limits               Bed Mobility Training  Bed Mobility Training: No  Transfer Training  Transfer Training: Yes  Overall Level of Assistance: Stand-by assistance  Interventions: Verbal cues; Safety awareness training  Sit to Stand: Stand-by assistance  Stand to Sit: Stand-by assistance  Stand Pivot Transfers: Stand-by assistance;Contact-guard assistance  Bed to Chair: Stand-by assistance  Gait  Overall Level of Assistance: Stand-by assistance;Contact-guard assistance  Interventions: Safety awareness training  Distance (ft): 350 Feet  Assistive Device: Walker, rolling     Bed mobility  Rolling to Left: Modified independent  Supine to Sit: Modified independent  Sit to Supine: Modified independent  Scooting: Modified independent     Transfers  Sit to Stand: Stand by assistance  Stand to sit: Stand by assistance     Ambulation  Device: Rolling Walker  Assistance: Stand by assistance  Gait Deviations: Slow Ariela  Distance: 50' in room     Balance  Sitting - Static: Good  Sitting - Dynamic: Good  Standing - Static: Good  Standing - Dynamic: Good;- (w/ walker)           Goals  Short Term Goals  Time Frame for Short term goals: 3 days  Short term goal 1: Pt to ambulate 250 feet with RW x SBA with good safety awareness  Short term goal 2: Pt to demonstrate good safety awareness with all functional mobility and transfers. Therapy Time   Individual Concurrent Group Co-treatment   Time In 8082         Time Out 0055         Minutes 9            This note serves as D/C summary if patient is discharged prior to next visit.     Francoise Angelucci, PT

## 2022-06-02 NOTE — PLAN OF CARE
Problem: Discharge Planning  Goal: Discharge to home or other facility with appropriate resources  Outcome: Progressing  Flowsheets (Taken 6/1/2022 2229)  Discharge to home or other facility with appropriate resources:   Identify barriers to discharge with patient and caregiver   Identify discharge learning needs (meds, wound care, etc)   Refer to discharge planning if patient needs post-hospital services based on physician order or complex needs related to functional status, cognitive ability or social support system   Arrange for needed discharge resources and transportation as appropriate   Arrange for interpreters to assist at discharge as needed     Problem: Safety - Adult  Goal: Free from fall injury  Outcome: Progressing

## 2022-06-02 NOTE — FLOWSHEET NOTE
06/02/22 0900   Assessment   Charting Type Shift assessment   Psychosocial   Psychosocial (WDL) WDL   Neurological   Neuro (WDL) WDL   Level of Consciousness Alert (0)   [place] Coma Scale   Eye Opening 4   Best Verbal Response 5   Best Motor Response 6   [place] Coma Scale Score 15   HEENT (Head, Ears, Eyes, Nose, & Throat)   Right Eye Impaired vision   Left Eye Impaired vision   Right Ear Impaired hearing   Left Ear Impaired hearing   Teeth Dentures upper;Dentures lower   Respiratory   Respiratory (WDL) WDL   Gastrointestinal   Abdominal (WDL) X   Abdomen Inspection Rounded; Soft   RUQ Bowel Sounds Active   LUQ Bowel Sounds Active   RLQ Bowel Sounds Active   LLQ Bowel Sounds Active   Tenderness Nontender   Peripheral Vascular   Peripheral Vascular (WDL) WDL   Skin Integumentary    Skin Integumentary (WDL) X   Skin Color Pink   Skin Condition/Temp Dry; Warm   Skin Integrity Ecchymosis   Location bue   Skin Integrity Site 2   Skin Integrity Location 2 Tear  (healing)   Location 2 right knee   Preventative Dressing No   Musculoskeletal   Musculoskeletal (WDL) X

## 2022-06-03 PROBLEM — E83.42 HYPOMAGNESEMIA: Status: ACTIVE | Noted: 2022-06-03

## 2022-06-03 LAB
A/G RATIO: 1.2 (ref 0.8–2)
ALBUMIN SERPL-MCNC: 3.1 G/DL (ref 3.4–4.8)
ALP BLD-CCNC: 74 U/L (ref 25–100)
ALT SERPL-CCNC: <5 U/L (ref 4–36)
ANION GAP SERPL CALCULATED.3IONS-SCNC: 13 MMOL/L (ref 3–16)
AST SERPL-CCNC: 8 U/L (ref 8–33)
BILIRUB SERPL-MCNC: 0.4 MG/DL (ref 0.3–1.2)
BUN BLDV-MCNC: 22 MG/DL (ref 6–20)
CALCIUM SERPL-MCNC: 9.4 MG/DL (ref 8.5–10.5)
CHLORIDE BLD-SCNC: 110 MMOL/L (ref 98–107)
CO2: 21 MMOL/L (ref 20–30)
CREAT SERPL-MCNC: 1.7 MG/DL (ref 0.4–1.2)
GFR AFRICAN AMERICAN: 47
GFR NON-AFRICAN AMERICAN: 39
GLOBULIN: 2.6 G/DL
GLUCOSE BLD-MCNC: 103 MG/DL (ref 74–106)
GLUCOSE BLD-MCNC: 113 MG/DL (ref 74–106)
GLUCOSE BLD-MCNC: 198 MG/DL (ref 74–106)
GLUCOSE BLD-MCNC: 286 MG/DL (ref 74–106)
GLUCOSE BLD-MCNC: 340 MG/DL (ref 74–106)
HCT VFR BLD CALC: 40.7 % (ref 40–54)
HEMOGLOBIN: 13.1 G/DL (ref 13–18)
MAGNESIUM: 1.6 MG/DL (ref 1.7–2.4)
MCH RBC QN AUTO: 29 PG (ref 27–32)
MCHC RBC AUTO-ENTMCNC: 32.2 G/DL (ref 31–35)
MCV RBC AUTO: 90 FL (ref 80–100)
PDW BLD-RTO: 14.7 % (ref 11–16)
PERFORMED ON: ABNORMAL
PERFORMED ON: NORMAL
PLATELET # BLD: 298 K/UL (ref 150–400)
PMV BLD AUTO: 9.8 FL (ref 6–10)
POTASSIUM SERPL-SCNC: 3.3 MMOL/L (ref 3.4–5.1)
PROSTATE SPECIFIC ANTIGEN: 1.43 NG/ML (ref 0–4)
RBC # BLD: 4.52 M/UL (ref 4.5–6)
SODIUM BLD-SCNC: 144 MMOL/L (ref 136–145)
TOTAL PROTEIN: 5.7 G/DL (ref 6.4–8.3)
WBC # BLD: 6 K/UL (ref 4–11)

## 2022-06-03 PROCEDURE — 1200000000 HC SEMI PRIVATE

## 2022-06-03 PROCEDURE — 83735 ASSAY OF MAGNESIUM: CPT

## 2022-06-03 PROCEDURE — 97530 THERAPEUTIC ACTIVITIES: CPT

## 2022-06-03 PROCEDURE — 36415 COLL VENOUS BLD VENIPUNCTURE: CPT

## 2022-06-03 PROCEDURE — 85027 COMPLETE CBC AUTOMATED: CPT

## 2022-06-03 PROCEDURE — 84153 ASSAY OF PSA TOTAL: CPT

## 2022-06-03 PROCEDURE — 2580000003 HC RX 258: Performed by: PHYSICIAN ASSISTANT

## 2022-06-03 PROCEDURE — 80053 COMPREHEN METABOLIC PANEL: CPT

## 2022-06-03 PROCEDURE — 97110 THERAPEUTIC EXERCISES: CPT

## 2022-06-03 PROCEDURE — 6370000000 HC RX 637 (ALT 250 FOR IP): Performed by: PHYSICIAN ASSISTANT

## 2022-06-03 PROCEDURE — 99232 SBSQ HOSP IP/OBS MODERATE 35: CPT | Performed by: INTERNAL MEDICINE

## 2022-06-03 PROCEDURE — 6360000002 HC RX W HCPCS: Performed by: PHYSICIAN ASSISTANT

## 2022-06-03 PROCEDURE — 97802 MEDICAL NUTRITION INDIV IN: CPT

## 2022-06-03 RX ORDER — ENOXAPARIN SODIUM 100 MG/ML
40 INJECTION SUBCUTANEOUS NIGHTLY
Status: DISCONTINUED | OUTPATIENT
Start: 2022-06-03 | End: 2022-06-04 | Stop reason: HOSPADM

## 2022-06-03 RX ORDER — BACITRACIN, NEOMYCIN, POLYMYXIN B 400; 3.5; 5 [USP'U]/G; MG/G; [USP'U]/G
OINTMENT TOPICAL
Status: DISCONTINUED
Start: 2022-06-03 | End: 2022-06-03

## 2022-06-03 RX ORDER — SODIUM CHLORIDE AND POTASSIUM CHLORIDE .9; .15 G/100ML; G/100ML
SOLUTION INTRAVENOUS CONTINUOUS
Status: ACTIVE | OUTPATIENT
Start: 2022-06-03 | End: 2022-06-04

## 2022-06-03 RX ORDER — METHYLPREDNISOLONE SODIUM SUCCINATE 40 MG/ML
40 INJECTION, POWDER, LYOPHILIZED, FOR SOLUTION INTRAMUSCULAR; INTRAVENOUS ONCE
Status: COMPLETED | OUTPATIENT
Start: 2022-06-03 | End: 2022-06-03

## 2022-06-03 RX ORDER — MAGNESIUM SULFATE IN WATER 40 MG/ML
2000 INJECTION, SOLUTION INTRAVENOUS ONCE
Status: COMPLETED | OUTPATIENT
Start: 2022-06-03 | End: 2022-06-03

## 2022-06-03 RX ORDER — 0.9 % SODIUM CHLORIDE 0.9 %
500 INTRAVENOUS SOLUTION INTRAVENOUS ONCE
Status: COMPLETED | OUTPATIENT
Start: 2022-06-03 | End: 2022-06-03

## 2022-06-03 RX ORDER — M-VIT,TX,IRON,MINS/CALC/FOLIC 27MG-0.4MG
1 TABLET ORAL DAILY
Status: DISCONTINUED | OUTPATIENT
Start: 2022-06-03 | End: 2022-06-04 | Stop reason: HOSPADM

## 2022-06-03 RX ORDER — BACITRACIN, NEOMYCIN, POLYMYXIN B 400; 3.5; 5 [USP'U]/G; MG/G; [USP'U]/G
OINTMENT TOPICAL 2 TIMES DAILY
Status: DISCONTINUED | OUTPATIENT
Start: 2022-06-03 | End: 2022-06-04 | Stop reason: HOSPADM

## 2022-06-03 RX ORDER — POTASSIUM CHLORIDE 750 MG/1
40 CAPSULE, EXTENDED RELEASE ORAL ONCE
Status: COMPLETED | OUTPATIENT
Start: 2022-06-03 | End: 2022-06-03

## 2022-06-03 RX ADMIN — ROSUVASTATIN 20 MG: 20 TABLET, FILM COATED ORAL at 08:37

## 2022-06-03 RX ADMIN — Medication 10 MG: at 20:27

## 2022-06-03 RX ADMIN — CILOSTAZOL 100 MG: 100 TABLET ORAL at 08:37

## 2022-06-03 RX ADMIN — INSULIN LISPRO 4 UNITS: 100 INJECTION, SOLUTION INTRAVENOUS; SUBCUTANEOUS at 17:11

## 2022-06-03 RX ADMIN — METOPROLOL TARTRATE 12.5 MG: 25 TABLET, FILM COATED ORAL at 20:27

## 2022-06-03 RX ADMIN — MAGNESIUM SULFATE HEPTAHYDRATE 2000 MG: 40 INJECTION, SOLUTION INTRAVENOUS at 09:16

## 2022-06-03 RX ADMIN — BACITRACIN ZINC, NEOMYCIN, POLYMYXIN B: 400; 3.5; 5 OINTMENT TOPICAL at 20:26

## 2022-06-03 RX ADMIN — INSULIN LISPRO 2 UNITS: 100 INJECTION, SOLUTION INTRAVENOUS; SUBCUTANEOUS at 20:27

## 2022-06-03 RX ADMIN — PANTOPRAZOLE SODIUM 40 MG: 40 TABLET, DELAYED RELEASE ORAL at 06:11

## 2022-06-03 RX ADMIN — POTASSIUM CHLORIDE 40 MEQ: 10 CAPSULE, COATED, EXTENDED RELEASE ORAL at 08:37

## 2022-06-03 RX ADMIN — LEVOTHYROXINE SODIUM 50 MCG: 0.05 TABLET ORAL at 06:11

## 2022-06-03 RX ADMIN — INSULIN LISPRO 1 UNITS: 100 INJECTION, SOLUTION INTRAVENOUS; SUBCUTANEOUS at 11:11

## 2022-06-03 RX ADMIN — TAMSULOSIN HYDROCHLORIDE 0.4 MG: 0.4 CAPSULE ORAL at 08:37

## 2022-06-03 RX ADMIN — BACITRACIN ZINC, NEOMYCIN, POLYMYXIN B: 400; 3.5; 5 OINTMENT TOPICAL at 15:16

## 2022-06-03 RX ADMIN — METHYLPREDNISOLONE SODIUM SUCCINATE 40 MG: 40 INJECTION, POWDER, FOR SOLUTION INTRAMUSCULAR; INTRAVENOUS at 08:38

## 2022-06-03 RX ADMIN — ASPIRIN 81 MG 81 MG: 81 TABLET ORAL at 08:37

## 2022-06-03 RX ADMIN — METOPROLOL TARTRATE 12.5 MG: 25 TABLET, FILM COATED ORAL at 08:37

## 2022-06-03 RX ADMIN — SODIUM CHLORIDE 500 ML: 9 INJECTION, SOLUTION INTRAVENOUS at 15:12

## 2022-06-03 RX ADMIN — Medication 1 TABLET: at 09:07

## 2022-06-03 RX ADMIN — CILOSTAZOL 100 MG: 100 TABLET ORAL at 20:27

## 2022-06-03 RX ADMIN — ALOGLIPTIN 6.25 MG: 12.5 TABLET, FILM COATED ORAL at 08:37

## 2022-06-03 RX ADMIN — ENOXAPARIN SODIUM 40 MG: 100 INJECTION SUBCUTANEOUS at 20:27

## 2022-06-03 NOTE — PROGRESS NOTES
Progress Note      Subjective:   Chief complaint: falls, weakness, dizziness     Interval History:    sitting up in bed this morning. States he feels better today than he has in a long time. When he is getting up to walk he feels stronger. Hopes to go home soon. Does continue ot have some dizziness with standing. Review of systems:   Constitutional:  Denies fever or chills. Admits to generalized weakness and multiple falls. Eyes:  Denies eye pain or redness  HENT:  Denies nasal congestion or sore throat   Respiratory:  Denies cough or shortness of breath   Cardiovascular:  Denies chest pain or edema   GI:  Denies nausea, vomiting, bloody stools or diarrhea, abd pain. :  Denies dysuria or frequency  Musculoskeletal:  Denies acute neck pain or body aches. Positive for chronic back pain   Integument:  Denies rash or itching  Neurologic:  Denies headache, numbness, tingling or unilateral weakness. Admits to dizziness. Psychiatric:  Denies acute depression or acute anxiety    Past medical history, surgical history, family history and social history reviewed and unchanged compared to H&P earlier this admission.     Medications:   Scheduled Meds:   multivitamin  1 tablet Oral Daily    enoxaparin  40 mg SubCUTAneous Nightly    neomycin-bacitracin-polymyxin   Topical BID    nicotine  1 patch TransDERmal Daily    melatonin  10 mg Oral Nightly    metoprolol tartrate  12.5 mg Oral BID    [Held by provider] amLODIPine  5 mg Oral Daily    aspirin  81 mg Oral Daily    cilostazol  100 mg Oral BID    [Held by provider] furosemide  20 mg Oral Daily    [Held by provider] hydroCHLOROthiazide  12.5 mg Oral Daily    levothyroxine  50 mcg Oral Daily    [Held by provider] losartan  100 mg Oral Daily    pantoprazole  40 mg Oral QAM AC    rosuvastatin  20 mg Oral Daily    alogliptin  6.25 mg Oral Daily    tamsulosin  0.4 mg Oral Daily    insulin lispro  0-6 Units SubCUTAneous TID WC    insulin lispro  0-3 Units SubCUTAneous Nightly     Continuous Infusions:   0.9% NaCl with KCl 20 mEq      dextrose         Objective:     Vital Signs  Temp: 97.5 °F (36.4 °C)  Heart Rate: 67  Resp: 18  BP: 133/66  SpO2: 96 %  O2 Device: None (Room air)       Vital signs reviewed in electronic charts. Physical exam  Constitutional:  Well developed, elderly gentleman sitting up in bed, no acute distress  Eyes:  PERRL, no scleral icterus, conjunctiva normal   HENT:  Atraumatic, external ears normal, nose normal, oropharynx moist, no pharyngeal exudates. Neck- supple, no JVD, no lymphadenopathy  Respiratory:  No respiratory distress on room air, no wheezing, rales or rhonchi detected  Cardiovascular:  Normal rate, normal rhythm, no murmurs, no gallops, no rubs, no edema   GI:  Soft, nondistended, normal bowel sounds, nontender, no voluntary guarding  Musculoskeletal:  No cyanosis or obvious acute deformity. Moving all extremities with generalized weakness throughout. Integument:  Warm and dry. Neurologic:  Alert & oriented x 3, no apparent focal deficits noted   Psychiatric:  Speech and behavior appropriate        Results:     Lab Results   Component Value Date    WBC 6.0 06/03/2022    HGB 13.1 06/03/2022    HCT 40.7 06/03/2022    MCV 90.0 06/03/2022     06/03/2022       Lab Results   Component Value Date     06/03/2022    K 3.3 06/03/2022    K 2.7 06/02/2022     06/03/2022    CO2 21 06/03/2022    BUN 22 06/03/2022    CREATININE 1.7 06/03/2022    GLUCOSE 113 06/03/2022    CALCIUM 9.4 06/03/2022        Assessment and Plan:     [address] [W19. XXXA]  - in setting of hypotension, suspected dehydration, advanced age  - pt/ot   - fall precautions  - ck wnl   - hydrate with ivfs and provide supportive care   - patient ambulating 300 ft with standby assist and per insurance/pt/ot not candidate for swing bed.  Family notified over the phone and agreeable to dc home this weekend if remaining stable.     06/02/2022    Pneumothorax on left [J93.9]  - 6/1 serial CXRs with 20% pneumothorax   - ER Dr. Whitney Rausch discussed with [facility] surgery Dr. Lucio Rick who recommends conservative management especially since he is not soa or hypoxic. Monitor closely.     06/02/2022    Closed fracture of multiple ribs of left side [S22.42XA]  - tylenol as needed for pain  - patient states pain mostly resolved at this point     06/02/2022    Hypotension [I95.9]  - hold home hctz, lasix, losartan, amlodipine. Decreased metoprolol.   - hydrate with IVFs  - patient with orthostatic hypotension and hydrating with IVFs as below   - continue to monitor     06/02/2022    Weight loss [R63.4]  - patient with ongoing weight loss over last several months he reports this is unintentional  - ct chest jan 2022 with pneumonia otherwise no acute finding   - with abd pain and weight loss obtained ct abd/pelv 6/2 without acute finding   - psa wnl   - add mvi and ensure   - dietitian consulted   - could consider upper/lower endoscopy as op  - monitor     06/02/2022    Hypoalbuminemia [E88.09]  - encourage po intake and monitor     06/02/2022    Spinal stenosis [M48.00]  - CT imaging with mild multilevel degenerative endplate spurring. Evidence of multilevel disc bulges most pronounced at L4-L5 with calcified disc bulge resulting in severe central stenosis at this level. -  ortho spine as outpatient if patient agreeable   - pt/ot consult   - solumedrol 40 6/3    06/02/2022    Hypokalemia [E87.6]  - monitor nad replace as needed    04/05/2022    Personal history of nicotine dependence [Z87.891]  - Patient was strongly encouraged to stop smoking. Risks of continued smoking and benefits of smoking cessation were discussed.    - nicotine patch ordered    04/05/2022    History of stroke [Z86.73]  - continue current regimen   - f/u with neuro as scheduled     04/05/2022    CKD (chronic kidney disease) stage 3, GFR 30-59 ml/min ([facility]) [N18.30]   - see below  02/01/2022    Acute kidney injury superimposed on CKD ([facility] [address] [N17.9, N18.9]  - Baseline CR ~ 1.2-1.5 per chart review and on arrival to ER 6/1 Cr 2.8   - suspect related to hypotension, dehydration   - hold hctz, losartan, lasix, almodipine   - hydrate with IVFs   - gradually improving with Cr 1/7 on 6/3  - monitor     02/01/2022    Generalized weakness [R53.1]  - suspect multifactorial in setting of advanced age, multiple chronic conditions, spinal stenosis, hypotension, orthostatic hypotension, acute renal failure, dehydration, electrolyte abnormalities   - b12, folate, vit d, tsh wnl 5/10/22  - treat as outlined here  - pt/ot consulted and plan for dc home with [facility] when medically stable     02/01/2022    Hypothyroidism [E03.9]  - continue current regimen     03/28/2011    Diabetes mellitus, type II ([facility] [address] [E11.9]  - A1C 7.9 May 2022   - monitor fsbg achs and cover with ssi as needed   - continue nesina   - diabetic diet     03/28/2011    Benign essential HTN [I10]  - see under hypotension     03/28/2011   · Hypomagnesemia   - monitor and replace as needed    Discussed case with patient's daughter over the phone. Made her aware that he will likely be ready for dc over the weekend with [facility]. They do not want nursing home referrals at this time. They plan to provide 24 hour care for him. Mentioned life alert necklace or device similar to this for home use but they state this would not work for him. Discussed importance of him using walker and they express understanding stating he's very stubborn and doesn't want to use it but they will continue to encourage him. Patient was seen and examined with Dr. Henrry Ellison. After reviewing patient data and diagnostic testing the plan of care was established in conjunction with Dr. Henrry Ellison.     Electronically signed by MORRIS Escobar on 6/3/2022 at 2:33 PM

## 2022-06-03 NOTE — PROGRESS NOTES
Occupational Therapy  Facility/Department: [facility]  Occupational Therapy Daily Note    Name: Kerrie Hernandez  :   MRN: [id number]  Date of Service: 6/3/2022    Discharge Recommendations:  Outpatient OT          Patient Diagnosis(es): The primary encounter diagnosis was Multiple falls. Diagnoses of Hypotension, unspecified hypotension type, Acute renal insufficiency, Fall, sequela, and Pain were also pertinent to this visit. Past Medical History:  has a past medical history of Cataract, DM (diabetes mellitus) ([address] Erectile dysfunction, Hard of hearing, High cholesterol, Hypertension, Hypothyroidism, Peripheral vascular disease ([address] Pneumonia, and Stroke ([facility] [address] Past Surgical History:  has a past surgical history that includes cyst removal; Foot surgery; and eye surgery (2012). Assessment   Assessment: Pt seen for OT services co tx with PTA. Pt on room air hooked up to IV. Pt ambulated to sink with SBA. Pt stood at sink and completed shaving and combing hair with SBA. No LOB noted. Pt tolerated standing ~10 minutes no fatigue reported. Pt returned to recliner and LEACH with lunch tray. OT returned for BID session on this date and provided pt with HEP and orange theraband. Pt tolerated seated ther ex in recliner x10-15 reps with min rest breaks. Pt left seated upright with call light in reach.            Plan   Plan  Times per Week: 3-5  Times per Day: Daily  Plan Weeks: 1  Current Treatment Recommendations: Strengthening,Balance training,Functional mobility training,Endurance training,Patient/Caregiver education & training,Self-Care / ADL,Safety education & training     Restrictions  Restrictions/Precautions  Restrictions/Precautions: Fall Risk,General Precautions  Required Braces or Orthoses?: No    Subjective   General  Chart Reviewed: Yes  Patient assessed for rehabilitation services?: Yes  Family / Caregiver Present: No  Referring Practitioner: MORRIS Elizabeth  Diagnosis: Acute renal failure  Subjective  Subjective: My blood pressure has been off and it makes me dizzy and I have had a lot of falls. I can feel it coming on but I can't do anything about it. Social/Functional History  Social/Functional History  Lives With: Spouse  Type of Home: House  Home Layout: Two level,Performs ADL's on one level,Able to Live on Main level with bedroom/bathroom  Home Access: Stairs to enter with rails  Entrance Stairs - Number of Steps: ~10  Bathroom Shower/Tub: Walk-in shower  Bathroom Toilet: Handicap height  Bathroom Equipment: 3-in-1 commode,Grab bars in shower,Grab bars around toilet  Bathroom Accessibility: Walker accessible  Home Equipment: Cane,Crutches,Walker, rolling  Has the patient had two or more falls in the past year or any fall with injury in the past year?: Yes  Receives Help From: Home health (therapy and nursing)  ADL Assistance: [address] [place] [address] Avenue: Independent  Homemaking Responsibilities: Yes  Ambulation Assistance: Independent (uses walker)  Transfer Assistance: Independent  Active : Yes (not in a month)       Objective   Heart Rate: 67  BP: 133/66  MAP (Calculated): 88.33  Resp: 18  SpO2: 96 %  O2 Device: None (Room air)     ADL  Grooming: Stand by assistance;Setup      Resistive Exercises: Orange theraband x10 shoulder flexion, retraction, x15 elbow flexion, extension, horizontal ABD,  Pressure Relief Exercises: x5 sit to stand transfers    Goals  Short Term Goals  Time Frame for Short term goals: 1 week  Short Term Goal 1: pt to complete shower with MOD I. Short Term Goal 2: Pt to complete functional reaching with MOD I. Short Term Goal 3: pt to tolerate x10 minutes of activity with PRE <4/10. Short Term Goal 4: Pt to complete dressing with MOD I.        Therapy Time   Individual Concurrent Group Co-treatment   Time In 9700      112         Time Out 1146      137         Minutes 18           35              This note serves as a DC summary in the event of pt discharge.      Dennise Yates, OTR/L

## 2022-06-03 NOTE — FLOWSHEET NOTE
06/03/22 0902   Assessment   Charting Type Shift assessment   Psychosocial   Psychosocial (WDL) WDL   Neurological   Neuro (WDL) WDL   Level of Consciousness Alert (0)   [place] Coma Scale   Eye Opening 4   Best Verbal Response 5   Best Motor Response 6   [place] Coma Scale Score 15   HEENT (Head, Ears, Eyes, Nose, & Throat)   Right Eye Impaired vision   Left Eye Impaired vision   Right Ear Impaired hearing   Left Ear Impaired hearing   Teeth Dentures upper;Dentures lower   Respiratory   Respiratory (WDL) WDL   Gastrointestinal   Abdominal (WDL) X   Abdomen Inspection Rounded; Soft   RUQ Bowel Sounds Active   LUQ Bowel Sounds Active   RLQ Bowel Sounds Active   LLQ Bowel Sounds Active   Tenderness Nontender   Peripheral Vascular   Peripheral Vascular (WDL) WDL   Skin Integumentary    Skin Integumentary (WDL) X   Skin Color Pink   Skin Condition/Temp Dry; Warm   Skin Integrity Ecchymosis   Location bue   Skin Integrity Site 2   Skin Integrity Location 2 Tear   Location 2 rt knee   Preventative Dressing No   Musculoskeletal   Musculoskeletal (WDL) X   RL Extremity Weakness; Unsteady   LL Extremity Weakness; Unsteady

## 2022-06-03 NOTE — CONSULTS
Comprehensive Nutrition Assessment    Type and Reason for Visit:  Initial,Consult    Nutrition Recommendations/Plan:   1. Modify diet to include 4 carb choices  2. Continue with Glucerna BID and continue with MVI. 3. Encourage intakes and compliance with diet. Malnutrition Assessment:  Malnutrition Status:  Insufficient data (06/03/22 1312)    Context:  Acute Illness     Findings of the 6 clinical characteristics of malnutrition:  Energy Intake:  Mild decrease in energy intake (Comment)  Weight Loss:  Unable to assess   Patient has lost weight this month but could not verify if related to fluid or not eating or combination. Will continue to monitor intakes. Body Fat Loss:  Unable to assess     Muscle Mass Loss:  Unable to assess    Fluid Accumulation:  Unable to assess     Strength:       Nutrition Assessment:    Pt admitted with acute kidney injury superimposed on CKD and is considered moderate risk r/t poor intakes for 5 days or more, weight fluctuating. Nutrition Related Findings:    Pt presents normal weight, weight loss. Lasix is now on hold and no edema reported. Pt has PMH: T2D, CKD3, hypokalemia, PVD, CVA. MVI ordered. labs: k-3.3; BUN-22; CR1.7; GFR-39; Mg-1.6; Glu-113-198. Wound Type: None       Current Nutrition Intake & Therapies:    Average Meal Intake: 26-50%,51-75% (46-70% x past 5 meals)  Average Supplements Intake: Unable to assess  ADULT ORAL NUTRITION SUPPLEMENT; Breakfast, Lunch; Diabetic Oral Supplement  ADULT DIET; Regular; 4 carb choices (60 gm/meal)    Anthropometric Measures:  Height: 5' 8\" (172.7 cm)  Ideal Body Weight (IBW): 154 lbs (70 kg)       Current Body Weight: 161 lb 11.2 oz (73.3 kg), 105 % IBW.  Weight Source: Bed Scale  Current BMI (kg/m2): 24.6  Usual Body Weight: 177 lb (80.3 kg) (average)  % Weight Change (Calculated): -8.6                    BMI Categories: Normal Weight (BMI 22.0 to 24.9) age over 72    Estimated Daily Nutrient Needs:  Energy Requirements Based On: Kcal/kg  Weight Used for Energy Requirements: Current  Energy (kcal/day): 0937-4576  Weight Used for Protein Requirements: Current  Protein (g/day): 58-73 (0.8-1 gm/kg cbw)  Method Used for Fluid Requirements: Standard Renal  Fluid (ml/day): 500 ml plus output    Nutrition Diagnosis:   · Predicted inadequate energy intake related to cognitive or neurological impairment,inadequate protein-energy intake,renal dysfunction as evidenced by intake 26-50%,intake 51-75%,poor intake prior to admission,weight loss,weight loss greater than or equal to 5% in 1 month      Nutrition Interventions:   Food and/or Nutrient Delivery: Modify Current Diet,Start Oral Nutrition Supplement,Mineral Supplement,Vitamin Supplement  Nutrition Education/Counseling: Education initiated (Pt kept getting off subject, not sure if hearing what is being said or not understanding.)  Coordination of Nutrition Care: Continue to monitor while inpatient  Plan of Care discussed with: Patient    Goals:     Goals: Meet at least 75% of estimated needs       Nutrition Monitoring and Evaluation:      Food/Nutrient Intake Outcomes: Food and Nutrient Intake,Supplement Intake  Physical Signs/Symptoms Outcomes: Biochemical Data,Fluid Status or Edema,Weight    Discharge Planning:     Too soon to determine     Bradley Mayorga, MS, RD, LD

## 2022-06-03 NOTE — CARE COORDINATION
CM spoke with family (wife and daughter) with PA on phone. Updated on POC and goals. Plan is to DC to home in AM as swingbed is not going to be approved. All questions answered. Agreeable to [facility] at home. Orders for home health sent to Gale Cervantes (pt's provider). No DME needs noted at this time. Family was educated to have pt use walker at home. All questions answered.

## 2022-06-03 NOTE — FLOWSHEET NOTE
06/02/22 2237   Assessment   Charting Type Shift assessment   Psychosocial   Psychosocial (WDL) WDL   Neurological   Neuro (WDL) WDL   Level of Consciousness Alert (0)   Swallow Screening   Is the patient unable to remain alert for testing? No   [place] Coma Scale   Eye Opening 4   Best Verbal Response 5   Best Motor Response 6   [place] Coma Scale Score 15   NIHSS Stroke Scale   NIHSS Stroke Scale Assessed No   HEENT (Head, Ears, Eyes, Nose, & Throat)   Right Eye Impaired vision   Left Eye Impaired vision   Right Ear Impaired hearing   Left Ear Impaired hearing   Teeth Dentures upper;Dentures lower   Respiratory   Respiratory (WDL) WDL   Rhythm Interpretation   Heart Rate 67   Gastrointestinal   Abdominal (WDL) X   Abdomen Inspection Rounded; Soft   Last BM (including prior to admit) 06/01/22   RUQ Bowel Sounds Active   LUQ Bowel Sounds Active   RLQ Bowel Sounds Active   LLQ Bowel Sounds Active   Tenderness Nontender   Peripheral Vascular   Peripheral Vascular (WDL) WDL   Skin Integumentary    Skin Integumentary (WDL) X   Skin Color Pink   Skin Condition/Temp Warm;Dry   Skin Integrity Ecchymosis   Location BUE   Multiple Skin Integrity Sites Yes   Skin Integrity Site 2   Skin Integrity Location 2 Tear   Location 2 right knee   Preventative Dressing No   Care Plan - Skin/Tissue Integrity Goals   Skin Integrity Remains Intact Monitor for areas of redness and/or skin breakdown   Musculoskeletal   Musculoskeletal (WDL) X   RL Extremity Weakness; Unsteady   LL Extremity Weakness; Unsteady   Care Plan - Chronic Conditions and Co-Morbidity   Care Plan - Patient's Chronic Conditions and Co-Morbidity Symptoms are Monitored and Maintained or Improved Monitor and assess patient's chronic conditions and comorbid symptoms for stability, deterioration, or improvement   Care Plan - Discharge Planning Goals   Discharge to home or other facility with appropriate resources Identify barriers to discharge with patient and caregiver;Arrange for needed discharge resources and transportation as appropriate; Identify discharge learning needs (meds, wound care, etc)

## 2022-06-03 NOTE — PROGRESS NOTES
Physical Therapy  Facility/Department: [address] [place] [address] MED SURG  Daily Treatment Note  NAME: Ino Carmona  :   MRN: [id number]    Date of Service: 6/3/2022    Discharge Recommendations:  Continue to assess pending progress,Home with assist PRN      Patient Diagnosis(es): The primary encounter diagnosis was Multiple falls. Diagnoses of Hypotension, unspecified hypotension type, Acute renal insufficiency, Fall, sequela, and Pain were also pertinent to this visit. Assessment   Assessment: Patient completed B LE therex in supine. Partial cotreatment with OT. Patient transitioned to sitting with Mod I.  Stood with SBA and ambulated ~15 feet with RW to the sink for shaving. Patient ambulated to the recliner and was set up for lunch. Activity Tolerance: Patient tolerated treatment well     Plan    Plan  Plan: 3-5 times per week  Current Treatment Recommendations: Strengthening;ROM;Balance training;Functional mobility training; Endurance training; Safety education & training;Patient/Caregiver education & training;Equipment evaluation, education, & procurement     Restrictions  Restrictions/Precautions  Restrictions/Precautions: Fall Risk,General Precautions  Required Braces or Orthoses?: No     Subjective    Subjective  Subjective: Patient presents awake in bed, wife visiting. States he would like to get out of bed and hopes to go home soon. Objective     Bed Mobility Training  Bed Mobility Training: Yes  Overall Level of Assistance: Modified independent  Rolling: Modified independent  Supine to Sit: Modified independent  Scooting: Modified independent  Transfer Training  Transfer Training: Yes  Overall Level of Assistance: Stand-by assistance  Interventions: Verbal cues; Safety awareness training  Sit to Stand: Stand-by assistance  Stand to Sit: Stand-by assistance  Stand Pivot Transfers: Stand-by assistance  Bed to Chair: Stand-by assistance  Gait Training  Gait Training: Yes  Gait  Overall Level of Assistance: Stand-by assistance  Interventions: Safety awareness training  Distance (ft): 15 Feet (15 feet x 2 to/from bathroom)  Assistive Device: Walker, rolling     PT Exercises  Exercise Treatment: B LE therex in sitting     Safety Devices  Type of Devices: Left in chair;Call light within reach     Goals  Short Term Goals  Time Frame for Short term goals: 3 days  Short term goal 1: Pt to ambulate 250 feet with RW x SBA with good safety awareness  Short term goal 2: Pt to demonstrate good safety awareness with all functional mobility and transfers. Therapy Time   Individual Concurrent Group Co-treatment   Time In 9388         Time Out 1146         Minutes 23              This note serves as D/C summary if patient is discharged prior to next visit.   Andie Sandoval, PTA

## 2022-06-04 ENCOUNTER — APPOINTMENT (OUTPATIENT)
Dept: CT IMAGING | Facility: HOSPITAL | Age: 82
DRG: 200 | End: 2022-06-04
Payer: MEDICARE

## 2022-06-04 VITALS
TEMPERATURE: 97.7 F | SYSTOLIC BLOOD PRESSURE: 155 MMHG | DIASTOLIC BLOOD PRESSURE: 76 MMHG | WEIGHT: 161.6 LBS | BODY MASS INDEX: 24.49 KG/M2 | HEART RATE: 104 BPM | HEIGHT: 68 IN | RESPIRATION RATE: 18 BRPM | OXYGEN SATURATION: 93 %

## 2022-06-04 LAB
A/G RATIO: 1.2 (ref 0.8–2)
ALBUMIN SERPL-MCNC: 3 G/DL (ref 3.4–4.8)
ALP BLD-CCNC: 73 U/L (ref 25–100)
ALT SERPL-CCNC: <5 U/L (ref 4–36)
ANION GAP SERPL CALCULATED.3IONS-SCNC: 9 MMOL/L (ref 3–16)
AST SERPL-CCNC: 9 U/L (ref 8–33)
BILIRUB SERPL-MCNC: 0.3 MG/DL (ref 0.3–1.2)
BUN BLDV-MCNC: 15 MG/DL (ref 6–20)
CALCIUM SERPL-MCNC: 9.6 MG/DL (ref 8.5–10.5)
CHLORIDE BLD-SCNC: 107 MMOL/L (ref 98–107)
CO2: 20 MMOL/L (ref 20–30)
CREAT SERPL-MCNC: 1.4 MG/DL (ref 0.4–1.2)
GFR AFRICAN AMERICAN: 59
GFR NON-AFRICAN AMERICAN: 49
GLOBULIN: 2.5 G/DL
GLUCOSE BLD-MCNC: 136 MG/DL (ref 74–106)
GLUCOSE BLD-MCNC: 161 MG/DL (ref 74–106)
GLUCOSE BLD-MCNC: 184 MG/DL (ref 74–106)
HCT VFR BLD CALC: 39.6 % (ref 40–54)
HEMOGLOBIN: 12.7 G/DL (ref 13–18)
MAGNESIUM: 2 MG/DL (ref 1.7–2.4)
MCH RBC QN AUTO: 29 PG (ref 27–32)
MCHC RBC AUTO-ENTMCNC: 32.1 G/DL (ref 31–35)
MCV RBC AUTO: 90.4 FL (ref 80–100)
PDW BLD-RTO: 14.5 % (ref 11–16)
PERFORMED ON: ABNORMAL
PERFORMED ON: ABNORMAL
PLATELET # BLD: 292 K/UL (ref 150–400)
PMV BLD AUTO: 9.3 FL (ref 6–10)
POTASSIUM SERPL-SCNC: 3.9 MMOL/L (ref 3.4–5.1)
RBC # BLD: 4.38 M/UL (ref 4.5–6)
SODIUM BLD-SCNC: 136 MMOL/L (ref 136–145)
TOTAL PROTEIN: 5.5 G/DL (ref 6.4–8.3)
WBC # BLD: 7.6 K/UL (ref 4–11)

## 2022-06-04 PROCEDURE — 85027 COMPLETE CBC AUTOMATED: CPT

## 2022-06-04 PROCEDURE — 6370000000 HC RX 637 (ALT 250 FOR IP): Performed by: PHYSICIAN ASSISTANT

## 2022-06-04 PROCEDURE — 83735 ASSAY OF MAGNESIUM: CPT

## 2022-06-04 PROCEDURE — 71250 CT THORAX DX C-: CPT

## 2022-06-04 PROCEDURE — 80053 COMPREHEN METABOLIC PANEL: CPT

## 2022-06-04 PROCEDURE — 36415 COLL VENOUS BLD VENIPUNCTURE: CPT

## 2022-06-04 PROCEDURE — 99238 HOSP IP/OBS DSCHRG MGMT 30/<: CPT | Performed by: INTERNAL MEDICINE

## 2022-06-04 RX ORDER — SODIUM CHLORIDE 9 MG/ML
INJECTION, SOLUTION INTRAVENOUS CONTINUOUS
Status: DISCONTINUED | OUTPATIENT
Start: 2022-06-04 | End: 2022-06-04

## 2022-06-04 RX ORDER — LEVOFLOXACIN 750 MG/1
750 TABLET ORAL
Status: DISCONTINUED | OUTPATIENT
Start: 2022-06-04 | End: 2022-06-04

## 2022-06-04 RX ORDER — LEVOFLOXACIN 750 MG/1
750 TABLET ORAL DAILY
Qty: 7 TABLET | Refills: 0 | Status: SHIPPED | OUTPATIENT
Start: 2022-06-04 | End: 2022-06-11

## 2022-06-04 RX ORDER — MIDODRINE HYDROCHLORIDE 5 MG/1
10 TABLET ORAL
Status: DISCONTINUED | OUTPATIENT
Start: 2022-06-04 | End: 2022-06-04

## 2022-06-04 RX ORDER — LEVOFLOXACIN 750 MG/1
750 TABLET ORAL
Status: DISCONTINUED | OUTPATIENT
Start: 2022-06-04 | End: 2022-06-04 | Stop reason: HOSPADM

## 2022-06-04 RX ORDER — M-VIT,TX,IRON,MINS/CALC/FOLIC 27MG-0.4MG
1 TABLET ORAL DAILY
Qty: 30 TABLET | Refills: 0 | Status: SHIPPED | OUTPATIENT
Start: 2022-06-05

## 2022-06-04 RX ADMIN — BACITRACIN ZINC, NEOMYCIN, POLYMYXIN B: 400; 3.5; 5 OINTMENT TOPICAL at 08:03

## 2022-06-04 RX ADMIN — INSULIN LISPRO 1 UNITS: 100 INJECTION, SOLUTION INTRAVENOUS; SUBCUTANEOUS at 11:14

## 2022-06-04 RX ADMIN — METOPROLOL TARTRATE 12.5 MG: 25 TABLET, FILM COATED ORAL at 08:02

## 2022-06-04 RX ADMIN — Medication 1 TABLET: at 08:02

## 2022-06-04 RX ADMIN — LEVOTHYROXINE SODIUM 50 MCG: 0.05 TABLET ORAL at 06:36

## 2022-06-04 RX ADMIN — ALOGLIPTIN 6.25 MG: 12.5 TABLET, FILM COATED ORAL at 08:02

## 2022-06-04 RX ADMIN — ROSUVASTATIN 20 MG: 20 TABLET, FILM COATED ORAL at 08:02

## 2022-06-04 RX ADMIN — PANTOPRAZOLE SODIUM 40 MG: 40 TABLET, DELAYED RELEASE ORAL at 06:36

## 2022-06-04 RX ADMIN — TAMSULOSIN HYDROCHLORIDE 0.4 MG: 0.4 CAPSULE ORAL at 08:02

## 2022-06-04 RX ADMIN — CILOSTAZOL 100 MG: 100 TABLET ORAL at 08:02

## 2022-06-04 RX ADMIN — ASPIRIN 81 MG 81 MG: 81 TABLET ORAL at 08:02

## 2022-06-04 NOTE — DISCHARGE SUMMARY
Discharge Summary      Patient ID: Yulisa Brizuela      Patient's PCP: COURT Wharton    Admit Date: 6/1/2022     Discharge Date:   6/4/2022    Admitting Provider: Leda Webster MD    Discharging Provider: MORRIS Fernando     Reason for this admission:   Fall  Generalized weakness  Pneumothorax on left  Multiple left sided rib fractures  MARIE  Dehydration    Discharge Diagnoses: Active Hospital Problems    Diagnosis Date Noted    Hypomagnesemia [E83.42] 06/03/2022     Priority: Medium    Falls [Q51. XXXA] 06/02/2022     Priority: Medium    Pneumothorax on left [J93.9] 06/02/2022     Priority: Medium    Closed fracture of multiple ribs of left side [S22.42XA] 06/02/2022     Priority: Medium    Hypotension [I95.9] 06/02/2022     Priority: Medium    Weight loss [R63.4] 06/02/2022     Priority: Medium    Hypoalbuminemia [E88.09] 06/02/2022     Priority: Medium    Spinal stenosis [M48.00] 06/02/2022     Priority: Medium    Hypokalemia [E87.6] 04/05/2022    Personal history of nicotine dependence [Z87.891] 04/05/2022    History of stroke [Z86.73] 04/05/2022    CKD (chronic kidney disease) stage 3, GFR 30-59 ml/min (HCC) [N18.30] 02/01/2022    Acute kidney injury superimposed on CKD ([facility] [N17.9, N18.9] 02/01/2022    Generalized weakness [R53.1] 02/01/2022    Hypothyroidism [E03.9] 03/28/2011    Diabetes mellitus, type II ([facility] [E11.9] 03/28/2011    Benign essential HTN [I10] 03/28/2011       Procedures:  CT ABDOMEN PELVIS WO CONTRAST Additional Contrast? None   Final Result   Addendum 1 of 1   ADDENDUM #1    This addendum is performed at the request of the ordering provider for    additional comments to be provided about the lumbar spine. The exam is    otherwise not comprehensively reviewed for the purposes of this addendum. There is slight left convex curvature of the lumbar spine. Standard    lumbosacral morphology with 5 nonrib-bearing lumbar vertebral bodies.     Vertebral body heights are maintained. Transverse processes are intact. No    evidence of acute fracture involving the    lumbar spine. Mild multilevel degenerative endplate spurring. There is evidence of    multilevel disc bulges most pronounced at L4-L5 with calcified disc bulge    which results in severe central stenosis at this level. There is    multilevel facet arthrosis. Moderate    neuroforaminal narrowing greatest at L4-L5 and L5-S1. In addition to the impression provided with the original report, pertinent    findings related to the lumbar spine include:      1. No acute traumatic abnormality involving the lumbar spine. 2.  Lumbar spondylosis with calcified disc bulge at L4-5 causing severe    central stenosis. Final      Small left-sided hydropneumothorax, grossly similar in size to yesterday's chest radiographs are      Associated multiple posterior lateral lower left-sided rib fractures. No acute or suspicious intra-abdominal process. Right renal cyst.      Mild prostatomegaly. XR CHEST PORTABLE   Final Result      Stable left-sided pneumothorax of approximately 20%                  XR CHEST PORTABLE   Final Result      Left sixth, seventh, and eighth rib fractures with associated small left-sided pneumothorax, with the apical pleural margin located 10 mm from the osseous apical margin. There is also a basilar component of the pneumothorax projecting over the left    costophrenic angle. Right lung is clear. Normal cardiomediastinal silhouette. Findings discussed with Dr. Mary Mahmood in the ER on 6/1/22 at [phone]. CT Head WO Contrast   Final Result      Age-related changes in the brain without acute intracranial abnormality.                      Consults:   IP CONSULT TO DIETITIAN  IP CONSULT TO HOME CARE NEEDS  PT OT  Case Management      Briefly:   81-year-old male with past medical history of HTN, hypothyroidism, CVA, CKD 3, PVD and tobacco abuse who was admitted following a fall at home. Upon arrival to the emergency department, patient found to be hypokalemic, hypomagnesemic, hypotensive (as low as 70/40) and an acute renal failure. Chest x-ray showed left-sided rib fractures with a 20% left-sided pneumothorax. Of note, patient noted to have same rib fractures in April after presenting for a fall. Oxygen saturations were 99% on room air and patient denied any shortness of breath. Case discussed with [facility] who recommended no intervention unless patient became hypoxic or short of breath. Patient admitted to this facility for generalized weakness, dehydration, acute renal failure, hypotension, electrolyte abnormalities. He was treated with IV fluids and given electrolyte replacement with improvement. Home hydrochlorothiazide, Lasix, losartan and amlodipine were held. Patient had improvement of his orthostasis and is requesting to be discharged home today. Of note, swing bed precertification was denied by his insurance. Full details of hospital stay are outlined below. Hospital Course:      [facility] [B81. XXXA]  - in setting of hypotension, suspected dehydration, advanced age  - pt/ot   - fall precautions  - ck wnl   - hydrate with ivfs and provide supportive care   - patient ambulating 300 ft with standby assist and per insurance/pt/ot not candidate for swing bed. Family notified over the phone and agreeable to dc home this weekend.     06/02/2022    Pneumothorax on left [J93.9]  - 6/1 serial CXRs with 20% pneumothorax   - ER Dr. Nela Mustafa discussed with [facility] surgery Dr. Rober Alvarado who recommends conservative management especially since he is not soa or hypoxic. Monitor closely.     06/02/2022    Closed fracture of multiple ribs of left side [S22.42XA]  - tylenol as needed for pain  - patient states pain mostly resolved at this point     06/02/2022    Hypotension [I95.9]  - hold home hctz, lasix, losartan, amlodipine. Decreased metoprolol.    - patient with orthostatic hypotension and hydrating with IVFs as below     06/02/2022    Weight loss [R63.4]  - patient with ongoing weight loss over last several months he reports this is unintentional  - ct chest jan 2022 with pneumonia otherwise no acute finding   - with abd pain and weight loss obtained ct abd/pelv 6/2 without acute finding   - psa wnl   - add mvi and ensure   - dietitian consulted   - could consider upper/lower endoscopy as op  - monitor     06/02/2022    Hypoalbuminemia [E88.09]  - encourage po intake and monitor     06/02/2022    Spinal stenosis [M48.00]  - CT imaging with mild multilevel degenerative endplate spurring. Evidence of multilevel disc bulges most pronounced at L4-L5 with calcified disc bulge resulting in severe central stenosis at this level.    - ortho spine as outpatient if patient agreeable   - pt/ot consult   - solumedrol 40 6/3    06/02/2022    Hypokalemia [E87.6]  - monitor and replace as needed    04/05/2022    Personal history of nicotine dependence [Z87.891]  - Patient was strongly encouraged to stop smoking.  Risks of continued smoking and benefits of smoking cessation were discussed.   - nicotine patch ordered    04/05/2022    History of stroke [Z86.73]  - continue current regimen   - f/u with neuro as scheduled     04/05/2022    CKD (chronic kidney disease) stage 3, GFR 30-59 ml/min ([facility]) [N18.30]   - see below  02/01/2022    Acute kidney injury superimposed on CKD ([facility] [address] [N17.9, N18.9]  - Baseline CR ~ 1.2-1.5 per chart review and on arrival to ER 6/1 Cr 2.8   - suspect related to hypotension, dehydration   - hold hctz, losartan, lasix, almodipine   - hydrate with IVFs   - gradually improving with Cr 1/7 on 6/3  - monitor     02/01/2022    Generalized weakness [R53.1]  - suspect multifactorial in setting of advanced age, multiple chronic conditions, spinal stenosis, hypotension, orthostatic hypotension, acute renal failure, dehydration, electrolyte abnormalities   - b12, folate, vit d, tsh wnl 5/10/22  - treat as outlined here  - pt/ot consulted and plan for dc home with [place] when medically stable     02/01/2022    Hypothyroidism [E03.9]  - continue current regimen     03/28/2011    Diabetes mellitus, type II ([facility] [address] [E11.9]  - A1C 7.9 May 2022   - monitor fsbg achs and cover with ssi as needed   - continue nesina   - diabetic diet     03/28/2011    Benign essential HTN [I10]  - see under hypotension     03/28/2011   · Hypomagnesemia   - monitor and replace as needed          Disposition: home    Discharged Condition: Stable    Vital Signs  Temp: 97.7 °F (36.5 °C)  Heart Rate: (!) 104  Resp: 18  BP: (!) 155/76  SpO2: 99 %  O2 Device: None (Room air)       Vital signs reviewed in electronic chart. Physical exam  Constitutional:  Well developed, elderly gentleman sitting up in bed, no acute distress  Eyes:  PERRL, no scleral icterus, conjunctiva normal   HENT:  Atraumatic, external ears normal, nose normal, oropharynx moist, no pharyngeal exudates. Neck- supple, no JVD, no lymphadenopathy  Respiratory:  No respiratory distress on room air, no wheezing, rales or rhonchi detected  Cardiovascular:  Normal rate, normal rhythm, no murmurs, no gallops, no rubs, no edema   GI:  Soft, nondistended, normal bowel sounds, nontender, no voluntary guarding  Musculoskeletal:  No cyanosis or obvious acute deformity. Moving all extremities with generalized weakness throughout.   Integument:  Warm and dry. Neurologic:  Alert & oriented x 3, no apparent focal deficits noted   Psychiatric:  Speech and behavior appropriate       Activity: activity as tolerated  Diet: regular diet  Follow Up: Primary Care Physician in 1 week and Spine as scheduled    Labs:  For convenience and continuity at follow-up the following most recent labs are provided:    CBC:   Lab Results   Component Value Date    WBC 7.6 06/04/2022    HGB 12.7 06/04/2022    HCT 39.6 06/04/2022     06/04/2022       RENAL:   Lab Results   Component Value Date     06/04/2022    K 3.9 06/04/2022    K 2.7 06/02/2022     06/04/2022    CO2 20 06/04/2022    BUN 15 06/04/2022    CREATININE 1.4 06/04/2022         Discharge Medications:     Current Discharge Medication List           Details   Multiple Vitamins-Minerals (THERAPEUTIC MULTIVITAMIN-MINERALS) tablet Take 1 tablet by mouth daily  Qty: 30 tablet, Refills: 0              Details   metoprolol tartrate (LOPRESSOR) 25 MG tablet Take 0.5 tablets by mouth 2 times daily  Qty: 60 tablet, Refills: 3              Details   aspirin 81 MG chewable tablet Take 1 tablet by mouth daily  Qty: 30 tablet, Refills: 3      vitamin D (ERGOCALCIFEROL) 1.25 MG (46966 UT) CAPS capsule Take 1 capsule by mouth once a week  Qty: 8 capsule, Refills: 0      SITagliptin (JANUVIA) 50 MG tablet Take 2 tablets by mouth daily  Qty: 60 tablet, Refills: 0      tamsulosin (FLOMAX) 0.4 MG capsule Take 1 capsule by mouth daily  Qty: 30 capsule, Refills: 3      levothyroxine (SYNTHROID) 50 MCG tablet take 1 tablet by mouth once daily  Qty: 30 tablet, Refills: 1      rosuvastatin (CRESTOR) 20 MG tablet Take 20 mg by mouth daily      omeprazole (PRILOSEC) 20 MG delayed release capsule Take 20 mg by mouth daily      cilostazol (PLETAL) 100 MG tablet Take 1 tablet by mouth 2 times daily. Qty: 60 tablet, Refills: 6              Patient was seen and examined by Dr. Ozzy Granados and plan of care reviewed. Treatment plan was formulated collaboratively. Signed:  Electronically signed by MORRIS Squires on 6/4/2022 at 12:25 PM       Thank you COURT Gerber for the opportunity to be involved in this patient's care. If you have any questions or concerns please feel free to contact me at [phone].

## 2022-06-04 NOTE — FLOWSHEET NOTE
06/04/22 1020   Vital Signs   Blood Pressure Lying 124/58   Pulse Lying 78 PER MINUTE   Blood Pressure Sitting 103/53   Pulse Sitting 89 PER MINUTE   Blood Pressure Standing 75/41   Pulse Standing 85 PER MINUTE

## 2022-06-04 NOTE — PROGRESS NOTES
Pharmacy Renal Adjustment    Dipak Henson is a 81 y.o. male. Pharmacy has renally adjusted medications per protocol. Recent Labs     06/03/22  0331 06/04/22  0444   BUN 22* 15       Recent Labs     06/03/22  0331 06/04/22  0444   CREATININE 1.7* 1.4*       Estimated Creatinine Clearance: 40 mL/min (A) (based on SCr of 1.4 mg/dL (H)). Height:   Ht Readings from Last 1 Encounters:   06/03/22 5' 8\" (1.727 m)     Weight:  Wt Readings from Last 1 Encounters:   06/01/22 161 lb 9.6 oz (73.3 kg)       Plan: Adjust the following medications based on renal function:           Levaquin form 750mg po daily X7 days to 750mg po q48hr x 4 doses. Verified with MORRIS Cabrales & adjustment to be made for discharge prescription.     Electronically signed by Domenica Breaux [address] [place] [address] on 6/4/2022 at 3:24 PM

## 2022-06-04 NOTE — FLOWSHEET NOTE
06/03/22 2036   Assessment   Charting Type Shift assessment   Psychosocial   Psychosocial (WDL) WDL   Neurological   Neuro (WDL) WDL   Level of Consciousness Alert (0)   [place] Coma Scale   Eye Opening 4   Best Verbal Response 5   Best Motor Response 5   [place] Coma Scale Score 14   HEENT (Head, Ears, Eyes, Nose, & Throat)   Right Eye Impaired vision   Left Eye Impaired vision   Right Ear Impaired hearing   Left Ear Impaired hearing   Teeth Dentures upper;Dentures lower   Respiratory   Respiratory (WDL) WDL   Gastrointestinal   Abdominal (WDL) X   Abdomen Inspection Rounded; Soft   Last BM (including prior to admit) 06/01/22   RUQ Bowel Sounds Active   LUQ Bowel Sounds Active   RLQ Bowel Sounds Active   LLQ Bowel Sounds Active   Tenderness Nontender   Peripheral Vascular   Peripheral Vascular (WDL) WDL   Skin Integumentary    Skin Integumentary (WDL) X   Skin Color Pink   Skin Condition/Temp Warm;Dry   Skin Integrity Ecchymosis   Location BUE   Musculoskeletal   Musculoskeletal (WDL) X   RL Extremity Weakness; Unsteady   LL Extremity Weakness; Unsteady

## 2022-06-04 NOTE — FLOWSHEET NOTE
06/04/22 0711   Vital Signs   Temp 97.7 °F (36.5 °C)   Heart Rate (!) 104   Resp 18   BP (!) 155/76   MAP (Calculated) 102.33   Oxygen Therapy   SpO2 99 %   O2 Device None (Room air)

## 2022-06-04 NOTE — FLOWSHEET NOTE
06/04/22 0802   Assessment   Charting Type Shift assessment   Psychosocial   Psychosocial (WDL) WDL   Neurological   Neuro (WDL) WDL   Level of Consciousness Alert (0)   Swallow Screening   Is the patient unable to remain alert for testing? No   Heidelberg Coma Scale   Eye Opening 4   Best Verbal Response 5   Best Motor Response 6   Heidelberg Coma Scale Score 15   NIHSS Stroke Scale   NIHSS Stroke Scale Assessed No   HEENT (Head, Ears, Eyes, Nose, & Throat)   Right Eye Impaired vision   Left Eye Impaired vision   Right Ear Impaired hearing   Left Ear Impaired hearing   Teeth Dentures upper;Dentures lower   Respiratory   Respiratory (WDL) X   Breath Sounds   Right Upper Lobe Clear   Right Middle Lobe Diminished   Right Lower Lobe Diminished   Left Upper Lobe Rhonchi   Left Lower Lobe Rhonchi   Cardiac   Cardiac (WDL) X   Cardiac Regularity Irregular   Cardiac Rhythm Atrial fib   Gastrointestinal   Abdominal (WDL) X   Abdomen Inspection Rounded; Soft   RUQ Bowel Sounds Active   LUQ Bowel Sounds Active   RLQ Bowel Sounds Active   LLQ Bowel Sounds Active   Tenderness Nontender   Peripheral Vascular   Peripheral Vascular (WDL) X   Edema Right lower extremity; Left lower extremity   RLE Edema Trace   LLE Edema Trace   Skin Integumentary    Skin Integumentary (WDL) X   Skin Color Pink   Skin Condition/Temp Dry; Warm   Skin Integrity Ecchymosis   Location BUE   Multiple Skin Integrity Sites Yes   Skin Integrity Site 2   Skin Integrity Location 2 Tear   Location 2 right knee   Preventative Dressing Yes   Assessed this shift Yes   Musculoskeletal   Musculoskeletal (WDL) X   RL Extremity Weakness; Unsteady   LL Extremity Weakness; Unsteady   Pt alert times 4-hard of hearing pt able to take am medications well whole- No complaints of pain - Dressing changed on skin tear this am -Pt tolerated well - Call bell at  [facility] monitor

## 2022-06-04 NOTE — PROGRESS NOTES
Pt discharged home- Pt /family received discharge instructions- Pt/family verbalized understanding of all instructions- IV removed- Cath tip intact- Gauze and tape applied- Telemetry removed- Returned to unit- Daughter aware to  all new medications at [address] [facility] started on Levaquin 750 every other day for pneumonia- Daughter aware -Pneumothorax teaching given- oxygen saturation good throughout hospital stay -Daughter awareDaughter aware of what medications to start-stop and continue-Levaquin information given- Fall prevention information given with diabetic meal planning- All questions answered- Pt left via wheelchair with family at side- All items taken and accounted for - No acute distress noted

## 2022-06-04 NOTE — PLAN OF CARE
Problem: Safety - Adult  Goal: Free from fall injury  6/4/2022 1147 by Zan Sanchez RN  Outcome: Progressing  6/3/2022 2205 by Ozzie Beltrán RN  Outcome: Progressing     Problem: Chronic Conditions and Co-morbidities  Goal: Patient's chronic conditions and co-morbidity symptoms are monitored and maintained or improved  6/3/2022 2205 by Ozzie Beltrán, RN  Outcome: Progressing

## 2022-06-07 ENCOUNTER — CARE COORDINATION (OUTPATIENT)
Dept: CARE COORDINATION | Age: 82
End: 2022-06-07

## 2022-06-07 NOTE — CARE COORDINATION
[facility] Transitions Initial Follow Up Call    Call within 2 business days of discharge: Yes     Patient: Irving Almonte Patient : [birth year] MRN: [id number]    Last Discharge [facility]       Complaint Diagnosis Description Type Department Provider    22 Extremity Weakness; Fall Multiple falls . .. ED to Hosp-Admission (Discharged) (ADMIT) TANNER Hartley MD; Gustavo Mauro DO          RARS: Readmission Risk Score: 16.8 ( )       Spoke with: Mrs. Long tells me that Jeff Stevens remains weak but does seem to be doing better. He is getting around the house on his walker. She denies that he is having any SOA , dizziness or chest pain. We discussed his appointment for Wednesday.       Discharge department/facility: [facility]    Non-face-to-face services provided:  Scheduled appointment with PCPBryon  Obtained and reviewed discharge summary and/or continuity of care documents    Follow Up  Future Appointments   Date Time Provider Yocasta Kessler   2022  2:15 PM Naomi Martinez, [id number] [facility]       Carlos A Quinones, BILLY

## 2022-06-08 ENCOUNTER — HOSPITAL ENCOUNTER (OUTPATIENT)
Facility: HOSPITAL | Age: 82
Discharge: HOME | End: 2022-06-08
Payer: MEDICARE

## 2022-06-08 ENCOUNTER — OFFICE VISIT (OUTPATIENT)
Dept: PRIMARY CARE CLINIC | Age: 82
End: 2022-06-08
Payer: MEDICARE

## 2022-06-08 VITALS
HEART RATE: 94 BPM | SYSTOLIC BLOOD PRESSURE: 110 MMHG | OXYGEN SATURATION: 96 % | HEIGHT: 68 IN | DIASTOLIC BLOOD PRESSURE: 58 MMHG | BODY MASS INDEX: 25.01 KG/M2 | RESPIRATION RATE: 16 BRPM | TEMPERATURE: 97.6 F | WEIGHT: 165 LBS

## 2022-06-08 DIAGNOSIS — I95.89 HYPOTENSION DUE TO HYPOVOLEMIA: ICD-10-CM

## 2022-06-08 DIAGNOSIS — N18.30 STAGE 3 CHRONIC KIDNEY DISEASE, UNSPECIFIED WHETHER STAGE 3A OR 3B CKD (HCC): ICD-10-CM

## 2022-06-08 DIAGNOSIS — E86.1 HYPOTENSION DUE TO HYPOVOLEMIA: ICD-10-CM

## 2022-06-08 DIAGNOSIS — E87.6 HYPOKALEMIA: ICD-10-CM

## 2022-06-08 DIAGNOSIS — N18.30 TYPE 2 DIABETES MELLITUS WITH STAGE 3 CHRONIC KIDNEY DISEASE, UNSPECIFIED WHETHER LONG TERM INSULIN USE, UNSPECIFIED WHETHER STAGE 3A OR 3B CKD (HCC): ICD-10-CM

## 2022-06-08 DIAGNOSIS — I95.89 HYPOTENSION DUE TO HYPOVOLEMIA: Primary | ICD-10-CM

## 2022-06-08 DIAGNOSIS — E83.42 HYPOMAGNESEMIA: ICD-10-CM

## 2022-06-08 DIAGNOSIS — R53.1 WEAKNESS: ICD-10-CM

## 2022-06-08 DIAGNOSIS — Z23 NEED FOR PROPHYLACTIC VACCINATION AGAINST STREPTOCOCCUS PNEUMONIAE (PNEUMOCOCCUS): ICD-10-CM

## 2022-06-08 DIAGNOSIS — E11.22 TYPE 2 DIABETES MELLITUS WITH STAGE 3 CHRONIC KIDNEY DISEASE, UNSPECIFIED WHETHER LONG TERM INSULIN USE, UNSPECIFIED WHETHER STAGE 3A OR 3B CKD (HCC): ICD-10-CM

## 2022-06-08 DIAGNOSIS — E86.1 HYPOTENSION DUE TO HYPOVOLEMIA: Primary | ICD-10-CM

## 2022-06-08 PROCEDURE — 83735 ASSAY OF MAGNESIUM: CPT

## 2022-06-08 PROCEDURE — 99496 TRANSJ CARE MGMT HIGH F2F 7D: CPT | Performed by: NURSE PRACTITIONER

## 2022-06-08 PROCEDURE — 80053 COMPREHEN METABOLIC PANEL: CPT

## 2022-06-08 PROCEDURE — 85027 COMPLETE CBC AUTOMATED: CPT

## 2022-06-08 NOTE — PROGRESS NOTES
Chief Complaint   Patient presents with    Follow-Up from On license of [facility]        Have you seen any other physician or provider since your last visit yes - [facility]    Have you had any other diagnostic tests since your last visit? yes -     Have you changed or stopped any medications since your last visit? no           Post-Discharge Transitional Care Management Services      Rina Barnes   YOB: 1940  He was admitted on 2022 discharged on 2022 coordination follow-up on 2022 via telephone with care provider and patient. Date of Visit:  2022   30 Day Post-Discharge Date: 2022    No Known Allergies  Outpatient Medications Marked as Taking for the 22 encounter (Office Visit) with COURT Hdz   Medication Sig Dispense Refill    metoprolol tartrate (LOPRESSOR) 25 MG tablet Take 0.5 tablets by mouth 2 times daily 60 tablet 3    Multiple Vitamins-Minerals (THERAPEUTIC MULTIVITAMIN-MINERALS) tablet Take 1 tablet by mouth daily 30 tablet 0    [] levoFLOXacin (LEVAQUIN) 750 MG tablet Take 1 tablet by mouth daily for 7 days 7 tablet 0    aspirin 81 MG chewable tablet Take 1 tablet by mouth daily 30 tablet 3    vitamin D (ERGOCALCIFEROL) 1.25 MG (66338 UT) CAPS capsule Take 1 capsule by mouth once a week 8 capsule 0    SITagliptin (JANUVIA) 50 MG tablet Take 2 tablets by mouth daily 60 tablet 0    levothyroxine (SYNTHROID) 50 MCG tablet take 1 tablet by mouth once daily 30 tablet 1    [DISCONTINUED] tamsulosin (FLOMAX) 0.4 MG capsule Take 1 capsule by mouth daily 30 capsule 3    rosuvastatin (CRESTOR) 20 MG tablet Take 20 mg by mouth daily      omeprazole (PRILOSEC) 20 MG delayed release capsule Take 20 mg by mouth daily      cilostazol (PLETAL) 100 MG tablet Take 1 tablet by mouth 2 times daily.  60 tablet 6         Vitals:    22 1443   BP: (!) 110/58   Site: Right Upper Arm   Position: Sitting   Cuff Size: Medium Adult Pulse: 94   Resp: 16   Temp: 97.6 °F (36.4 °C)   TempSrc: Temporal   SpO2: 96%   Weight: 165 lb (74.8 kg)   Height: 5' 8\" (1.727 m)     Body mass index is 25.09 kg/m². Wt Readings from Last 3 Encounters:   06/08/22 165 lb (74.8 kg)   06/01/22 161 lb 9.6 oz (73.3 kg)   05/10/22 174 lb (78.9 kg)     BP Readings from Last 3 Encounters:   06/08/22 (!) 110/58   06/04/22 (!) 155/76   05/10/22 100/60        Patient was admitted to [facility] from 06/01/2022 to 06/04/2022 for weakness,falls,pneumothorax and pneumonia. Upon admission he was found to be hypovolemic with hypotension. He was had hypokalemia and hypomagnesia. He had poor p.o. intake. Upon evaluation he had suffered rib fracture with pneumothorax. With after consultation he was elected to stay at [facility] for further treatment. He had replacement of electrolytes and fluids and his status improved and he was discharged home with family. He has longstanding history of weakness and frequent falls. He is mostly in a wheelchair. He has had poor p.o. intake his daughter is present with him today. She states that they realized that his wife needs more help with medication management for him and overseeing of him. So the daughter is stepped into doing more for her father who manages medications. Since returning home things have improved. He still weak he is receiving home health evaluation for physical therapy and Occupational Therapy. Inpatient course: Discharge summary reviewed- see chart.     Current status: fair    Review of Systems:  A comprehensive review of systems was negative except for: Respiratory: positive for cough and shortness of breath  Musculoskeletal: positive for arthralgias and muscle weakness  Behavioral/Psych: positive for anxiety, bad mood and behavior problems    Physical Exam:  General Appearance: alert and oriented to person, place and time, well developed and well- nourished, in no acute basis.  I have also advised him to have a yearly eye exam and to monitor his feet closely. Along with instruction of possible complications related to diabetes, even with good control. A1C will be checked  in few months. Lab Results   Component Value Date    LABA1C 7.9 (H) 04/04/2022       Stage 3 chronic kidney disease, unspecified whether stage 3a or 3b CKD (HCC)  -     CBC; Future  May need to follow with nephrology. Hypokalemia  Will check labs for stability. Continue intack.          Diagnostic test results reviewed: inpatient labs and chest x-ray    Patient risk of morbidity and mortality: high    Medical Decision Making: high complexity

## 2022-06-09 LAB
A/G RATIO: 1.8 (ref 0.8–2)
ALBUMIN SERPL-MCNC: 3.6 G/DL (ref 3.4–4.8)
ALP BLD-CCNC: 72 U/L (ref 25–100)
ALT SERPL-CCNC: 6 U/L (ref 4–36)
ANION GAP SERPL CALCULATED.3IONS-SCNC: 10 MMOL/L (ref 3–16)
AST SERPL-CCNC: 9 U/L (ref 8–33)
BILIRUB SERPL-MCNC: 0.3 MG/DL (ref 0.3–1.2)
BUN BLDV-MCNC: 16 MG/DL (ref 6–20)
CALCIUM SERPL-MCNC: 10.4 MG/DL (ref 8.5–10.5)
CHLORIDE BLD-SCNC: 109 MMOL/L (ref 98–107)
CO2: 22 MMOL/L (ref 20–30)
CREAT SERPL-MCNC: 1.5 MG/DL (ref 0.4–1.2)
GFR AFRICAN AMERICAN: 54
GFR NON-AFRICAN AMERICAN: 45
GLOBULIN: 2 G/DL
GLUCOSE BLD-MCNC: 159 MG/DL (ref 74–106)
HCT VFR BLD CALC: 41.4 % (ref 40–54)
HEMOGLOBIN: 13.3 G/DL (ref 13–18)
MAGNESIUM: 1.7 MG/DL (ref 1.7–2.4)
MCH RBC QN AUTO: 29.4 PG (ref 27–32)
MCHC RBC AUTO-ENTMCNC: 32.1 G/DL (ref 31–35)
MCV RBC AUTO: 91.6 FL (ref 80–100)
PDW BLD-RTO: 14.7 % (ref 11–16)
PLATELET # BLD: 316 K/UL (ref 150–400)
PMV BLD AUTO: 9.7 FL (ref 6–10)
POTASSIUM SERPL-SCNC: 4.1 MMOL/L (ref 3.4–5.1)
RBC # BLD: 4.52 M/UL (ref 4.5–6)
SODIUM BLD-SCNC: 141 MMOL/L (ref 136–145)
TOTAL PROTEIN: 5.6 G/DL (ref 6.4–8.3)
WBC # BLD: 7.1 K/UL (ref 4–11)

## 2022-06-13 DIAGNOSIS — N18.30 STAGE 3 CHRONIC KIDNEY DISEASE, UNSPECIFIED WHETHER STAGE 3A OR 3B CKD (HCC): Primary | ICD-10-CM

## 2022-06-13 RX ORDER — TAMSULOSIN HYDROCHLORIDE 0.4 MG/1
0.4 CAPSULE ORAL DAILY
Qty: 30 CAPSULE | Refills: 3 | Status: SHIPPED | OUTPATIENT
Start: 2022-06-13 | End: 2022-10-18

## 2022-07-22 ENCOUNTER — OFFICE VISIT (OUTPATIENT)
Dept: PRIMARY CARE CLINIC | Age: 82
End: 2022-07-22
Payer: MEDICARE

## 2022-07-22 VITALS
OXYGEN SATURATION: 97 % | DIASTOLIC BLOOD PRESSURE: 62 MMHG | BODY MASS INDEX: 23.87 KG/M2 | WEIGHT: 157 LBS | RESPIRATION RATE: 18 BRPM | HEART RATE: 72 BPM | SYSTOLIC BLOOD PRESSURE: 134 MMHG

## 2022-07-22 DIAGNOSIS — E55.9 VITAMIN D DEFICIENCY: ICD-10-CM

## 2022-07-22 DIAGNOSIS — N18.30 STAGE 3 CHRONIC KIDNEY DISEASE, UNSPECIFIED WHETHER STAGE 3A OR 3B CKD (HCC): ICD-10-CM

## 2022-07-22 DIAGNOSIS — E78.00 PURE HYPERCHOLESTEROLEMIA: ICD-10-CM

## 2022-07-22 DIAGNOSIS — Z13.29 THYROID DISORDER SCREEN: ICD-10-CM

## 2022-07-22 DIAGNOSIS — E11.22 TYPE 2 DIABETES MELLITUS WITH STAGE 3 CHRONIC KIDNEY DISEASE, UNSPECIFIED WHETHER LONG TERM INSULIN USE, UNSPECIFIED WHETHER STAGE 3A OR 3B CKD (HCC): ICD-10-CM

## 2022-07-22 DIAGNOSIS — I69.30 LATE EFFECT OF LACUNAR INFARCTION: ICD-10-CM

## 2022-07-22 DIAGNOSIS — N18.30 TYPE 2 DIABETES MELLITUS WITH STAGE 3 CHRONIC KIDNEY DISEASE, UNSPECIFIED WHETHER LONG TERM INSULIN USE, UNSPECIFIED WHETHER STAGE 3A OR 3B CKD (HCC): ICD-10-CM

## 2022-07-22 DIAGNOSIS — R46.89 COGNITIVE AND BEHAVIORAL CHANGES: Primary | ICD-10-CM

## 2022-07-22 DIAGNOSIS — R41.89 COGNITIVE AND BEHAVIORAL CHANGES: Primary | ICD-10-CM

## 2022-07-22 PROCEDURE — 99214 OFFICE O/P EST MOD 30 MIN: CPT | Performed by: NURSE PRACTITIONER

## 2022-07-22 PROCEDURE — 1123F ACP DISCUSS/DSCN MKR DOCD: CPT | Performed by: NURSE PRACTITIONER

## 2022-07-22 PROCEDURE — 4004F PT TOBACCO SCREEN RCVD TLK: CPT | Performed by: NURSE PRACTITIONER

## 2022-07-22 PROCEDURE — 3051F HG A1C>EQUAL 7.0%<8.0%: CPT | Performed by: NURSE PRACTITIONER

## 2022-07-22 PROCEDURE — G8420 CALC BMI NORM PARAMETERS: HCPCS | Performed by: NURSE PRACTITIONER

## 2022-07-22 PROCEDURE — G8427 DOCREV CUR MEDS BY ELIG CLIN: HCPCS | Performed by: NURSE PRACTITIONER

## 2022-07-22 RX ORDER — LEVOTHYROXINE SODIUM 0.05 MG/1
TABLET ORAL
Qty: 30 TABLET | Refills: 1 | Status: CANCELLED | OUTPATIENT
Start: 2022-07-22

## 2022-07-22 ASSESSMENT — ENCOUNTER SYMPTOMS
GASTROINTESTINAL NEGATIVE: 1
EYES NEGATIVE: 1
RESPIRATORY NEGATIVE: 1

## 2022-07-22 NOTE — PROGRESS NOTES
SUBJECTIVE:    Patient ID: Carmen Angel is a 81 y.o. male. Chief Complaint   Patient presents with    Diabetes    Hypothyroidism    Hyperlipidemia         HPI:  With his family to follow-up on his medications. He is having a lot of behavioral issues. He is getting more more agitated and harder to handle. His daughter is considering trying to place him in a nursing home but family is reluctant at this time. Is a wheelchair. She states that he is taking all medication including his blood pressure medicine his diabetic medicine and his blood pressure medicine. They did check his fingerstick on occasion. Patient's medications,allergies, past medical, surgical, social and family histories were reviewed and updated as appropriate. .  Current Outpatient Medications on File Prior to Visit   Medication Sig Dispense Refill    tamsulosin (FLOMAX) 0.4 mg capsule TAKE 1 CAPSULE BY MOUTH DAILY 30 capsule 3    metoprolol tartrate (LOPRESSOR) 25 MG tablet Take 0.5 tablets by mouth 2 times daily 60 tablet 3    Multiple Vitamins-Minerals (THERAPEUTIC MULTIVITAMIN-MINERALS) tablet Take 1 tablet by mouth daily 30 tablet 0    aspirin 81 MG chewable tablet Take 1 tablet by mouth daily 30 tablet 3    SITagliptin (JANUVIA) 50 MG tablet Take 2 tablets by mouth daily 60 tablet 0    rosuvastatin (CRESTOR) 20 MG tablet Take 20 mg by mouth daily      omeprazole (PRILOSEC) 20 MG delayed release capsule Take 20 mg by mouth daily      cilostazol (PLETAL) 100 MG tablet Take 1 tablet by mouth 2 times daily. 60 tablet 6     No current facility-administered medications on file prior to visit. Review of Systems   Constitutional: Negative. HENT: Negative. Eyes: Negative. Respiratory: Negative. Cardiovascular: Negative. Gastrointestinal: Negative. Genitourinary: Negative. Musculoskeletal: Negative. Skin: Negative. Neurological:  Positive for weakness.    Psychiatric/Behavioral:  Positive for behavioral problems. OBJECTIVE:  /62   Pulse 72   Resp 18   Wt 157 lb (71.2 kg)   SpO2 97%   BMI 23.87 kg/m²    Physical Exam  Vitals and nursing note reviewed. Constitutional:       Appearance: He is well-developed. HENT:      Head: Normocephalic and atraumatic. Eyes:      Conjunctiva/sclera: Conjunctivae normal.      Pupils: Pupils are equal, round, and reactive to light. Neck:      Thyroid: No thyromegaly. Vascular: No JVD. Cardiovascular:      Rate and Rhythm: Normal rate and regular rhythm. Heart sounds: No murmur heard. No friction rub. No gallop. Pulmonary:      Effort: Pulmonary effort is normal. No respiratory distress. Breath sounds: Normal breath sounds. No wheezing or rales. Abdominal:      General: Bowel sounds are normal. There is no distension. Palpations: Abdomen is soft. Tenderness: There is no abdominal tenderness. There is no guarding. Musculoskeletal:         General: No tenderness. Cervical back: Normal range of motion and neck supple. Skin:     General: Skin is warm and dry. Findings: No rash. Neurological:      Mental Status: He is alert and oriented to person, place, and time. Motor: Weakness present. Coordination: Romberg sign positive. Coordination abnormal. Impaired rapid alternating movements. Psychiatric:         Cognition and Memory: Cognition is impaired.          Judgment: Judgment normal.       Results in Past 30 Days  Result Component Current Result Ref Range Previous Result Ref Range   Albumin/Globulin Ratio 2.3 (H) (8/3/2022) 0.8 - 2.0 1.5 (8/1/2022) 0.8 - 2.0   Albumin 3.7 (8/3/2022) 3.4 - 4.8 g/dL 3.4 (8/1/2022) 3.4 - 4.8 g/dL   Alkaline Phosphatase 64 (8/3/2022) 25 - 100 U/L 66 (8/1/2022) 25 - 100 U/L   ALT 16 (8/3/2022) 4 - 36 U/L 16 (8/1/2022) 4 - 36 U/L   AST 13 (8/3/2022) 8 - 33 U/L 14 (8/1/2022) 8 - 33 U/L   BUN 16 (8/3/2022) 6 - 20 mg/dL 10 (8/1/2022) 6 - 20 mg/dL   Calcium 10.7 (H) (8/3/2022) 8.5 - 10.5 mg/dL 10.2 (8/1/2022) 8.5 - 10.5 mg/dL   Chloride 109 (H) (8/3/2022) 98 - 107 mmol/L 111 (H) (8/1/2022) 98 - 107 mmol/L   CO2 21 (8/3/2022) 20 - 30 mmol/L 21 (8/1/2022) 20 - 30 mmol/L   Creatinine 1.5 (H) (8/3/2022) 0.4 - 1.2 mg/dL 1.3 (H) (8/1/2022) 0.4 - 1.2 mg/dL   GFR  54 (L) (8/3/2022) >59 >59 (8/1/2022) >59   GFR Non- 45 (L) (8/3/2022) >59 53 (L) (8/1/2022) >59   Globulin 1.6 (8/3/2022) Not Established g/dL 2.2 (8/1/2022) Not Established g/dL   Glucose 154 (H) (8/3/2022) 74 - 106 mg/dL 149 (H) (8/1/2022) 74 - 106 mg/dL   Potassium reflex Magnesium 4.0 (8/3/2022) 3.4 - 5.1 mmol/L 3.5 (8/1/2022) 3.4 - 5.1 mmol/L   Sodium 139 (8/3/2022) 136 - 145 mmol/L 142 (8/1/2022) 136 - 145 mmol/L   Total Bilirubin 0.7 (8/3/2022) 0.3 - 1.2 mg/dL 0.6 (8/1/2022) 0.3 - 1.2 mg/dL   Total Protein 5.3 (L) (8/3/2022) 6.4 - 8.3 g/dL 5.6 (L) (8/1/2022) 6.4 - 8.3 g/dL       Hemoglobin A1C (%)   Date Value   04/04/2022 7.9 (H)     Microscopic Examination (no units)   Date Value   07/29/2022 YES     LDL Calculated (mg/dL)   Date Value   05/10/2022 65           Lab Results   Component Value Date    TSH 0.58 07/31/2022         ASSESSMENT/PLAN:     Danielle James was seen today for diabetes, hypothyroidism and hyperlipidemia. Diagnoses and all orders for this visit:    Cognitive and behavioral changes  -     External Referral To Neurology  -     Comprehensive Metabolic Panel; Future  -     CBC; Future    Late effect of lacunar infarction  -     External Referral To Neurology    Type 2 diabetes mellitus with stage 3 chronic kidney disease, unspecified whether long term insulin use, unspecified whether stage 3a or 3b CKD ([facility] [address]  -     Comprehensive Metabolic Panel; Future  -     CBC; Future  Stable. I advised him regarding diabetic diet, exercise and weight control. Also, I advised him to stay on the current medication, monitor his fingerstick closely. I am going to check the A1c every few months.   I will check microalbumin on a yearly basis. I have also advised him to have a yearly eye exam and to monitor his feet closely. Along with instruction of possible complications related to diabetes, even with good control. A1C will be checked  in few months. Lab Results   Component Value Date    LABA1C 7.9 (H) 04/04/2022       Stage 3 chronic kidney disease, unspecified whether stage 3a or 3b CKD ([facility] [address]  Monitor renal function. Vitamin D deficiency  -     Vitamin B12 & Folate; Future  -     Vitamin D 25 Hydroxy; Future    Thyroid disorder screen  -     TSH; Future    Pure hypercholesterolemia  -     Lipid Panel;  Future    Medications Discontinued During This Encounter   Medication Reason    levothyroxine (SYNTHROID) 50 MCG tablet Therapy completed

## 2022-07-22 NOTE — PROGRESS NOTES
Chief Complaint   Patient presents with    Diabetes    Hypothyroidism    Hyperlipidemia       Have you seen any other physician or provider since your last visit no    Have you had any other diagnostic tests since your last visit? no    Have you changed or stopped any medications since your last visit?  yes - pt doesn't have levothyroxine

## 2022-07-25 ENCOUNTER — TELEPHONE (OUTPATIENT)
Dept: PRIMARY CARE CLINIC | Age: 82
End: 2022-07-25

## 2022-07-25 DIAGNOSIS — F91.9 BEHAVIOR DISTURBANCE: Primary | ICD-10-CM

## 2022-07-25 RX ORDER — QUETIAPINE FUMARATE 25 MG/1
25 TABLET, FILM COATED ORAL NIGHTLY
Qty: 30 TABLET | Refills: 2 | Status: SHIPPED | OUTPATIENT
Start: 2022-07-25 | End: 2022-10-03

## 2022-07-25 NOTE — TELEPHONE ENCOUNTER
Daughter came to office and stated that patient has not been eating for 2 days. He is also getting more aggressive and has threatened his wife and family. Daughter is asking for advice.

## 2022-07-29 ENCOUNTER — APPOINTMENT (OUTPATIENT)
Dept: CT IMAGING | Facility: HOSPITAL | Age: 82
DRG: 641 | End: 2022-07-29
Payer: MEDICARE

## 2022-07-29 ENCOUNTER — APPOINTMENT (OUTPATIENT)
Dept: GENERAL RADIOLOGY | Facility: HOSPITAL | Age: 82
DRG: 641 | End: 2022-07-29
Payer: MEDICARE

## 2022-07-29 ENCOUNTER — HOSPITAL ENCOUNTER (INPATIENT)
Facility: HOSPITAL | Age: 82
LOS: 5 days | Discharge: HOME HEALTH | DRG: 641 | End: 2022-08-03
Attending: HOSPITALIST | Admitting: INTERNAL MEDICINE
Payer: MEDICARE

## 2022-07-29 DIAGNOSIS — E87.6 HYPOKALEMIA: ICD-10-CM

## 2022-07-29 DIAGNOSIS — E83.52 HYPERCALCEMIA: Primary | ICD-10-CM

## 2022-07-29 DIAGNOSIS — F03.918 CHRONIC DEMENTIA WITH BEHAVIORAL DISTURBANCE: ICD-10-CM

## 2022-07-29 LAB
A/G RATIO: 1.8 (ref 0.8–2)
ALBUMIN SERPL-MCNC: 4.4 G/DL (ref 3.4–4.8)
ALP BLD-CCNC: 69 U/L (ref 25–100)
ALT SERPL-CCNC: 23 U/L (ref 4–36)
AMORPHOUS: NORMAL /HPF
AMPHETAMINE SCREEN, URINE: NORMAL
ANION GAP SERPL CALCULATED.3IONS-SCNC: 12 MMOL/L (ref 3–16)
AST SERPL-CCNC: 16 U/L (ref 8–33)
BARBITURATE SCREEN URINE: NORMAL
BASOPHILS ABSOLUTE: 0 K/UL (ref 0–0.1)
BASOPHILS RELATIVE PERCENT: 0.4 %
BENZODIAZEPINE SCREEN, URINE: NORMAL
BILIRUB SERPL-MCNC: 0.9 MG/DL (ref 0.3–1.2)
BILIRUBIN URINE: ABNORMAL
BLOOD, URINE: NEGATIVE
BUN BLDV-MCNC: 20 MG/DL (ref 6–20)
BUPRENORPHINE QUAL, URINE: NORMAL
CALCIUM SERPL-MCNC: 11.7 MG/DL (ref 8.5–10.5)
CANNABINOID SCREEN URINE: NORMAL
CHLORIDE BLD-SCNC: 103 MMOL/L (ref 98–107)
CLARITY: CLEAR
CO2: 26 MMOL/L (ref 20–30)
COCAINE METABOLITE SCREEN URINE: NORMAL
COLOR: YELLOW
CREAT SERPL-MCNC: 1.7 MG/DL (ref 0.4–1.2)
EKG ATRIAL RATE: 75 BPM
EKG DIAGNOSIS: NORMAL
EKG P AXIS: -22 DEGREES
EKG P-R INTERVAL: 160 MS
EKG Q-T INTERVAL: 398 MS
EKG QRS DURATION: 94 MS
EKG QTC CALCULATION (BAZETT): 444 MS
EKG R AXIS: -17 DEGREES
EKG T AXIS: 65 DEGREES
EKG VENTRICULAR RATE: 75 BPM
EOSINOPHILS ABSOLUTE: 0 K/UL (ref 0–0.4)
EOSINOPHILS RELATIVE PERCENT: 0.5 %
EPITHELIAL CELLS, UA: NORMAL /HPF (ref 0–5)
GFR AFRICAN AMERICAN: 47
GFR NON-AFRICAN AMERICAN: 39
GLOBULIN: 2.4 G/DL
GLUCOSE BLD-MCNC: 159 MG/DL (ref 74–106)
GLUCOSE BLD-MCNC: 211 MG/DL (ref 74–106)
GLUCOSE URINE: NEGATIVE MG/DL
HCT VFR BLD CALC: 46.2 % (ref 40–54)
HEMOGLOBIN: 15.6 G/DL (ref 13–18)
IMMATURE GRANULOCYTES #: 0 K/UL
IMMATURE GRANULOCYTES %: 0.4 % (ref 0–5)
KETONES, URINE: ABNORMAL MG/DL
LEUKOCYTE ESTERASE, URINE: NEGATIVE
LYMPHOCYTES ABSOLUTE: 1.7 K/UL (ref 1.5–4)
LYMPHOCYTES RELATIVE PERCENT: 22.4 %
Lab: NORMAL
MAGNESIUM: 1.7 MG/DL (ref 1.7–2.4)
MCH RBC QN AUTO: 29.7 PG (ref 27–32)
MCHC RBC AUTO-ENTMCNC: 33.8 G/DL (ref 31–35)
MCV RBC AUTO: 88 FL (ref 80–100)
METHADONE SCREEN, URINE: NORMAL
METHAMPHETAMINE, URINE: NORMAL
MICROSCOPIC EXAMINATION: YES
MONOCYTES ABSOLUTE: 0.7 K/UL (ref 0.2–0.8)
MONOCYTES RELATIVE PERCENT: 9.9 %
NEUTROPHILS ABSOLUTE: 5 K/UL (ref 2–7.5)
NEUTROPHILS RELATIVE PERCENT: 66.4 %
NITRITE, URINE: NEGATIVE
OPIATE SCREEN URINE: NORMAL
PDW BLD-RTO: 14.1 % (ref 11–16)
PERFORMED ON: ABNORMAL
PH UA: 6 (ref 5–8)
PHENCYCLIDINE SCREEN URINE: NORMAL
PLATELET # BLD: 222 K/UL (ref 150–400)
PMV BLD AUTO: 9.5 FL (ref 6–10)
POTASSIUM REFLEX MAGNESIUM: 3.3 MMOL/L (ref 3.4–5.1)
PROPOXYPHENE SCREEN, URINE: NORMAL
PROTEIN UA: >=300 MG/DL
RBC # BLD: 5.25 M/UL (ref 4.5–6)
RBC UA: NORMAL /HPF (ref 0–4)
SARS-COV-2, NAAT: NOT DETECTED
SODIUM BLD-SCNC: 141 MMOL/L (ref 136–145)
SPECIFIC GRAVITY UA: 1.02 (ref 1–1.03)
TOTAL PROTEIN: 6.8 G/DL (ref 6.4–8.3)
TRICYCLIC, URINE: NORMAL
TROPONIN: <0.3 NG/ML
UR OXYCODONE RAPID SCREEN: NORMAL
URINE REFLEX TO CULTURE: ABNORMAL
URINE TYPE: ABNORMAL
UROBILINOGEN, URINE: 4 E.U./DL
WBC # BLD: 7.5 K/UL (ref 4–11)
WBC UA: NORMAL /HPF (ref 0–5)

## 2022-07-29 PROCEDURE — 6370000000 HC RX 637 (ALT 250 FOR IP): Performed by: PHYSICIAN ASSISTANT

## 2022-07-29 PROCEDURE — 85025 COMPLETE CBC W/AUTO DIFF WBC: CPT

## 2022-07-29 PROCEDURE — 99285 EMERGENCY DEPT VISIT HI MDM: CPT

## 2022-07-29 PROCEDURE — 80307 DRUG TEST PRSMV CHEM ANLYZR: CPT

## 2022-07-29 PROCEDURE — 93005 ELECTROCARDIOGRAM TRACING: CPT

## 2022-07-29 PROCEDURE — 36415 COLL VENOUS BLD VENIPUNCTURE: CPT

## 2022-07-29 PROCEDURE — 71045 X-RAY EXAM CHEST 1 VIEW: CPT

## 2022-07-29 PROCEDURE — 84484 ASSAY OF TROPONIN QUANT: CPT

## 2022-07-29 PROCEDURE — 1200000000 HC SEMI PRIVATE

## 2022-07-29 PROCEDURE — 87635 SARS-COV-2 COVID-19 AMP PRB: CPT

## 2022-07-29 PROCEDURE — 80053 COMPREHEN METABOLIC PANEL: CPT

## 2022-07-29 PROCEDURE — 83735 ASSAY OF MAGNESIUM: CPT

## 2022-07-29 PROCEDURE — 81001 URINALYSIS AUTO W/SCOPE: CPT

## 2022-07-29 PROCEDURE — 70450 CT HEAD/BRAIN W/O DYE: CPT

## 2022-07-29 PROCEDURE — 2580000003 HC RX 258: Performed by: PHYSICIAN ASSISTANT

## 2022-07-29 PROCEDURE — 2580000003 HC RX 258: Performed by: HOSPITALIST

## 2022-07-29 RX ORDER — TAMSULOSIN HYDROCHLORIDE 0.4 MG/1
0.4 CAPSULE ORAL DAILY
Status: DISCONTINUED | OUTPATIENT
Start: 2022-07-30 | End: 2022-08-03 | Stop reason: HOSPADM

## 2022-07-29 RX ORDER — POTASSIUM CHLORIDE 20 MEQ/1
40 TABLET, EXTENDED RELEASE ORAL ONCE
Status: COMPLETED | OUTPATIENT
Start: 2022-07-29 | End: 2022-07-29

## 2022-07-29 RX ORDER — NICOTINE 21 MG/24HR
1 PATCH, TRANSDERMAL 24 HOURS TRANSDERMAL DAILY
Status: DISCONTINUED | OUTPATIENT
Start: 2022-07-30 | End: 2022-08-03 | Stop reason: HOSPADM

## 2022-07-29 RX ORDER — QUETIAPINE FUMARATE 25 MG/1
25 TABLET, FILM COATED ORAL NIGHTLY
Status: DISCONTINUED | OUTPATIENT
Start: 2022-07-29 | End: 2022-08-03 | Stop reason: HOSPADM

## 2022-07-29 RX ORDER — SODIUM CHLORIDE 9 MG/ML
INJECTION, SOLUTION INTRAVENOUS CONTINUOUS
Status: DISCONTINUED | OUTPATIENT
Start: 2022-07-29 | End: 2022-07-31

## 2022-07-29 RX ORDER — ACETAMINOPHEN 325 MG/1
650 TABLET ORAL EVERY 6 HOURS PRN
Status: DISCONTINUED | OUTPATIENT
Start: 2022-07-29 | End: 2022-08-03 | Stop reason: HOSPADM

## 2022-07-29 RX ORDER — PANTOPRAZOLE SODIUM 40 MG/1
40 TABLET, DELAYED RELEASE ORAL
Status: DISCONTINUED | OUTPATIENT
Start: 2022-07-30 | End: 2022-08-03 | Stop reason: HOSPADM

## 2022-07-29 RX ORDER — ROSUVASTATIN CALCIUM 20 MG/1
20 TABLET, COATED ORAL DAILY
Status: DISCONTINUED | OUTPATIENT
Start: 2022-07-30 | End: 2022-08-03 | Stop reason: HOSPADM

## 2022-07-29 RX ORDER — ACETAMINOPHEN 650 MG/1
650 SUPPOSITORY RECTAL EVERY 6 HOURS PRN
Status: DISCONTINUED | OUTPATIENT
Start: 2022-07-29 | End: 2022-08-03 | Stop reason: HOSPADM

## 2022-07-29 RX ORDER — ASPIRIN 81 MG/1
81 TABLET, CHEWABLE ORAL DAILY
Status: DISCONTINUED | OUTPATIENT
Start: 2022-07-30 | End: 2022-08-03 | Stop reason: HOSPADM

## 2022-07-29 RX ORDER — ENOXAPARIN SODIUM 100 MG/ML
40 INJECTION SUBCUTANEOUS DAILY
Status: DISCONTINUED | OUTPATIENT
Start: 2022-07-30 | End: 2022-08-03 | Stop reason: HOSPADM

## 2022-07-29 RX ORDER — 0.9 % SODIUM CHLORIDE 0.9 %
500 INTRAVENOUS SOLUTION INTRAVENOUS ONCE
Status: COMPLETED | OUTPATIENT
Start: 2022-07-29 | End: 2022-07-29

## 2022-07-29 RX ORDER — ONDANSETRON 4 MG/1
4 TABLET, ORALLY DISINTEGRATING ORAL EVERY 8 HOURS PRN
Status: DISCONTINUED | OUTPATIENT
Start: 2022-07-29 | End: 2022-08-03 | Stop reason: HOSPADM

## 2022-07-29 RX ORDER — ALOGLIPTIN 12.5 MG/1
6.25 TABLET, FILM COATED ORAL DAILY
Status: DISCONTINUED | OUTPATIENT
Start: 2022-07-30 | End: 2022-08-03 | Stop reason: HOSPADM

## 2022-07-29 RX ORDER — POLYETHYLENE GLYCOL 3350 17 G/17G
17 POWDER, FOR SOLUTION ORAL DAILY PRN
Status: DISCONTINUED | OUTPATIENT
Start: 2022-07-29 | End: 2022-08-03 | Stop reason: HOSPADM

## 2022-07-29 RX ORDER — ONDANSETRON 2 MG/ML
4 INJECTION INTRAMUSCULAR; INTRAVENOUS EVERY 6 HOURS PRN
Status: DISCONTINUED | OUTPATIENT
Start: 2022-07-29 | End: 2022-08-03 | Stop reason: HOSPADM

## 2022-07-29 RX ORDER — LORAZEPAM 4 MG/ML
0.5 INJECTION, SOLUTION INTRAMUSCULAR; INTRAVENOUS 2 TIMES DAILY PRN
Status: DISCONTINUED | OUTPATIENT
Start: 2022-07-29 | End: 2022-08-03 | Stop reason: HOSPADM

## 2022-07-29 RX ORDER — CILOSTAZOL 100 MG/1
100 TABLET ORAL 2 TIMES DAILY
Status: DISCONTINUED | OUTPATIENT
Start: 2022-07-29 | End: 2022-08-03 | Stop reason: HOSPADM

## 2022-07-29 RX ADMIN — QUETIAPINE FUMARATE 25 MG: 25 TABLET ORAL at 21:26

## 2022-07-29 RX ADMIN — POTASSIUM CHLORIDE 40 MEQ: 1500 TABLET, EXTENDED RELEASE ORAL at 21:25

## 2022-07-29 RX ADMIN — SODIUM CHLORIDE: 9 INJECTION, SOLUTION INTRAVENOUS at 21:28

## 2022-07-29 RX ADMIN — METOPROLOL TARTRATE 12.5 MG: 25 TABLET, FILM COATED ORAL at 21:26

## 2022-07-29 RX ADMIN — SODIUM CHLORIDE 500 ML: 9 INJECTION, SOLUTION INTRAVENOUS at 17:51

## 2022-07-29 RX ADMIN — CILOSTAZOL 100 MG: 100 TABLET ORAL at 21:26

## 2022-07-29 NOTE — ED TRIAGE NOTES
Pt family states that he has not been eating. They state that he has been combative. Pt believes he is here to see Dr. Delmy Bhagat and that his potassium is low or else he would not have willingly of came to the ED.
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
## 2022-07-29 NOTE — LETTER
Pt has used you all in the past.  If you are able, pt would benefit from [facility]. NH today    #       Boris Stephenson, BILLY  Care Coordinator  [facility]  [address], [place]  Office:  [phone]  Fax:  [phone]  [email or web address]

## 2022-07-29 NOTE — LETTER
#:        Let me know if you can accept. Daughter is applying for guardianship. Medically ready.     bD Alvarado

## 2022-07-29 NOTE — ED NOTES
IV inserted without difficulty. Pt tolerated well. Family remains at bedside.      Annemarie Lyles RN  07/29/22 7405

## 2022-07-30 PROBLEM — R41.82 AMS (ALTERED MENTAL STATUS): Status: ACTIVE | Noted: 2022-07-30

## 2022-07-30 PROBLEM — R41.3 MEMORY PROBLEM: Status: ACTIVE | Noted: 2022-07-30

## 2022-07-30 LAB
A/G RATIO: 1.6 (ref 0.8–2)
ALBUMIN SERPL-MCNC: 3.2 G/DL (ref 3.4–4.8)
ALP BLD-CCNC: 60 U/L (ref 25–100)
ALT SERPL-CCNC: 17 U/L (ref 4–36)
ANION GAP SERPL CALCULATED.3IONS-SCNC: 10 MMOL/L (ref 3–16)
AST SERPL-CCNC: 14 U/L (ref 8–33)
BASOPHILS ABSOLUTE: 0 K/UL (ref 0–0.1)
BASOPHILS RELATIVE PERCENT: 0.7 %
BILIRUB SERPL-MCNC: 0.6 MG/DL (ref 0.3–1.2)
BUN BLDV-MCNC: 19 MG/DL (ref 6–20)
CALCIUM SERPL-MCNC: 10 MG/DL (ref 8.5–10.5)
CHLORIDE BLD-SCNC: 110 MMOL/L (ref 98–107)
CO2: 23 MMOL/L (ref 20–30)
CREAT SERPL-MCNC: 1.5 MG/DL (ref 0.4–1.2)
EOSINOPHILS ABSOLUTE: 0.1 K/UL (ref 0–0.4)
EOSINOPHILS RELATIVE PERCENT: 1.8 %
FOLATE: 13.82 NG/ML
GFR AFRICAN AMERICAN: 54
GFR NON-AFRICAN AMERICAN: 45
GLOBULIN: 2 G/DL
GLUCOSE BLD-MCNC: 112 MG/DL (ref 74–106)
GLUCOSE BLD-MCNC: 126 MG/DL (ref 74–106)
GLUCOSE BLD-MCNC: 133 MG/DL (ref 74–106)
GLUCOSE BLD-MCNC: 156 MG/DL (ref 74–106)
GLUCOSE BLD-MCNC: 175 MG/DL (ref 74–106)
HCT VFR BLD CALC: 38.2 % (ref 40–54)
HEMOGLOBIN: 12.8 G/DL (ref 13–18)
IMMATURE GRANULOCYTES #: 0 K/UL
IMMATURE GRANULOCYTES %: 0.3 % (ref 0–5)
LYMPHOCYTES ABSOLUTE: 2.4 K/UL (ref 1.5–4)
LYMPHOCYTES RELATIVE PERCENT: 39.2 %
MAGNESIUM: 1.5 MG/DL (ref 1.7–2.4)
MCH RBC QN AUTO: 29.4 PG (ref 27–32)
MCHC RBC AUTO-ENTMCNC: 33.5 G/DL (ref 31–35)
MCV RBC AUTO: 87.8 FL (ref 80–100)
MONOCYTES ABSOLUTE: 0.7 K/UL (ref 0.2–0.8)
MONOCYTES RELATIVE PERCENT: 11.2 %
NEUTROPHILS ABSOLUTE: 2.8 K/UL (ref 2–7.5)
NEUTROPHILS RELATIVE PERCENT: 46.8 %
PARATHYROID HORMONE INTACT: 36.3 PG/ML (ref 14–72)
PDW BLD-RTO: 13.8 % (ref 11–16)
PERFORMED ON: ABNORMAL
PLATELET # BLD: 190 K/UL (ref 150–400)
PMV BLD AUTO: 10 FL (ref 6–10)
POTASSIUM REFLEX MAGNESIUM: 3 MMOL/L (ref 3.4–5.1)
RBC # BLD: 4.35 M/UL (ref 4.5–6)
SODIUM BLD-SCNC: 143 MMOL/L (ref 136–145)
TOTAL CK: 12 U/L (ref 26–174)
TOTAL PROTEIN: 5.2 G/DL (ref 6.4–8.3)
VITAMIN B-12: 447 PG/ML (ref 211–911)
WBC # BLD: 6 K/UL (ref 4–11)

## 2022-07-30 PROCEDURE — 6370000000 HC RX 637 (ALT 250 FOR IP): Performed by: INTERNAL MEDICINE

## 2022-07-30 PROCEDURE — 82550 ASSAY OF CK (CPK): CPT

## 2022-07-30 PROCEDURE — 82746 ASSAY OF FOLIC ACID SERUM: CPT

## 2022-07-30 PROCEDURE — 82607 VITAMIN B-12: CPT

## 2022-07-30 PROCEDURE — 1200000000 HC SEMI PRIVATE

## 2022-07-30 PROCEDURE — 36415 COLL VENOUS BLD VENIPUNCTURE: CPT

## 2022-07-30 PROCEDURE — 83970 ASSAY OF PARATHORMONE: CPT

## 2022-07-30 PROCEDURE — 82306 VITAMIN D 25 HYDROXY: CPT

## 2022-07-30 PROCEDURE — 99223 1ST HOSP IP/OBS HIGH 75: CPT | Performed by: INTERNAL MEDICINE

## 2022-07-30 PROCEDURE — 80053 COMPREHEN METABOLIC PANEL: CPT

## 2022-07-30 PROCEDURE — 85025 COMPLETE CBC W/AUTO DIFF WBC: CPT

## 2022-07-30 PROCEDURE — 93005 ELECTROCARDIOGRAM TRACING: CPT

## 2022-07-30 PROCEDURE — 6370000000 HC RX 637 (ALT 250 FOR IP): Performed by: PHYSICIAN ASSISTANT

## 2022-07-30 PROCEDURE — 6360000002 HC RX W HCPCS: Performed by: PHYSICIAN ASSISTANT

## 2022-07-30 PROCEDURE — 83735 ASSAY OF MAGNESIUM: CPT

## 2022-07-30 RX ORDER — INSULIN LISPRO 100 [IU]/ML
0-4 INJECTION, SOLUTION INTRAVENOUS; SUBCUTANEOUS NIGHTLY
Status: DISCONTINUED | OUTPATIENT
Start: 2022-07-30 | End: 2022-08-01

## 2022-07-30 RX ORDER — DEXTROSE MONOHYDRATE 100 MG/ML
INJECTION, SOLUTION INTRAVENOUS CONTINUOUS PRN
Status: DISCONTINUED | OUTPATIENT
Start: 2022-07-30 | End: 2022-08-02

## 2022-07-30 RX ORDER — POTASSIUM CHLORIDE 20 MEQ/1
40 TABLET, EXTENDED RELEASE ORAL
Status: COMPLETED | OUTPATIENT
Start: 2022-07-30 | End: 2022-07-30

## 2022-07-30 RX ORDER — DONEPEZIL HYDROCHLORIDE 5 MG/1
5 TABLET, FILM COATED ORAL NIGHTLY
Status: DISCONTINUED | OUTPATIENT
Start: 2022-07-30 | End: 2022-08-03 | Stop reason: HOSPADM

## 2022-07-30 RX ORDER — INSULIN LISPRO 100 [IU]/ML
0-4 INJECTION, SOLUTION INTRAVENOUS; SUBCUTANEOUS
Status: DISCONTINUED | OUTPATIENT
Start: 2022-07-30 | End: 2022-08-01

## 2022-07-30 RX ORDER — MAGNESIUM SULFATE IN WATER 40 MG/ML
2000 INJECTION, SOLUTION INTRAVENOUS ONCE
Status: COMPLETED | OUTPATIENT
Start: 2022-07-30 | End: 2022-07-30

## 2022-07-30 RX ADMIN — CILOSTAZOL 100 MG: 100 TABLET ORAL at 08:28

## 2022-07-30 RX ADMIN — ALOGLIPTIN 6.25 MG: 12.5 TABLET, FILM COATED ORAL at 08:28

## 2022-07-30 RX ADMIN — METOPROLOL TARTRATE 12.5 MG: 25 TABLET, FILM COATED ORAL at 20:35

## 2022-07-30 RX ADMIN — PANTOPRAZOLE SODIUM 40 MG: 40 TABLET, DELAYED RELEASE ORAL at 06:29

## 2022-07-30 RX ADMIN — POTASSIUM CHLORIDE 40 MEQ: 20 TABLET, EXTENDED RELEASE ORAL at 11:20

## 2022-07-30 RX ADMIN — TAMSULOSIN HYDROCHLORIDE 0.4 MG: 0.4 CAPSULE ORAL at 08:28

## 2022-07-30 RX ADMIN — ASPIRIN 81 MG 81 MG: 81 TABLET ORAL at 08:28

## 2022-07-30 RX ADMIN — POTASSIUM CHLORIDE 40 MEQ: 20 TABLET, EXTENDED RELEASE ORAL at 14:25

## 2022-07-30 RX ADMIN — DONEPEZIL HYDROCHLORIDE 5 MG: 5 TABLET, FILM COATED ORAL at 20:35

## 2022-07-30 RX ADMIN — ENOXAPARIN SODIUM 40 MG: 100 INJECTION SUBCUTANEOUS at 08:28

## 2022-07-30 RX ADMIN — CILOSTAZOL 100 MG: 100 TABLET ORAL at 20:35

## 2022-07-30 RX ADMIN — QUETIAPINE FUMARATE 25 MG: 25 TABLET ORAL at 20:35

## 2022-07-30 RX ADMIN — POTASSIUM CHLORIDE 40 MEQ: 20 TABLET, EXTENDED RELEASE ORAL at 08:31

## 2022-07-30 RX ADMIN — METOPROLOL TARTRATE 12.5 MG: 25 TABLET, FILM COATED ORAL at 08:28

## 2022-07-30 RX ADMIN — ROSUVASTATIN 20 MG: 20 TABLET, FILM COATED ORAL at 08:28

## 2022-07-30 RX ADMIN — MAGNESIUM SULFATE HEPTAHYDRATE 2000 MG: 40 INJECTION, SOLUTION INTRAVENOUS at 11:10
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
## 2022-07-30 NOTE — FLOWSHEET NOTE
Received report from [facility] Pt to floor via W/C and placed in room 2. Assessment complete. Pt denies pain. States fx ribs when he fell a month ago. R knee slight red area, but no open areas noted. VSS. NAD noted at this time. Will continue to monitor.

## 2022-07-30 NOTE — PLAN OF CARE
Problem: Safety - Adult  Goal: Free from fall injury  Note: Pt will remain free from fall this admission

## 2022-07-31 LAB
A/G RATIO: 1.7 (ref 0.8–2)
ALBUMIN SERPL-MCNC: 3.6 G/DL (ref 3.4–4.8)
ALP BLD-CCNC: 60 U/L (ref 25–100)
ALT SERPL-CCNC: 17 U/L (ref 4–36)
AMMONIA: 25 MCG/DL (ref 27–102)
ANION GAP SERPL CALCULATED.3IONS-SCNC: 9 MMOL/L (ref 3–16)
AST SERPL-CCNC: 16 U/L (ref 8–33)
BASOPHILS ABSOLUTE: 0.1 K/UL (ref 0–0.1)
BASOPHILS RELATIVE PERCENT: 0.7 %
BILIRUB SERPL-MCNC: 0.7 MG/DL (ref 0.3–1.2)
BUN BLDV-MCNC: 12 MG/DL (ref 6–20)
CALCIUM SERPL-MCNC: 10.2 MG/DL (ref 8.5–10.5)
CHLORIDE BLD-SCNC: 113 MMOL/L (ref 98–107)
CO2: 23 MMOL/L (ref 20–30)
CREAT SERPL-MCNC: 1.2 MG/DL (ref 0.4–1.2)
EOSINOPHILS ABSOLUTE: 0.1 K/UL (ref 0–0.4)
EOSINOPHILS RELATIVE PERCENT: 1.3 %
GFR AFRICAN AMERICAN: >59
GFR NON-AFRICAN AMERICAN: 58
GLOBULIN: 2.1 G/DL
GLUCOSE BLD-MCNC: 101 MG/DL (ref 74–106)
GLUCOSE BLD-MCNC: 114 MG/DL (ref 74–106)
GLUCOSE BLD-MCNC: 122 MG/DL (ref 74–106)
GLUCOSE BLD-MCNC: 151 MG/DL (ref 74–106)
GLUCOSE BLD-MCNC: 249 MG/DL (ref 74–106)
HCT VFR BLD CALC: 41.6 % (ref 40–54)
HEMOGLOBIN: 13.8 G/DL (ref 13–18)
IMMATURE GRANULOCYTES #: 0 K/UL
IMMATURE GRANULOCYTES %: 0.6 % (ref 0–5)
LYMPHOCYTES ABSOLUTE: 2.3 K/UL (ref 1.5–4)
LYMPHOCYTES RELATIVE PERCENT: 33.1 %
MAGNESIUM: 2 MG/DL (ref 1.7–2.4)
MCH RBC QN AUTO: 29.3 PG (ref 27–32)
MCHC RBC AUTO-ENTMCNC: 33.2 G/DL (ref 31–35)
MCV RBC AUTO: 88.3 FL (ref 80–100)
MONOCYTES ABSOLUTE: 0.6 K/UL (ref 0.2–0.8)
MONOCYTES RELATIVE PERCENT: 9.2 %
NEUTROPHILS ABSOLUTE: 3.8 K/UL (ref 2–7.5)
NEUTROPHILS RELATIVE PERCENT: 55.1 %
PDW BLD-RTO: 14.1 % (ref 11–16)
PERFORMED ON: ABNORMAL
PERFORMED ON: NORMAL
PLATELET # BLD: 206 K/UL (ref 150–400)
PMV BLD AUTO: 9.7 FL (ref 6–10)
POTASSIUM SERPL-SCNC: 4 MMOL/L (ref 3.4–5.1)
RBC # BLD: 4.71 M/UL (ref 4.5–6)
SODIUM BLD-SCNC: 145 MMOL/L (ref 136–145)
TOTAL PROTEIN: 5.7 G/DL (ref 6.4–8.3)
TSH SERPL DL<=0.05 MIU/L-ACNC: 0.58 UIU/ML (ref 0.27–4.2)
WBC # BLD: 6.9 K/UL (ref 4–11)

## 2022-07-31 PROCEDURE — 85025 COMPLETE CBC W/AUTO DIFF WBC: CPT

## 2022-07-31 PROCEDURE — 36415 COLL VENOUS BLD VENIPUNCTURE: CPT

## 2022-07-31 PROCEDURE — 1200000000 HC SEMI PRIVATE

## 2022-07-31 PROCEDURE — 99232 SBSQ HOSP IP/OBS MODERATE 35: CPT | Performed by: INTERNAL MEDICINE

## 2022-07-31 PROCEDURE — 6370000000 HC RX 637 (ALT 250 FOR IP): Performed by: PHYSICIAN ASSISTANT

## 2022-07-31 PROCEDURE — 84443 ASSAY THYROID STIM HORMONE: CPT

## 2022-07-31 PROCEDURE — 6360000002 HC RX W HCPCS: Performed by: PHYSICIAN ASSISTANT

## 2022-07-31 PROCEDURE — 80053 COMPREHEN METABOLIC PANEL: CPT

## 2022-07-31 PROCEDURE — 83735 ASSAY OF MAGNESIUM: CPT

## 2022-07-31 PROCEDURE — 82140 ASSAY OF AMMONIA: CPT

## 2022-07-31 PROCEDURE — 2500000003 HC RX 250 WO HCPCS: Performed by: PHYSICIAN ASSISTANT

## 2022-07-31 PROCEDURE — 2580000003 HC RX 258: Performed by: PHYSICIAN ASSISTANT

## 2022-07-31 PROCEDURE — 93005 ELECTROCARDIOGRAM TRACING: CPT

## 2022-07-31 RX ORDER — LOSARTAN POTASSIUM 25 MG/1
25 TABLET ORAL DAILY
Status: DISCONTINUED | OUTPATIENT
Start: 2022-07-31 | End: 2022-08-01

## 2022-07-31 RX ADMIN — INSULIN LISPRO 1 UNITS: 100 INJECTION, SOLUTION INTRAVENOUS; SUBCUTANEOUS at 17:05

## 2022-07-31 RX ADMIN — ENOXAPARIN SODIUM 40 MG: 100 INJECTION SUBCUTANEOUS at 08:09

## 2022-07-31 RX ADMIN — ASPIRIN 81 MG 81 MG: 81 TABLET ORAL at 08:08

## 2022-07-31 RX ADMIN — DONEPEZIL HYDROCHLORIDE 5 MG: 5 TABLET, FILM COATED ORAL at 20:35

## 2022-07-31 RX ADMIN — CILOSTAZOL 100 MG: 100 TABLET ORAL at 08:08

## 2022-07-31 RX ADMIN — ALOGLIPTIN 6.25 MG: 12.5 TABLET, FILM COATED ORAL at 08:08

## 2022-07-31 RX ADMIN — METOPROLOL TARTRATE 12.5 MG: 25 TABLET, FILM COATED ORAL at 20:35

## 2022-07-31 RX ADMIN — METOPROLOL TARTRATE 12.5 MG: 25 TABLET, FILM COATED ORAL at 08:09

## 2022-07-31 RX ADMIN — CILOSTAZOL 100 MG: 100 TABLET ORAL at 20:35

## 2022-07-31 RX ADMIN — TAMSULOSIN HYDROCHLORIDE 0.4 MG: 0.4 CAPSULE ORAL at 08:08

## 2022-07-31 RX ADMIN — FOLIC ACID: 5 INJECTION, SOLUTION INTRAMUSCULAR; INTRAVENOUS; SUBCUTANEOUS at 12:05

## 2022-07-31 RX ADMIN — LOSARTAN POTASSIUM 25 MG: 25 TABLET, FILM COATED ORAL at 08:12

## 2022-07-31 RX ADMIN — PANTOPRAZOLE SODIUM 40 MG: 40 TABLET, DELAYED RELEASE ORAL at 06:08

## 2022-07-31 RX ADMIN — QUETIAPINE FUMARATE 25 MG: 25 TABLET ORAL at 20:35

## 2022-07-31 RX ADMIN — ROSUVASTATIN 20 MG: 20 TABLET, FILM COATED ORAL at 08:09

## 2022-07-31 NOTE — FLOWSHEET NOTE
07/30/22 5300   Assessment   Charting Type Shift assessment   Psychosocial   Psychosocial (WDL) WDL   Patient Behaviors Anxious; Verbal   Neurological   Neuro (WDL) WDL   Level of Consciousness Alert (0)   Hosmer Coma Scale   Eye Opening 4   Best Verbal Response 5   Best Motor Response 6   Hosmer Coma Scale Score 15   HEENT (Head, Ears, Eyes, Nose, & Throat)   HEENT (WDL) X   Right Eye Glasses   Left Eye Glasses   Right Ear Hearing aid; Impaired hearing   Left Ear Hearing aid; Impaired hearing   Teeth Missing teeth   Respiratory   Respiratory (WDL) WDL   Cardiac   Cardiac (WDL) WDL   Rhythm Interpretation   Heart Rate 67   Gastrointestinal   Abdominal (WDL) WDL   Genitourinary   Genitourinary (WDL) WDL   Peripheral Vascular   Peripheral Vascular (WDL) WDL   Skin Integumentary    Skin Integumentary (WDL) X   Location r knee   Musculoskeletal   Musculoskeletal (WDL) X   RL Extremity Weakness   LL Extremity Weakness   Care Plan - Discharge Planning Goals   Discharge to home or other facility with appropriate resources Arrange for needed discharge resources and transportation as appropriate; Identify discharge learning needs (meds, wound care, etc)

## 2022-07-31 NOTE — PLAN OF CARE
Problem: Discharge Planning  Goal: Discharge to home or other facility with appropriate resources  Outcome: Progressing  Flowsheets (Taken 7/30/2022 2215 by Darrius Willard RN)  Discharge to home or other facility with appropriate resources:   Arrange for needed discharge resources and transportation as appropriate   Identify discharge learning needs (meds, wound care, etc)     Problem: Safety - Adult  Goal: Free from fall injury  Outcome: Progressing

## 2022-07-31 NOTE — PROGRESS NOTES
Progress Note      Subjective:   Chief complaint: weakness, fall, memory problem     Interval History:   Resting in bed this morning. Says he is feeling better overall. Labs improved. BP elevated this morning. He wants to go home soon but agreeable to staying overnight for PT/OT eval tomorrow. Admits that he feels frustrated when he can't seem to say what he wants or do what he wants and also that his memory seems to be worsening. Review of systems:   Constitutional:  Denies fever or chills. Positive for weight loss, fatigue, generalized weakness, declining functional status. Eyes:  Denies eye pain or redness  HENT:  Denies nasal congestion or sore throat  Respiratory:  Denies cough or shortness of breath  Cardiovascular:  Denies chest pain or edema  GI:  Denies abdominal pain, nausea, vomiting, bloody stools or diarrhea  :  Denies dysuria or frequency  Musculoskeletal:  Denies acute neck pain or body aches. Positive for chronic back pain. Integument:  Denies rash or itching  Neurologic:  Denies headache, dizziness, numbness, tingling or unilateral weakness. Positive for memory problem. Psychiatric:  Denies acute depression or acute anxiety    Past medical history, surgical history, family history and social history reviewed and unchanged compared to H&P earlier this admission.     Medications:   Scheduled Meds:   losartan  25 mg Oral Daily    insulin lispro  0-4 Units SubCUTAneous TID WC    insulin lispro  0-4 Units SubCUTAneous Nightly    donepezil  5 mg Oral Nightly    aspirin  81 mg Oral Daily    cilostazol  100 mg Oral BID    metoprolol tartrate  12.5 mg Oral BID    pantoprazole  40 mg Oral QAM AC    QUEtiapine  25 mg Oral Nightly    rosuvastatin  20 mg Oral Daily    alogliptin  6.25 mg Oral Daily    tamsulosin  0.4 mg Oral Daily    enoxaparin  40 mg SubCUTAneous Daily    nicotine  1 patch TransDERmal Daily     Continuous Infusions:   dextrose         Objective:     Vital Signs  Temp: 97.3 °F (36.3 °C)  Heart Rate: 55  Resp: 18  BP: (!) 181/75  SpO2: 98 %  O2 Device: None (Room air)       Vital signs reviewed in electronic charts. Physical exam  Constitutional:  Well developed, thin elderly gentleman in no acute distress  Eyes:  PERRL, no scleral icterus, conjunctiva normal  HENT:  Atraumatic, external ears normal, nose normal, oropharynx moist, no pharyngeal exudates. Neck- supple, no JVD, no lymphadenopathy  Respiratory:  No respiratory distress on RA, no wheezing, rales or rhonchi detected  Cardiovascular:  Normal rate, normal rhythm, no murmurs, no gallops, no rubs, trace ble edema  GI:  Soft, thin, nondistended, normal bowel sounds, nontender, no voluntary guarding  Musculoskeletal:  No cyanosis or obvious acute deformity. Moving all extremities with mild generalized weakness throughout  Integument:  Warm and dry. Neurologic:  Alert & oriented x to self, place, situation. Answers questions appropriately. Memory problem noted.  strength intact and equal bilaterally.  no apparent focal deficits noted  Psychiatric:  Speech and behavior appropriate        Results:     Lab Results   Component Value Date    WBC 6.9 07/31/2022    HGB 13.8 07/31/2022    HCT 41.6 07/31/2022    MCV 88.3 07/31/2022     07/31/2022       Lab Results   Component Value Date/Time     07/31/2022 05:50 AM    K 4.0 07/31/2022 05:50 AM    K 3.0 07/30/2022 04:42 AM     07/31/2022 05:50 AM    CO2 23 07/31/2022 05:50 AM    BUN 12 07/31/2022 05:50 AM    CREATININE 1.2 07/31/2022 05:50 AM    GLUCOSE 114 07/31/2022 05:50 AM    CALCIUM 10.2 07/31/2022 05:50 AM        Assessment and Plan:      AMS (altered mental status) [R41.82]  - suspect related to electrolyte abnormalities including hypercalcemia in setting of dehydration and ?dementia  - hydrated with IVFs and rally pack  - replace electrolytes as needed  - 7/30 added aricept 5 mg nightly for suspected dementia  - fall precautions   - mental status improved and seems Diabetes mellitus, type II ([facility] [address] [E11.9]  - A1C 7.9 4/4/22  - continue renally dosed nesina  - monitor fsbg achs and cover with low dose ssi as needed    03/28/2011    Other specified hypothyroidism [E03.8]  - TSH wnl  - no levothyroxine noted on current home med list although has previously been prescribed. Recommend ongoing tsh monitoring with pcp. 03/28/2011     Patient was seen and examined with Dr. Elan Blanco. After reviewing patient data and diagnostic testing the plan of care was established in conjunction with Dr. Elan Blanco.     Electronically signed by MORRIS Blanton on 7/31/2022 at 10:46 AM

## 2022-07-31 NOTE — FLOWSHEET NOTE
07/31/22 0858   Assessment   Charting Type Shift assessment   Psychosocial   Psychosocial (WDL) WDL   Patient Behaviors Anxious; Verbal   Neurological   Neuro (WDL) WDL   Level of Consciousness Alert (0)   [place] Coma Scale   Eye Opening 4   Best Verbal Response 5   Best Motor Response 6   [place] Coma Scale Score 15   HEENT (Head, Ears, Eyes, Nose, & Throat)   HEENT (WDL) X   Right Eye Glasses   Left Eye Glasses   Right Ear Hearing aid; Impaired hearing   Left Ear Hearing aid; Impaired hearing   Teeth Missing teeth   Respiratory   Respiratory (WDL) WDL   Cardiac   Cardiac (WDL) WDL   Cardiac Monitor   Telemetry Box Number    Gastrointestinal   Abdominal (WDL) WDL   Genitourinary   Genitourinary (WDL) WDL   Peripheral Vascular   Peripheral Vascular (WDL) WDL   Skin Integumentary    Skin Integumentary (WDL) X   Skin Color Ecchymosis (comment)  (scattered)   Musculoskeletal   Musculoskeletal (WDL) X   RL Extremity Weakness   LL Extremity Weakness

## 2022-08-01 PROBLEM — F03.918 DEMENTIA WITH BEHAVIORAL DISTURBANCE: Status: ACTIVE | Noted: 2022-08-01

## 2022-08-01 LAB
A/G RATIO: 1.5 (ref 0.8–2)
ALBUMIN SERPL-MCNC: 3.4 G/DL (ref 3.4–4.8)
ALP BLD-CCNC: 66 U/L (ref 25–100)
ALT SERPL-CCNC: 16 U/L (ref 4–36)
ANION GAP SERPL CALCULATED.3IONS-SCNC: 10 MMOL/L (ref 3–16)
AST SERPL-CCNC: 14 U/L (ref 8–33)
BASOPHILS ABSOLUTE: 0 K/UL (ref 0–0.1)
BASOPHILS RELATIVE PERCENT: 0.6 %
BILIRUB SERPL-MCNC: 0.6 MG/DL (ref 0.3–1.2)
BUN BLDV-MCNC: 10 MG/DL (ref 6–20)
CALCIUM SERPL-MCNC: 10.2 MG/DL (ref 8.5–10.5)
CHLORIDE BLD-SCNC: 111 MMOL/L (ref 98–107)
CO2: 21 MMOL/L (ref 20–30)
CREAT SERPL-MCNC: 1.3 MG/DL (ref 0.4–1.2)
EOSINOPHILS ABSOLUTE: 0.1 K/UL (ref 0–0.4)
EOSINOPHILS RELATIVE PERCENT: 1.8 %
GFR AFRICAN AMERICAN: >59
GFR NON-AFRICAN AMERICAN: 53
GLOBULIN: 2.2 G/DL
GLUCOSE BLD-MCNC: 139 MG/DL (ref 74–106)
GLUCOSE BLD-MCNC: 142 MG/DL (ref 74–106)
GLUCOSE BLD-MCNC: 149 MG/DL (ref 74–106)
GLUCOSE BLD-MCNC: 168 MG/DL (ref 74–106)
HCT VFR BLD CALC: 41.7 % (ref 40–54)
HEMOGLOBIN: 13.9 G/DL (ref 13–18)
IMMATURE GRANULOCYTES #: 0 K/UL
IMMATURE GRANULOCYTES %: 0.4 % (ref 0–5)
LYMPHOCYTES ABSOLUTE: 2.1 K/UL (ref 1.5–4)
LYMPHOCYTES RELATIVE PERCENT: 29.4 %
MCH RBC QN AUTO: 29.2 PG (ref 27–32)
MCHC RBC AUTO-ENTMCNC: 33.3 G/DL (ref 31–35)
MCV RBC AUTO: 87.6 FL (ref 80–100)
MONOCYTES ABSOLUTE: 0.7 K/UL (ref 0.2–0.8)
MONOCYTES RELATIVE PERCENT: 9.1 %
NEUTROPHILS ABSOLUTE: 4.2 K/UL (ref 2–7.5)
NEUTROPHILS RELATIVE PERCENT: 58.7 %
PDW BLD-RTO: 14.1 % (ref 11–16)
PERFORMED ON: ABNORMAL
PLATELET # BLD: 206 K/UL (ref 150–400)
PMV BLD AUTO: 9.3 FL (ref 6–10)
POTASSIUM SERPL-SCNC: 3.5 MMOL/L (ref 3.4–5.1)
RBC # BLD: 4.76 M/UL (ref 4.5–6)
SODIUM BLD-SCNC: 142 MMOL/L (ref 136–145)
TOTAL PROTEIN: 5.6 G/DL (ref 6.4–8.3)
VITAMIN D 25-HYDROXY: 41.7 (ref 32–100)
WBC # BLD: 7.2 K/UL (ref 4–11)

## 2022-08-01 PROCEDURE — 97165 OT EVAL LOW COMPLEX 30 MIN: CPT

## 2022-08-01 PROCEDURE — 1200000000 HC SEMI PRIVATE

## 2022-08-01 PROCEDURE — 36415 COLL VENOUS BLD VENIPUNCTURE: CPT

## 2022-08-01 PROCEDURE — 97802 MEDICAL NUTRITION INDIV IN: CPT

## 2022-08-01 PROCEDURE — 97116 GAIT TRAINING THERAPY: CPT

## 2022-08-01 PROCEDURE — 97161 PT EVAL LOW COMPLEX 20 MIN: CPT

## 2022-08-01 PROCEDURE — 85025 COMPLETE CBC W/AUTO DIFF WBC: CPT

## 2022-08-01 PROCEDURE — 6370000000 HC RX 637 (ALT 250 FOR IP): Performed by: PHYSICIAN ASSISTANT

## 2022-08-01 PROCEDURE — 97530 THERAPEUTIC ACTIVITIES: CPT

## 2022-08-01 PROCEDURE — 80053 COMPREHEN METABOLIC PANEL: CPT

## 2022-08-01 PROCEDURE — 99231 SBSQ HOSP IP/OBS SF/LOW 25: CPT | Performed by: INTERNAL MEDICINE

## 2022-08-01 PROCEDURE — 6360000002 HC RX W HCPCS: Performed by: PHYSICIAN ASSISTANT

## 2022-08-01 RX ORDER — LOSARTAN POTASSIUM 50 MG/1
50 TABLET ORAL DAILY
Status: DISCONTINUED | OUTPATIENT
Start: 2022-08-02 | End: 2022-08-03 | Stop reason: HOSPADM

## 2022-08-01 RX ORDER — POTASSIUM CHLORIDE 750 MG/1
40 CAPSULE, EXTENDED RELEASE ORAL ONCE
Status: COMPLETED | OUTPATIENT
Start: 2022-08-01 | End: 2022-08-01

## 2022-08-01 RX ORDER — ERGOCALCIFEROL 1.25 MG/1
50000 CAPSULE ORAL WEEKLY
Status: DISCONTINUED | OUTPATIENT
Start: 2022-08-01 | End: 2022-08-03 | Stop reason: HOSPADM

## 2022-08-01 RX ADMIN — METOPROLOL TARTRATE 12.5 MG: 25 TABLET, FILM COATED ORAL at 08:13

## 2022-08-01 RX ADMIN — ROSUVASTATIN 20 MG: 20 TABLET, FILM COATED ORAL at 08:23

## 2022-08-01 RX ADMIN — ERGOCALCIFEROL 50000 UNITS: 1.25 CAPSULE ORAL at 17:20

## 2022-08-01 RX ADMIN — TAMSULOSIN HYDROCHLORIDE 0.4 MG: 0.4 CAPSULE ORAL at 08:12

## 2022-08-01 RX ADMIN — LOSARTAN POTASSIUM 25 MG: 25 TABLET, FILM COATED ORAL at 08:12

## 2022-08-01 RX ADMIN — ASPIRIN 81 MG 81 MG: 81 TABLET ORAL at 08:13

## 2022-08-01 RX ADMIN — ALOGLIPTIN 6.25 MG: 12.5 TABLET, FILM COATED ORAL at 08:12

## 2022-08-01 RX ADMIN — CILOSTAZOL 100 MG: 100 TABLET ORAL at 08:13

## 2022-08-01 RX ADMIN — PANTOPRAZOLE SODIUM 40 MG: 40 TABLET, DELAYED RELEASE ORAL at 06:35

## 2022-08-01 RX ADMIN — METOPROLOL TARTRATE 12.5 MG: 25 TABLET, FILM COATED ORAL at 20:25

## 2022-08-01 RX ADMIN — DONEPEZIL HYDROCHLORIDE 5 MG: 5 TABLET, FILM COATED ORAL at 20:25

## 2022-08-01 RX ADMIN — QUETIAPINE FUMARATE 25 MG: 25 TABLET ORAL at 20:25

## 2022-08-01 RX ADMIN — ENOXAPARIN SODIUM 40 MG: 100 INJECTION SUBCUTANEOUS at 08:13

## 2022-08-01 RX ADMIN — POTASSIUM CHLORIDE 40 MEQ: 10 CAPSULE, COATED, EXTENDED RELEASE ORAL at 11:39

## 2022-08-01 RX ADMIN — CILOSTAZOL 100 MG: 100 TABLET ORAL at 20:25

## 2022-08-01 NOTE — CONSULTS
Comprehensive Nutrition Assessment    Type and Reason for Visit:  Patient Education, Initial, Positive Nutrition Screen    Nutrition Recommendations/Plan:   Recommend to continue with current diet. Encouraged patient and family if patient not eating 50% of meals to please take ONS. Malnutrition Assessment:  Malnutrition Status: At risk for malnutrition (Comment) (related to poor intakes at home, weight loss and mild fat loss) (08/01/22 6768)    Context:  Acute Illness     Findings of the 6 clinical characteristics of malnutrition:  Energy Intake:  Mild decrease in energy intake (Comment)  Weight Loss:  No significant weight loss     Body Fat Loss:  Mild body fat loss Orbital, Buccal region   Muscle Mass Loss:  Unable to assess    Fluid Accumulation:  No significant fluid accumulation     Strength:       Nutrition Assessment:    Pt admitted with hypkalemia, unintentional weight loss, poor appetite. Pt at moderate risk for ongoing nutritional compromise. Nutrition Related Findings:    Pt presents with unintentional weight loss, but not significant amount in past 2 months, but concerning for decreased appetite. does have some fat loss, bags under eyes. Presented with renal failure and hypokalemia but has been corrected. Has trouble swallowing and on soft and bite sized diet. wants to go home. Wound Type: None       Current Nutrition Intake & Therapies:    Average Meal Intake: 51-75%, % (61-88% x past 4 meals.)     ADULT DIET; Dysphagia - Soft and Bite Sized; 3 carb choices (45 gm/meal)    Anthropometric Measures:  Height: 5' 8\" (172.7 cm)  Ideal Body Weight (IBW): 154 lbs (70 kg)       Current Body Weight: 157 lb (71.2 kg), 101.9 % IBW.  Weight Source: Stated  Current BMI (kg/m2): 23.9  Usual Body Weight: 161 lb 12.8 oz (73.4 kg)  % Weight Change (Calculated): -3                    BMI Categories: Normal Weight (BMI 22.0 to 24.9) age over 72    Estimated Daily Nutrient Needs:  Energy Requirements Based On: Kcal/kg  Weight Used for Energy Requirements: Current  Energy (kcal/day): 2495-0894 (27-30 kcal/kg cbw)  Weight Used for Protein Requirements: Current  Protein (g/day): 71-85 (1-1.2 gm/kg cbw)  Method Used for Fluid Requirements: 1 ml/kcal  Fluid (ml/day): 7489-7858    Nutrition Diagnosis:   Predicted inadequate energy intake related to swallowing difficulty, renal dysfunction as evidenced by poor intake prior to admission, weight loss, mild loss of subcutaneous fat  Food & Nutrition-related knowledge deficit related to renal dysfunction as evidenced by lab values    Nutrition Interventions:   Food and/or Nutrient Delivery: Continue Current Diet, Start Oral Nutrition Supplement  Nutrition Education/Counseling: Education needed, Education initiated, Education completed (spoke to patient and family about ONS and eating more high potassium foods.)  Coordination of Nutrition Care: Continue to monitor while inpatient  Plan of Care discussed with: patient, family, and rn    Goals:     Goals: Meet at least 75% of estimated needs       Nutrition Monitoring and Evaluation:   Behavioral-Environmental Outcomes: Readiness for Change, Beliefs and Attitutes  Food/Nutrient Intake Outcomes: Food and Nutrient Intake  Physical Signs/Symptoms Outcomes: Biochemical Data, Weight    Discharge Planning:    Continue current diet     Brittney Duran, MS, RD, LD

## 2022-08-01 NOTE — CARE COORDINATION
Daughter, Celestine Colon ([phone]), is willing to take guardianship. Educated that she will have to go through the 's office to get emergency guardianship. CM to fax letter of necessity for emergent guardian to 's office.

## 2022-08-01 NOTE — FLOWSHEET NOTE
07/31/22 3167   Assessment   Charting Type Shift assessment   Psychosocial   Psychosocial (WDL) WDL   Patient Behaviors Anxious; Verbal   Neurological   Neuro (WDL) WDL   Level of Consciousness Alert (0)   [place] Coma Scale   Eye Opening 4   Best Verbal Response 5   Best Motor Response 6   [place] Coma Scale Score 15   HEENT (Head, Ears, Eyes, Nose, & Throat)   HEENT (WDL) X   Right Eye Glasses   Left Eye Glasses   Right Ear Hearing aid; Impaired hearing   Left Ear Hearing aid; Impaired hearing   Respiratory   Respiratory (WDL) WDL   Cardiac   Cardiac (WDL) WDL   Rhythm Interpretation   Heart Rate 80   Gastrointestinal   Abdominal (WDL) WDL   Genitourinary   Genitourinary (WDL) WDL   Peripheral Vascular   Peripheral Vascular (WDL) WDL   Skin Integumentary    Skin Integumentary (WDL) X   Skin Color Ecchymosis (comment)  (scattered)   Musculoskeletal   RL Extremity Weakness   LL Extremity Weakness   Care Plan - Discharge Planning Goals   Discharge to home or other facility with appropriate resources Arrange for needed discharge resources and transportation as appropriate; Identify discharge learning needs (meds, wound care, etc)

## 2022-08-01 NOTE — CARE COORDINATION
CM spoke with family. Daughter, Ese Pino, is willing to get guardianship. Requesting NHP at [facility] (near family). Agreeable to [address] if [facility] is not able to take.   CM to send referral to [facility] [place] [facility]

## 2022-08-01 NOTE — PROGRESS NOTES
Progress Note      Subjective:   Chief complaint:   Weakness, recurrent falls  Dementia with behavioral disturbances    Interval History:   Patient seen and examined this morning. He was ambulating with his walker in his room independently. Wife at bedside. Wife reports patient has had episodes of agitation and combativeness at home. She states she is no longer able to care for him at home. Daughter trying to obtain guardianship and request nursing home placement. Case management aware. Referral sent. Review of systems:   Constitutional:  Denies fever or chills. Positive for weight loss, fatigue, generalized weakness and declining functional status. Eyes:  Denies change in visual acuity or discharge. HENT:  Denies nasal congestion or sore throat. Respiratory:  Denies cough or shortness of breath. Cardiovascular:  Denies chest pain, palpitation or swelling in LEs. GI:  Denies abdominal pain, nausea, vomiting, bloody stools or diarrhea. :  Denies dysuria or frequency. Musculoskeletal: Positive for chronic back pain and arthralgias. Integument:  Denies rash or itching. Neurologic:  Denies headache, focal weakness or sensory changes. Positive for memory problem. Endocrine:  Denies polyuria or polydipsia. Lymphatic:  Denies swollen glands or night sweats. Psychiatric:  Denies depression or anxiety. Past medical history, surgical history, family history and social history reviewed and unchanged compared to H&P earlier this admission.     Medications:   Scheduled Meds:   [START ON 8/2/2022] losartan  50 mg Oral Daily    insulin lispro  0-4 Units SubCUTAneous TID WC    insulin lispro  0-4 Units SubCUTAneous Nightly    donepezil  5 mg Oral Nightly    aspirin  81 mg Oral Daily    cilostazol  100 mg Oral BID    metoprolol tartrate  12.5 mg Oral BID    pantoprazole  40 mg Oral QAM AC    QUEtiapine  25 mg Oral Nightly    rosuvastatin  20 mg Oral Daily    alogliptin  6.25 mg Oral Daily    tamsulosin 0.4 mg Oral Daily    enoxaparin  40 mg SubCUTAneous Daily    nicotine  1 patch TransDERmal Daily     Continuous Infusions:   dextrose         Objective:     Vital Signs  Temp: 97.7 °F (36.5 °C)  Heart Rate: 65  Resp: 18  BP: (!) 165/80  SpO2: 98 %  O2 Device: None (Room air)       Vital signs reviewed in electronic charts. Physical exam  Constitutional:  Well developed, thin elderly gentleman in no acute distress  Eyes:  no scleral icterus, conjunctiva normal  HENT:  Atraumatic, external ears normal, nose normal, oropharynx moist, no pharyngeal exudates. Neck- supple, no JVD, no lymphadenopathy  Respiratory:  No respiratory distress on RA, no wheezing, rales or rhonchi detected  Cardiovascular:  Normal rate, normal rhythm, no murmurs, no gallops, no rubs, trace ble edema  GI:  Soft, thin, nondistended, normal bowel sounds, nontender, no voluntary guarding  Musculoskeletal:  No cyanosis or obvious acute deformity. Moving all extremities with mild generalized weakness throughout  Integument:  Warm and dry. Neurologic:  Alert & oriented x to self, place, situation. Answers questions appropriately. Memory problem noted.  strength intact and equal bilaterally. no apparent focal deficits noted  Psychiatric:  Speech and behavior appropriate     Results:     Lab Results   Component Value Date    WBC 7.2 08/01/2022    HGB 13.9 08/01/2022    HCT 41.7 08/01/2022    MCV 87.6 08/01/2022     08/01/2022       Lab Results   Component Value Date/Time     08/01/2022 05:45 AM    K 3.5 08/01/2022 05:45 AM    K 3.0 07/30/2022 04:42 AM     08/01/2022 05:45 AM    CO2 21 08/01/2022 05:45 AM    BUN 10 08/01/2022 05:45 AM    CREATININE 1.3 08/01/2022 05:45 AM    GLUCOSE 149 08/01/2022 05:45 AM    CALCIUM 10.2 08/01/2022 05:45 AM        Assessment and Plan:      Active Hospital Problems    Diagnosis Date Noted    Dementia with behavioral disturbance ([facility] [address] [F03.91]  -As per HPI, wife reports patient with increased agitation and combativeness at home. She is no longer able to care for him and is requesting nursing home placement. Daughter plans to apply for guardianship today.  -Continue Aricept, Seroquel   08/01/2022    AMS (altered mental status) [R41.82]  -suspect due to progression of known dementia  -No evidence of acute infection  -CT head 7/29 with no acute intracranial pathology  -Admission UDS negative  -TSH, B12 and folate within normal limits  -Ammonia 25   07/30/2022    Hypercalcemia [E83.52]  -Ca 11.7 on 7/29; PTH 36.3   -pt hydrated with resolution.   -resolved  -encourage good po intake   07/29/2022    Hypomagnesemia [E83.42]  -replaced  -monitor and replete PRN   06/03/2022    Spinal stenosis [M48.00]  -chronic  -PT OT following; patient able to ambulate with assistance of walker   06/02/2022    Weight loss [R63.4]  -Patient and patient's family report ongoing weight loss over the last several months. They also report decreased oral intake.   -CT chest in January 2022 with no findings of occult malignancy  -CT abdomen pelvis 6/22 without acute finding or acute malignancy  -PSA within normal limits  -Could consider outpatient EGD and colonoscopy to evaluate for causes of weight loss/occult malignancy; will defer to PCP   -dietitian following   06/02/2022    History of stroke [Z86.73]  -Continue aspirin, Crestor   04/05/2022    Hypokalemia [E87.6]  -Replaced  -Monitor and replete as needed   04/05/2022    Peripheral vascular disease ([facility] [address] [I73.9]  -Continue aspirin, Pletal and Crestor   04/05/2022    Acute kidney injury superimposed on CKD ([facility] [address] [N17.9, N18.9]  -Serum creatinine 1.7 on admission; serum creatinine down trended to 1.3 after IV fluids and improved p.o. intake  -Avoid nephrotoxins and NSAIDs  -Encourage good p.o. intake  -Suspect MARIE is prerenal secondary to dehydration from poor p.o. intake   02/01/2022    Generalized weakness [R53.1]  -Suspect secondary to dehydration, electrolyte abnormalities, dementia and advanced age  -PT OT following  -B12, folate, TSH are within normal limits  -Fall precautions   02/01/2022    Benign essential HTN [I10]  Blood pressure noted to be elevated despite home regimen of metoprolol and losartan. Will increase losartan today and monitor. 03/28/2011    Diabetes mellitus, type II ([facility] [address] [E11.9]  -A1c 7.9 on 4/4/2022  -Continue Nesina  -dc SSI and accuchecks  -diabetic diet   03/28/2011    Other specified hypothyroidism [E03.8]  -TSH 0.58  -Continue Synthroid    Vitamin d deficiency  -add vitamin d supplement 8/1 03/28/2011     Patient was seen and examined by Dr. Jose Mccormack and plan of care reviewed. Treatment plan was formulated collaboratively.       Electronically signed by MORRIS Eisenberg on 8/1/2022 at 12:29 PM

## 2022-08-01 NOTE — PROGRESS NOTES
Balance training, Endurance training, Self-Care / ADL, Patient/Caregiver education & training     Restrictions  Restrictions/Precautions  Restrictions/Precautions: Fall Risk, General Precautions  Required Braces or Orthoses?: No    Subjective   General  Chart Reviewed: Yes  Patient assessed for rehabilitation services?: Yes  Family / Caregiver Present: No  Referring Practitioner: MORRIS Michael  Diagnosis: Hypercalcemia  Subjective  Subjective: Pt agreeable to OT services. Pt upset that he lost ring last night. Pt states he came to hospital because he had a stroke but \"Its alright it doesn't bother me anymore. \"     Social/Functional History  Social/Functional History  Lives With: Spouse  Type of Home: House  Home Layout: Two level, Able to Live on Main level with bedroom/bathroom, Performs ADL's on one level  Home Access: Stairs to enter with rails  Entrance Stairs - Number of Steps: 6  Bathroom Shower/Tub: Tub/Shower unit  Bathroom Toilet: Standard  Bathroom Equipment: Shower chair, Grab bars around toilet, 3-in-1 commode  Bathroom Accessibility: Walker accessible  Home Equipment: [address] bed, Lift chair, Oxygen, Wheelchair-manual, Walker, rolling  Has the patient had two or more falls in the past year or any fall with injury in the past year?: Yes  Receives Help From: Family  ADL Assistance: Independent  Homemaking Assistance: Needs assistance  Homemaking Responsibilities: No  Ambulation Assistance: Independent  Transfer Assistance: Independent  Active : No  Patient's  Info: Daughter drives for patient and his wife.        Objective   Heart Rate: 65  BP: (!) 165/80  BP Location: Right upper arm  BP Method: Manual  MAP (Calculated): 108.33  Resp: 18  SpO2: 98 %  O2 Device: None (Room air)          Observation/Palpation  Observation: Pt lying in bed, NAD, pleasant and cooperative, able to follow basic commands     Bed Mobility Training  Bed Mobility Training: No  Balance  Sitting: Intact  Standing: Intact  Gait  Overall Level of Assistance: Stand-by assistance  Distance (ft): 300 Feet     AROM: Within functional limits  PROM: Within functional limits  Strength: Within functional limits  Coordination: Within functional limits  ADL  LE Dressing: Stand by assistance  Toileting Skilled Clinical Factors: incontinent of urine        Bed mobility  Rolling to Right: Modified independent  Supine to Sit: Modified independent  Sit to Supine: Modified independent  Scooting: Modified independent  Transfers  Stand Pivot Transfers: Stand by assistance  Sit to stand: Stand by assistance  Vision  Vision: Impaired  Vision Exceptions: Wears glasses at all times  Hearing  Hearing: Exceptions to [facility]  Hearing Exceptions: Hard of hearing/hearing concerns; Left hearing aid  Cognition  Cognition Comment: decreased STM, decreased safety awareness  Orientation  Overall Orientation Status: Impaired  Orientation Level: Oriented to person;Oriented to place; Disoriented to time                     LUE AROM (degrees)  LUE AROM : WFL  RUE AROM (degrees)  RUE AROM : WFL    Goals  Short Term Goals  Time Frame for Short term goals: 1 week  Short Term Goal 1: Pt to complete shower with MOD I. Short Term Goal 2: Pt to complete dressing with MOD I. Short Term Goal 3: Pt to complete hygiene/grooming standing at sink with no LOB. Short Term Goal 4: Pt to complete toileting with MOD I. Short Term Goal 5: SLUMS assessment to be completed to assess cognition. Therapy Time   Individual Concurrent Group Co-treatment   Time In 3965         Time Out 6518         Minutes 27             This note serves as a DC summary in the event of pt discharge.       Deninse Yates, OTR/L

## 2022-08-01 NOTE — PROGRESS NOTES
Restrictions  Restrictions/Precautions  Restrictions/Precautions: Fall Risk, General Precautions  Required Braces or Orthoses?: No     Subjective   Pain: No c/o pain. General  Chart Reviewed: Yes  Patient assessed for rehabilitation services?: Yes  Family / Caregiver Present: No  Referring Practitioner: MORRIS Smith  Follows Commands: Within Functional Limits         Social/Functional History  Social/Functional History  Lives With: Spouse  Type of Home: House  Home Layout: Two level, Able to Live on Main level with bedroom/bathroom, Performs ADL's on one level  Home Access: Stairs to enter with rails  Entrance Stairs - Number of Steps: 6  Bathroom Shower/Tub: Tub/Shower unit  Bathroom Toilet: Standard  Bathroom Equipment: Shower chair, Grab bars around toilet, 3-in-1 commode  Bathroom Accessibility: Walker accessible  Home Equipment: [employer] [address] bed, Lift chair, Oxygen, Wheelchair-manual, Walker, rolling  Has the patient had two or more falls in the past year or any fall with injury in the past year?: Yes  Receives Help From: Family  ADL Assistance: Independent  Homemaking Assistance: Needs assistance  Homemaking Responsibilities: No  Ambulation Assistance: Independent  Transfer Assistance: Independent  Active : No  Patient's  Info: Daughter drives for patient and his wife. Vision/Hearing  Vision  Vision: Impaired  Hearing  Hearing: Exceptions to [facility]  Hearing Exceptions: Hard of hearing/hearing concerns; Left hearing aid    Cognition         Objective   Heart Rate: 65  BP: (!) 165/80  BP Location: Right upper arm  BP Method: Manual  MAP (Calculated): 108.33  Resp: 18  SpO2: 98 %  O2 Device: None (Room air)     Observation/Palpation  Observation: Pt lying in bed, NAD, pleasant and cooperative, able to follow basic commands  Gross Assessment  AROM: Within functional limits  PROM: Within functional limits  Strength:  Within functional limits  Coordination: Within functional limits Bed Mobility Training  Bed Mobility Training: No  Balance  Sitting: Intact  Standing: Intact  Gait  Overall Level of Assistance: Stand-by assistance  Distance (ft): 300 Feet  Bed mobility  Rolling to Right: Modified independent  Supine to Sit: Modified independent  Sit to Supine: Modified independent  Scooting: Modified independent  Transfers  Sit to Stand: Contact guard assistance  Stand to sit: Contact guard assistance  Bed to Chair: Contact guard assistance  Ambulation  Surface: level tile  Device: Rolling Walker  Assistance: Contact guard assistance  Gait Deviations: Slow Ariela;Decreased step length;Decreased step height  Distance: 300'     Balance  Posture: Fair (Forward flexed with ambulation.)  Sitting - Static: Good  Sitting - Dynamic: Good  Standing - Static: Good  Standing - Dynamic: Good;-           OutComes Score                                                  AM-PAC Score             Tinneti Score       Goals  Long Term Goals  Time Frame for Long term goals : 14 days  Long term goal 1: Patient will perform all bed mobiltiy with independence. Long term goal 2: Patient will perform sit to stand and transfers with RW, correct safety measures and mod I.  Long term goal 3: Patient will ambulate 400'x2 with RW and no more than SBA. Long term goal 4: Patient will perform B LE ther ex x 15 reps.        Education  Patient Education  Education Given To: Patient  Education Provided: Role of Therapy;Plan of Care;Transfer Training;Precautions  Education Method: Demonstration;Verbal  Education Outcome: Continued education needed      Therapy Time   Individual Concurrent Group Co-treatment   Time In 0857         Time Out 0921         Minutes [address] [place] [address]

## 2022-08-01 NOTE — FLOWSHEET NOTE
08/01/22 0800   Assessment   Charting Type Shift assessment   Psychosocial   Psychosocial (WDL) WDL   Neurological   Neuro (WDL) X   Level of Consciousness New confusion or agitation (1)  (baseline confusion)   [place] Coma Scale   Eye Opening 4   Best Verbal Response 4   Best Motor Response 6   Antonio Coma Scale Score 14   HEENT (Head, Ears, Eyes, Nose, & Throat)   HEENT (WDL) X   Right Eye Glasses   Left Eye Glasses   Right Ear Hearing aid; Impaired hearing   Left Ear Hearing aid; Impaired hearing   Teeth Missing teeth   Respiratory   Respiratory (WDL) WDL   Cardiac   Cardiac (WDL) X   Cardiac Rhythm Sinus maricel   Cardiac Monitor   Cardiac/Telemetry Monitor On Portable telemetry pack applied   Alarm Audible Yes   Alarms Set Yes   Gastrointestinal   Abdominal (WDL) WDL   Genitourinary   Genitourinary (WDL) WDL   Peripheral Vascular   Peripheral Vascular (WDL) WDL   Skin Integumentary    Skin Integumentary (WDL) X   Skin Color Ecchymosis (comment)   Location rt knee   Musculoskeletal   Musculoskeletal (WDL) X   RL Extremity Weakness; Unsteady   LL Extremity Weakness; Unsteady   Care Plan - Chronic Conditions and Co-Morbidity   Care Plan - Patient's Chronic Conditions and Co-Morbidity Symptoms are Monitored and Maintained or Improved Monitor and assess patient's chronic conditions and comorbid symptoms for stability, deterioration, or improvement;Collaborate with multidisciplinary team to address chronic and comorbid conditions and prevent exacerbation or deterioration;Update acute care plan with appropriate goals if chronic or comorbid symptoms are exacerbated and prevent overall improvement and discharge   Care Plan - Discharge Planning Goals   Discharge to home or other facility with appropriate resources Identify barriers to discharge with patient and caregiver

## 2022-08-01 NOTE — PLAN OF CARE
Problem: Discharge Planning  Goal: Discharge to home or other facility with appropriate resources  Outcome: Progressing  Flowsheets (Taken 7/31/2022 2143 by Shalonda Cuellar RN)  Discharge to home or other facility with appropriate resources:   Arrange for needed discharge resources and transportation as appropriate   Identify discharge learning needs (meds, wound care, etc)     Problem: Safety - Adult  Goal: Free from fall injury  Outcome: Progressing     Problem: Chronic Conditions and Co-morbidities  Goal: Patient's chronic conditions and co-morbidity symptoms are monitored and maintained or improved  Outcome: Progressing     Problem: Skin/Tissue Integrity  Goal: Absence of new skin breakdown  Description: 1. Monitor for areas of redness and/or skin breakdown  2. Assess vascular access sites hourly  3. Every 4-6 hours minimum:  Change oxygen saturation probe site  4. Every 4-6 hours:  If on nasal continuous positive airway pressure, respiratory therapy assess nares and determine need for appliance change or resting period.   Outcome: Progressing

## 2022-08-01 NOTE — CARE COORDINATION
CM and PA spoke to wife. Wife updated that pt is medically cleared. Wife is going to talk with family to see if they are wanting NHP or home with [facility]. All questions answered. Family is going to let CM know their decision today.

## 2022-08-02 LAB
GLUCOSE BLD-MCNC: 164 MG/DL (ref 74–106)
PERFORMED ON: ABNORMAL

## 2022-08-02 PROCEDURE — 6360000002 HC RX W HCPCS: Performed by: PHYSICIAN ASSISTANT

## 2022-08-02 PROCEDURE — 97535 SELF CARE MNGMENT TRAINING: CPT

## 2022-08-02 PROCEDURE — 6370000000 HC RX 637 (ALT 250 FOR IP): Performed by: PHYSICIAN ASSISTANT

## 2022-08-02 PROCEDURE — 1200000000 HC SEMI PRIVATE

## 2022-08-02 PROCEDURE — 97110 THERAPEUTIC EXERCISES: CPT

## 2022-08-02 PROCEDURE — 97530 THERAPEUTIC ACTIVITIES: CPT

## 2022-08-02 PROCEDURE — 99231 SBSQ HOSP IP/OBS SF/LOW 25: CPT | Performed by: INTERNAL MEDICINE

## 2022-08-02 RX ORDER — HYDRALAZINE HYDROCHLORIDE 25 MG/1
25 TABLET, FILM COATED ORAL EVERY 8 HOURS SCHEDULED
Status: DISCONTINUED | OUTPATIENT
Start: 2022-08-02 | End: 2022-08-03 | Stop reason: HOSPADM

## 2022-08-02 RX ADMIN — CILOSTAZOL 100 MG: 100 TABLET ORAL at 20:22

## 2022-08-02 RX ADMIN — TAMSULOSIN HYDROCHLORIDE 0.4 MG: 0.4 CAPSULE ORAL at 08:14

## 2022-08-02 RX ADMIN — ACETAMINOPHEN 650 MG: 325 TABLET, FILM COATED ORAL at 08:17

## 2022-08-02 RX ADMIN — METOPROLOL TARTRATE 12.5 MG: 25 TABLET, FILM COATED ORAL at 08:13

## 2022-08-02 RX ADMIN — ASPIRIN 81 MG 81 MG: 81 TABLET ORAL at 08:13

## 2022-08-02 RX ADMIN — CILOSTAZOL 100 MG: 100 TABLET ORAL at 08:13

## 2022-08-02 RX ADMIN — METOPROLOL TARTRATE 12.5 MG: 25 TABLET, FILM COATED ORAL at 20:22

## 2022-08-02 RX ADMIN — ROSUVASTATIN 20 MG: 20 TABLET, FILM COATED ORAL at 08:13

## 2022-08-02 RX ADMIN — ALOGLIPTIN 6.25 MG: 12.5 TABLET, FILM COATED ORAL at 08:13

## 2022-08-02 RX ADMIN — LOSARTAN POTASSIUM 50 MG: 50 TABLET, FILM COATED ORAL at 08:14

## 2022-08-02 RX ADMIN — HYDRALAZINE HYDROCHLORIDE 25 MG: 25 TABLET, FILM COATED ORAL at 20:27

## 2022-08-02 RX ADMIN — ENOXAPARIN SODIUM 40 MG: 100 INJECTION SUBCUTANEOUS at 08:12

## 2022-08-02 RX ADMIN — HYDRALAZINE HYDROCHLORIDE 25 MG: 25 TABLET, FILM COATED ORAL at 14:29

## 2022-08-02 RX ADMIN — PANTOPRAZOLE SODIUM 40 MG: 40 TABLET, DELAYED RELEASE ORAL at 06:16

## 2022-08-02 RX ADMIN — DONEPEZIL HYDROCHLORIDE 5 MG: 5 TABLET, FILM COATED ORAL at 20:22

## 2022-08-02 RX ADMIN — QUETIAPINE FUMARATE 25 MG: 25 TABLET ORAL at 20:22

## 2022-08-02 ASSESSMENT — PAIN DESCRIPTION - LOCATION: LOCATION: BACK

## 2022-08-02 ASSESSMENT — PAIN SCALES - GENERAL: PAINLEVEL_OUTOF10: 5

## 2022-08-02 NOTE — CARE COORDINATION
[facility] and [employer] both have denied. Referral forwarded to [facility] (next closest [facility]). Pt is agreeable to NHP at this time.

## 2022-08-02 NOTE — PLAN OF CARE
Problem: Discharge Planning  Goal: Discharge to home or other facility with appropriate resources  8/2/2022 1007 by Ely Mcfarlane RN  Outcome: Not Progressing  8/2/2022 0233 by Sanjana Juares RN  Outcome: Progressing     Problem: Safety - Adult  Goal: Free from fall injury  8/2/2022 1007 by Ely Mcfarlane RN  Outcome: Not Progressing  8/2/2022 0233 by Sanjana Juares RN  Outcome: Progressing     Problem: Chronic Conditions and Co-morbidities  Goal: Patient's chronic conditions and co-morbidity symptoms are monitored and maintained or improved  8/2/2022 1007 by Ely Mcfarlane RN  Outcome: Not Progressing  8/2/2022 0233 by Sanjana Juares RN  Outcome: Progressing     Problem: Skin/Tissue Integrity  Goal: Absence of new skin breakdown  8/2/2022 1007 by Ely Mcfarlane RN  Outcome: Not Progressing  8/2/2022 0233 by Sanjana Juares RN  Outcome: Progressing     Problem: ABCDS Injury Assessment  Goal: Absence of physical injury  8/2/2022 1007 by Ely Mcfarlane RN  Outcome: Not Progressing  8/2/2022 0233 by Sanjana Juares RN  Outcome: Progressing

## 2022-08-02 NOTE — PROGRESS NOTES
Physical Therapy  Facility/Department: [facility]  Daily Treatment Note  NAME: Rober Krishna  :   MRN: [id number]    Date of Service: 2022    Discharge Recommendations:      Patient Diagnosis(es): The primary encounter diagnosis was Hypercalcemia. Diagnoses of Hypokalemia and Chronic dementia with behavioral disturbance ([address] were also pertinent to this visit. Assessment   Assessment: Patient presents up in recliner requesting to lie down a while. Engaged patient in B LE therex in sitting. Patient stood with SBA and ambulated 75 feet with RW and SBA and then transferred to bed. Mod I with bed mobility tasks. Patient very pleasant and appropriate with conversations. Activity Tolerance: Patient tolerated treatment well     Plan    Plan  Plan: 3-5 times per week  Current Treatment Recommendations: Strengthening;Balance training;Functional mobility training;Transfer training;Gait training; Endurance training; Therapeutic activities; Home exercise program;Safety education & training;Patient/Caregiver education & training     Restrictions  Restrictions/Precautions  Restrictions/Precautions: Fall Risk, General Precautions  Required Braces or Orthoses?: No     Subjective    Subjective  Subjective: Patient presents up in recliner requesting to get back in bed. States he's been up quite a while and would like to rest.     Objective      Bed Mobility Training  Bed Mobility Training: Yes  Overall Level of Assistance: Modified independent  Rolling: Modified independent  Sit to Supine: Modified independent  Scooting: Modified independent  Balance  Sitting: Intact  Transfer Training  Transfer Training: Yes  Overall Level of Assistance: Stand-by assistance  Interventions: Verbal cues; Safety awareness training  Sit to Stand: Stand-by assistance  Stand to Sit: Stand-by assistance  Bed to Chair: Stand-by assistance  Gait Training  Gait Training: Yes  Gait  Overall Level of Assistance: Stand-by assistance  Distance (ft): 75 Feet  Assistive Device: Walker, rolling     PT Exercises  Exercise Treatment: B LE therex in sitting     Safety Devices  Type of Devices: Left in bed;Call light within reach     Goals  Long Term Goals  Time Frame for Long term goals : 14 days  Long term goal 1: Patient will perform all bed mobiltiy with independence. Long term goal 2: Patient will perform sit to stand and transfers with RW, correct safety measures and mod I.  Long term goal 3: Patient will ambulate 400'x2 with RW and no more than SBA. Long term goal 4: Patient will perform B LE ther ex x 15 reps. Therapy Time   Individual Concurrent Group Co-treatment   Time In 1402         Time Out 8383         Minutes 23             This note serves as D/C summary if patient is discharged prior to next visit.    Timbo Lei, PTA

## 2022-08-02 NOTE — FLOWSHEET NOTE
Pt has been up in chair for 2 hrs, then transferred back to bed CGA. Pt alert and cooperative. Family has been to visit today. Spoke with CM earlier, states pt probably not going to nursing home today. Pt instructed on this as well as family. Verb understanding.

## 2022-08-02 NOTE — PLAN OF CARE
Problem: Discharge Planning  Goal: Discharge to home or other facility with appropriate resources  Outcome: Progressing     Problem: Safety - Adult  Goal: Free from fall injury  Outcome: Progressing     Problem: Chronic Conditions and Co-morbidities  Goal: Patient's chronic conditions and co-morbidity symptoms are monitored and maintained or improved  Outcome: Progressing     Problem: Skin/Tissue Integrity  Goal: Absence of new skin breakdown  Description: 1. Monitor for areas of redness and/or skin breakdown  2. Assess vascular access sites hourly  3. Every 4-6 hours minimum:  Change oxygen saturation probe site  4. Every 4-6 hours:  If on nasal continuous positive airway pressure, respiratory therapy assess nares and determine need for appliance change or resting period.   Outcome: Progressing     Problem: ABCDS Injury Assessment  Goal: Absence of physical injury  Outcome: Progressing

## 2022-08-02 NOTE — FLOWSHEET NOTE
08/01/22 2438   Assessment   Charting Type Shift assessment   Psychosocial   Psychosocial (WDL) X   Patient Behaviors Anxious; Verbal   Neurological   Neuro (WDL) X   Level of Consciousness Alert (0)   Orientation Level Oriented X4   Speech Clear   R Hand  Strong   L Hand  Strong   R Foot Dorsiflexion Strong   L Foot Dorsiflexion Strong   [place] Coma Scale   Eye Opening 4   Best Verbal Response 5   Best Motor Response 6   Antonio Coma Scale Score 15   HEENT (Head, Ears, Eyes, Nose, & Throat)   HEENT (WDL) X   Right Eye Glasses   Left Eye Glasses   Right Ear Hearing aid; Impaired hearing   Left Ear Hearing aid; Impaired hearing   Head and Face Asymmetrical  (HX Stroke)   Teeth Missing teeth   Respiratory   Respiratory (WDL) WDL   Cardiac   Cardiac (WDL) WDL   Cardiac Regularity Regular   Heart Sounds S1, S2   Cardiac Monitor   Telemetry Box Number    Gastrointestinal   Abdominal (WDL) WDL   Last BM (including prior to admit) 07/29/22   Genitourinary   Genitourinary (WDL) WDL   Peripheral Vascular   Peripheral Vascular (WDL) WDL   Skin Integumentary    Skin Integumentary (WDL) X   Skin Color Ecchymosis (comment)   Location Right knee   Musculoskeletal   RL Extremity Weakness   LL Extremity Weakness

## 2022-08-02 NOTE — CARE COORDINATION
CM spoke to daughter, Pooja Pittman, and educated pt was accepted at [address]. Daughter states they will take pt home with her and care for him at home. Verbalized understanding he will need 24/7 care at home. [facility]. PA notified of family decision. Plan to DC in AM to home with family.

## 2022-08-02 NOTE — PROGRESS NOTES
Progress Note      Subjective:   Chief complaint:   Weakness, recurrent falls  Dementia with behavioral disturbances    Interval History:   Pt seen and examined this morning. He is now agreeable to go to NH. Review of systems:   Constitutional:  Denies fever or chills. Positive for weight loss, fatigue, generalized weakness and declining functional status. Eyes:  Denies change in visual acuity or discharge. HENT:  Denies nasal congestion or sore throat. Respiratory:  Denies cough or shortness of breath. Cardiovascular:  Denies chest pain, palpitation or swelling in LEs. GI:  Denies abdominal pain, nausea, vomiting, bloody stools or diarrhea. :  Denies dysuria or frequency. Musculoskeletal: Positive for chronic back pain and arthralgias. Integument:  Denies rash or itching. Neurologic:  Denies headache, focal weakness or sensory changes. Positive for memory problem. Endocrine:  Denies polyuria or polydipsia. Lymphatic:  Denies swollen glands or night sweats. Psychiatric:  Denies depression or anxiety. Past medical history, surgical history, family history and social history reviewed and unchanged compared to H&P earlier this admission. Medications:   Scheduled Meds:   losartan  50 mg Oral Daily    vitamin D  50,000 Units Oral Weekly    donepezil  5 mg Oral Nightly    aspirin  81 mg Oral Daily    cilostazol  100 mg Oral BID    metoprolol tartrate  12.5 mg Oral BID    pantoprazole  40 mg Oral QAM AC    QUEtiapine  25 mg Oral Nightly    rosuvastatin  20 mg Oral Daily    alogliptin  6.25 mg Oral Daily    tamsulosin  0.4 mg Oral Daily    enoxaparin  40 mg SubCUTAneous Daily    nicotine  1 patch TransDERmal Daily     Continuous Infusions:   dextrose         Objective:     Vital Signs  Temp: 97.5 °F (36.4 °C)  Heart Rate: 78  Resp: 22  BP: (!) 159/63  SpO2: 97 %  O2 Device: None (Room air)       Vital signs reviewed in electronic charts.     Physical exam  Constitutional:  Well developed, thin elderly gentleman in no acute distress  Eyes:  no scleral icterus, conjunctiva normal  HENT:  Atraumatic, external ears normal, nose normal, oropharynx moist, no pharyngeal exudates. Neck- supple, no JVD, no lymphadenopathy  Respiratory:  No respiratory distress on RA, no wheezing, rales or rhonchi detected  Cardiovascular:  Normal rate, normal rhythm, no murmurs, no gallops, no rubs, trace ble edema  GI:  Soft, thin, nondistended, normal bowel sounds, nontender, no voluntary guarding  Musculoskeletal:  No cyanosis or obvious acute deformity. Moving all extremities with mild generalized weakness throughout  Integument:  Warm and dry. Neurologic:  Alert & oriented x to self, place, situation. Answers questions appropriately. Memory problem noted.  strength intact and equal bilaterally. no apparent focal deficits noted  Psychiatric:  Speech and behavior appropriate     Results:     Lab Results   Component Value Date    WBC 7.2 08/01/2022    HGB 13.9 08/01/2022    HCT 41.7 08/01/2022    MCV 87.6 08/01/2022     08/01/2022       Lab Results   Component Value Date/Time     08/01/2022 05:45 AM    K 3.5 08/01/2022 05:45 AM    K 3.0 07/30/2022 04:42 AM     08/01/2022 05:45 AM    CO2 21 08/01/2022 05:45 AM    BUN 10 08/01/2022 05:45 AM    CREATININE 1.3 08/01/2022 05:45 AM    GLUCOSE 149 08/01/2022 05:45 AM    CALCIUM 10.2 08/01/2022 05:45 AM        Assessment and Plan: Active Hospital Problems     Diagnosis Date Noted    Dementia with behavioral disturbance ([facility] [address] [F03.91]  -As per HPI, wife reports patient with increased agitation and combativeness at home. She is no longer able to care for him and is requesting nursing home placement. Daughter plans to apply for guardianship.  Pt also agreeable to NH placement.  -Continue Aricept, Seroquel    08/01/2022    AMS (altered mental status) [R41.82]  -suspect due to progression of known dementia  -No evidence of acute infection  -CT head 7/29 with no acute intracranial pathology  -Admission UDS negative  -TSH, B12 and folate within normal limits  -Ammonia 25    07/30/2022    Hypercalcemia [E83.52]  -Ca 11.7 on 7/29; PTH 36.3   -pt hydrated with resolution.  -resolved  -encourage good po intake    07/29/2022    Hypomagnesemia [E83.42]  -replaced  -monitor and replete PRN    06/03/2022    Spinal stenosis [M48.00]  -chronic  -PT OT following; patient able to ambulate with assistance of walker    06/02/2022    Weight loss [R63.4]  -Patient and patient's family report ongoing weight loss over the last several months. They also report decreased oral intake.   -CT chest in January 2022 with no findings of occult malignancy  -CT abdomen pelvis 6/22 without acute finding or acute malignancy  -PSA within normal limits  -Could consider outpatient EGD and colonoscopy to evaluate for causes of weight loss/occult malignancy; will defer to PCP   -dietitian following    06/02/2022    History of stroke [Z86.73]  -Continue aspirin, Crestor    04/05/2022    Hypokalemia [E87.6]  -Replaced  -Monitor and replete as needed    04/05/2022    Peripheral vascular disease ([facility] [address] [I73.9]  -Continue aspirin, Pletal and Crestor    04/05/2022    Acute kidney injury superimposed on CKD ([address] [N17.9, N18.9]  -Serum creatinine 1.7 on admission; serum creatinine down trended to 1.3 after IV fluids and improved p.o. intake  -Avoid nephrotoxins and NSAIDs  -Encourage good p.o. intake  -Suspect MARIE is prerenal secondary to dehydration from poor p.o. intake    02/01/2022    Generalized weakness [R53.1]  -Suspect secondary to dehydration, electrolyte abnormalities, dementia and advanced age  -PT OT following  -B12, folate, TSH are within normal limits  -Fall precautions    02/01/2022    Benign essential HTN [I10]  -Blood pressure noted to be elevated with current regimen.  -continue metoprolol, losartan (at increased dose)  -add hydralazine 25mg TID 8/2 03/28/2011    Diabetes mellitus, type II ([address] [E11.9]  -A1c 7.9 on 4/4/2022  -Continue Nesina  -diabetic diet    03/28/2011    Other specified hypothyroidism [E03.8]  -TSH 0.58  -Continue Synthroid     Vitamin d deficiency  -add vitamin d supplement 8/1 03/28/2011        Patient was seen and examined by Dr. Cuco Santiago and plan of care reviewed. Treatment plan was formulated collaboratively.       Electronically signed by MORRIS Lu on 8/2/2022 at 10:16 AM

## 2022-08-02 NOTE — PROGRESS NOTES
Occupational Therapy  Facility/Department: [facility]  Daily Treatment Note  NAME: Rodney Douglass  :   MRN: [id number]    Date of Service: 2022    Discharge Recommendations:  24 hour supervision or assist, Home with Home health OT         Patient Diagnosis(es): The primary encounter diagnosis was Hypercalcemia. Diagnoses of Hypokalemia and Chronic dementia with behavioral disturbance ([facility] [address] were also pertinent to this visit. Assessment    Assessment: Pt agreeable to OT services. Pt completed bed mobility with MOD I. Pt transferred to standing with SBA and ambulated to bathroom SBA. Pt transferred to toilet CGA with min verbal cuing for safety awareness. Pt toileted with CGA. Pt come to stand and OT demo safe shower transfer technique. Pt required MOD verbal cuing for safety and CGA to transfer to shower. Pt completed shower seated on shower chair. Pt required CGA<>SBA for LB bathing standing to complete tanisha care with CGA otherwise SBA. Pt completed UB bathing with LEACH. Pt completed UB dressing with LEACH and LB dressing with CGA<>SBA seated on shower chair. Pt ambulated to recliner ~15 feet and LEACH in chair with call light in reach. Activity Tolerance: Patient tolerated treatment well      Plan         Restrictions       Subjective   Subjective  Subjective: I want to shower           Objective    Vitals     Bed Mobility Training  Bed Mobility Training: Yes  Overall Level of Assistance: Modified independent  Rolling: Modified independent  Sit to Supine: Modified independent  Scooting: Modified independent  Balance  Sitting: Intact  Transfer Training  Transfer Training: Yes  Overall Level of Assistance: Stand-by assistance  Interventions: Verbal cues; Safety awareness training  Sit to Stand: Stand-by assistance  Stand to Sit: Stand-by assistance  Bed to Chair: Stand-by assistance  Gait Training  Gait Training: Yes  Gait  Overall Level of Assistance: Stand-by assistance  Distance (ft): 75 Feet  Assistive Device: Walker, rolling     ADL  Grooming: Setup  UE Bathing: Setup  LE Bathing: Contact guard assistance;Stand by assistance  UE Dressing: Stand by assistance  LE Dressing: Stand by assistance;Contact guard assistance  Toileting: Contact guard assistance        Safety Devices  Type of Devices: Left in bed;Call light within reach          Goals  Short Term Goals  Time Frame for Short term goals: 1 week  Short Term Goal 1: Pt to complete shower with MOD I. Short Term Goal 2: Pt to complete dressing with MOD I. Short Term Goal 3: Pt to complete hygiene/grooming standing at sink with no LOB. Short Term Goal 4: Pt to complete toileting with MOD I. Short Term Goal 5: SLUMS assessment to be completed to assess cognition. Therapy Time   Individual Concurrent Group Co-treatment   Time In 4981         Time Out 1336         Minutes 38             This note serves as a DC summary in the event of pt discharge.       Dennise Yates, OTR/L

## 2022-08-03 VITALS
WEIGHT: 157 LBS | RESPIRATION RATE: 18 BRPM | HEART RATE: 73 BPM | BODY MASS INDEX: 23.79 KG/M2 | HEIGHT: 68 IN | SYSTOLIC BLOOD PRESSURE: 119 MMHG | TEMPERATURE: 98.4 F | DIASTOLIC BLOOD PRESSURE: 61 MMHG | OXYGEN SATURATION: 94 %

## 2022-08-03 LAB
A/G RATIO: 2.3 (ref 0.8–2)
ALBUMIN SERPL-MCNC: 3.7 G/DL (ref 3.4–4.8)
ALP BLD-CCNC: 64 U/L (ref 25–100)
ALT SERPL-CCNC: 16 U/L (ref 4–36)
ANION GAP SERPL CALCULATED.3IONS-SCNC: 9 MMOL/L (ref 3–16)
AST SERPL-CCNC: 13 U/L (ref 8–33)
BILIRUB SERPL-MCNC: 0.7 MG/DL (ref 0.3–1.2)
BUN BLDV-MCNC: 16 MG/DL (ref 6–20)
CALCIUM SERPL-MCNC: 10.7 MG/DL (ref 8.5–10.5)
CHLORIDE BLD-SCNC: 109 MMOL/L (ref 98–107)
CO2: 21 MMOL/L (ref 20–30)
CREAT SERPL-MCNC: 1.5 MG/DL (ref 0.4–1.2)
GFR AFRICAN AMERICAN: 54
GFR NON-AFRICAN AMERICAN: 45
GLOBULIN: 1.6 G/DL
GLUCOSE BLD-MCNC: 128 MG/DL (ref 74–106)
GLUCOSE BLD-MCNC: 154 MG/DL (ref 74–106)
PERFORMED ON: ABNORMAL
POTASSIUM REFLEX MAGNESIUM: 4 MMOL/L (ref 3.4–5.1)
SODIUM BLD-SCNC: 139 MMOL/L (ref 136–145)
TOTAL PROTEIN: 5.3 G/DL (ref 6.4–8.3)

## 2022-08-03 PROCEDURE — 6360000002 HC RX W HCPCS: Performed by: PHYSICIAN ASSISTANT

## 2022-08-03 PROCEDURE — 80053 COMPREHEN METABOLIC PANEL: CPT

## 2022-08-03 PROCEDURE — 6370000000 HC RX 637 (ALT 250 FOR IP): Performed by: PHYSICIAN ASSISTANT

## 2022-08-03 PROCEDURE — 99238 HOSP IP/OBS DSCHRG MGMT 30/<: CPT | Performed by: INTERNAL MEDICINE

## 2022-08-03 PROCEDURE — 36415 COLL VENOUS BLD VENIPUNCTURE: CPT

## 2022-08-03 RX ORDER — DONEPEZIL HYDROCHLORIDE 5 MG/1
5 TABLET, FILM COATED ORAL NIGHTLY
Qty: 30 TABLET | Refills: 3 | Status: SHIPPED | OUTPATIENT
Start: 2022-08-03

## 2022-08-03 RX ORDER — HYDRALAZINE HYDROCHLORIDE 25 MG/1
25 TABLET, FILM COATED ORAL EVERY 8 HOURS SCHEDULED
Qty: 90 TABLET | Refills: 3 | Status: SHIPPED | OUTPATIENT
Start: 2022-08-03 | End: 2022-11-02

## 2022-08-03 RX ORDER — ERGOCALCIFEROL 1.25 MG/1
50000 CAPSULE ORAL WEEKLY
Qty: 8 CAPSULE | Refills: 0 | Status: SHIPPED | OUTPATIENT
Start: 2022-08-03

## 2022-08-03 RX ORDER — LOSARTAN POTASSIUM 50 MG/1
50 TABLET ORAL DAILY
Qty: 30 TABLET | Refills: 3 | Status: SHIPPED | OUTPATIENT
Start: 2022-08-04 | End: 2022-10-13

## 2022-08-03 RX ADMIN — PANTOPRAZOLE SODIUM 40 MG: 40 TABLET, DELAYED RELEASE ORAL at 05:36

## 2022-08-03 RX ADMIN — ENOXAPARIN SODIUM 40 MG: 100 INJECTION SUBCUTANEOUS at 08:02

## 2022-08-03 RX ADMIN — TAMSULOSIN HYDROCHLORIDE 0.4 MG: 0.4 CAPSULE ORAL at 08:03

## 2022-08-03 RX ADMIN — METOPROLOL TARTRATE 12.5 MG: 25 TABLET, FILM COATED ORAL at 08:02

## 2022-08-03 RX ADMIN — ACETAMINOPHEN 650 MG: 325 TABLET, FILM COATED ORAL at 05:34

## 2022-08-03 RX ADMIN — CILOSTAZOL 100 MG: 100 TABLET ORAL at 08:01

## 2022-08-03 RX ADMIN — HYDRALAZINE HYDROCHLORIDE 25 MG: 25 TABLET, FILM COATED ORAL at 05:35

## 2022-08-03 RX ADMIN — ASPIRIN 81 MG 81 MG: 81 TABLET ORAL at 08:02

## 2022-08-03 RX ADMIN — LOSARTAN POTASSIUM 50 MG: 50 TABLET, FILM COATED ORAL at 08:02

## 2022-08-03 RX ADMIN — ALOGLIPTIN 6.25 MG: 12.5 TABLET, FILM COATED ORAL at 08:02

## 2022-08-03 RX ADMIN — ROSUVASTATIN 20 MG: 20 TABLET, FILM COATED ORAL at 08:02

## 2022-08-03 NOTE — PLAN OF CARE
Problem: Discharge Planning  Goal: Discharge to home or other facility with appropriate resources  8/3/2022 1220 by rFannie Gómez RN  Outcome: Adequate for Discharge  8/3/2022 1025 by Frannie Gómez RN  Outcome: Progressing  Flowsheets (Taken 8/3/2022 0800)  Discharge to home or other facility with appropriate resources: Identify barriers to discharge with patient and caregiver  8/2/2022 2323 by Taylor Pires RN  Outcome: Progressing     Problem: Safety - Adult  Goal: Free from fall injury  8/3/2022 1220 by Frannie Gómez RN  Outcome: Adequate for Discharge  8/3/2022 1025 by Frannie Gómez RN  Outcome: Progressing  8/2/2022 2323 by Taylor Pires RN  Outcome: Progressing     Problem: Chronic Conditions and Co-morbidities  Goal: Patient's chronic conditions and co-morbidity symptoms are monitored and maintained or improved  8/3/2022 1220 by Frannie Gómez RN  Outcome: Adequate for Discharge  8/3/2022 1025 by Frannie Gómez RN  Outcome: Progressing  Flowsheets (Taken 8/3/2022 0800)  Care Plan - Patient's Chronic Conditions and Co-Morbidity Symptoms are Monitored and Maintained or Improved:   Monitor and assess patient's chronic conditions and comorbid symptoms for stability, deterioration, or improvement   Collaborate with multidisciplinary team to address chronic and comorbid conditions and prevent exacerbation or deterioration   Update acute care plan with appropriate goals if chronic or comorbid symptoms are exacerbated and prevent overall improvement and discharge  8/2/2022 2323 by Taylor Pires RN  Outcome: Progressing     Problem: Skin/Tissue Integrity  Goal: Absence of new skin breakdown  Description: 1. Monitor for areas of redness and/or skin breakdown  2. Assess vascular access sites hourly  3. Every 4-6 hours minimum:  Change oxygen saturation probe site  4.   Every 4-6 hours:  If on nasal continuous positive airway pressure, respiratory therapy assess nares and determine need for appliance change or resting period.   8/3/2022 1220 by Marilee aGrcia RN  Outcome: Adequate for Discharge  8/3/2022 1025 by Marilee Garcia RN  Outcome: Progressing  8/2/2022 2323 by Alexys Pitts RN  Outcome: Progressing     Problem: ABCDS Injury Assessment  Goal: Absence of physical injury  8/3/2022 1220 by Marilee Garcia RN  Outcome: Adequate for Discharge  8/3/2022 1025 by Marilee Garcia RN  Outcome: Progressing  8/2/2022 2323 by Alexys Pitts RN  Outcome: Progressing

## 2022-08-03 NOTE — CARE COORDINATION
The Plan for Transition of Care is related to the following treatment goals: home with 24/7 care and [facility] referral    The Patient and/or patient representative, Daughter - Ese Pino,  was provided with a choice of provider and agrees with the discharge plan. [x] Yes [] No    Freedom of choice list was provided with basic dialogue that supports the patient's individualized plan of care/goals, treatment preferences and shares the quality data associated with the providers. [x] Yes [] No    Family is taking pt home today. HH order sent to [facility] (previous [address]). They will review referral and  if pt qualifies. No DME needs noted at this time. Addendum:  [facility] is out of network with insurance. HH referral forwarded to [facility].

## 2022-08-03 NOTE — PLAN OF CARE
Problem: Discharge Planning  Goal: Discharge to home or other facility with appropriate resources  8/2/2022 2323 by Darren Valdes RN  Outcome: Progressing  8/2/2022 1007 by Everett Saba RN  Outcome: Not Progressing     Problem: Safety - Adult  Goal: Free from fall injury  8/2/2022 2323 by Darren Valdes RN  Outcome: Progressing  8/2/2022 1007 by Everett Saba RN  Outcome: Not Progressing     Problem: Chronic Conditions and Co-morbidities  Goal: Patient's chronic conditions and co-morbidity symptoms are monitored and maintained or improved  8/2/2022 2323 by Darren Valdes RN  Outcome: Progressing  8/2/2022 1007 by Everett Saba RN  Outcome: Not Progressing     Problem: Skin/Tissue Integrity  Goal: Absence of new skin breakdown  Description: 1. Monitor for areas of redness and/or skin breakdown  2. Assess vascular access sites hourly  3. Every 4-6 hours minimum:  Change oxygen saturation probe site  4. Every 4-6 hours:  If on nasal continuous positive airway pressure, respiratory therapy assess nares and determine need for appliance change or resting period.   8/2/2022 2323 by Darren Valdes RN  Outcome: Progressing  8/2/2022 1007 by Everett Saba RN  Outcome: Not Progressing     Problem: ABCDS Injury Assessment  Goal: Absence of physical injury  8/2/2022 2323 by Darren Valdes RN  Outcome: Progressing  8/2/2022 1007 by Everett Saba RN  Outcome: Not Progressing     Problem: Discharge Planning  Goal: Discharge to home or other facility with appropriate resources  8/2/2022 2323 by Darren Valdes RN  Outcome: Progressing  8/2/2022 1007 by Everett Saba RN  Outcome: Not Progressing     Problem: Safety - Adult  Goal: Free from fall injury  8/2/2022 2323 by Darren Valdes RN  Outcome: Progressing  8/2/2022 1007 by Everett Saba RN  Outcome: Not Progressing     Problem: Chronic Conditions and Co-morbidities  Goal: Patient's chronic conditions and co-morbidity symptoms are monitored and maintained or improved  8/2/2022 2323 by Yolette Santos RN  Outcome: Progressing  8/2/2022 1007 by Sangita Benitez, RN  Outcome: Not Progressing     Problem: Skin/Tissue Integrity  Goal: Absence of new skin breakdown  Description: 1. Monitor for areas of redness and/or skin breakdown  2. Assess vascular access sites hourly  3. Every 4-6 hours minimum:  Change oxygen saturation probe site  4. Every 4-6 hours:  If on nasal continuous positive airway pressure, respiratory therapy assess nares and determine need for appliance change or resting period.   8/2/2022 2323 by Yolette Santos RN  Outcome: Progressing  8/2/2022 1007 by Sangita Benitez, RN  Outcome: Not Progressing     Problem: ABCDS Injury Assessment  Goal: Absence of physical injury  8/2/2022 2323 by Yolette Santos RN  Outcome: Progressing  8/2/2022 1007 by Sangita Benitez, RN  Outcome: Not Progressing

## 2022-08-03 NOTE — PROGRESS NOTES
Order to dc home noted, iv dcd, pt and daughter verbalize understanding of dc instructions.  Home with family pov, nad noted, wheeled out by staff

## 2022-08-03 NOTE — PLAN OF CARE
injury  8/3/2022 1025 by Krunal Garzon RN  Outcome: Progressing  8/2/2022 2323 by Hardeep Contreras RN  Outcome: Progressing

## 2022-08-03 NOTE — CARE COORDINATION
Harjinder Torres from [email or web address] called and pt was accepted for [facility] and they will .

## 2022-08-03 NOTE — DISCHARGE SUMMARY
Discharge Summary      Patient ID: Junella Embs      Patient's PCP: COURT Younger    Admit Date: 7/29/2022     Discharge Date:   8/3/2022    Admitting Provider: Braxton Harrell MD    Discharging Provider: MORRIS Vargas     Reason for this admission:   Dementia with behavioral disturbances  Generalized weakness  Recurrent falls    Discharge Diagnoses: Active Hospital Problems    Diagnosis Date Noted    Dementia with behavioral disturbance ([facility] [address] [F03.91] 08/01/2022     Priority: Medium    AMS (altered mental status) [R41.82] 07/30/2022     Priority: Medium    Hypercalcemia [E83.52] 07/29/2022     Priority: Medium    Hypomagnesemia [E83.42] 06/03/2022     Priority: Medium    Spinal stenosis [M48.00] 06/02/2022     Priority: Medium    Weight loss [R63.4] 06/02/2022     Priority: Medium    History of stroke [Z86.73] 04/05/2022    Hypokalemia [E87.6] 04/05/2022    Peripheral vascular disease ([facility] [address] [I73.9] 04/05/2022    Acute kidney injury superimposed on CKD ([facility] [address] [N17.9, N18.9] 02/01/2022    Generalized weakness [R53.1] 02/01/2022    Vitamin D deficiency [E55.9] 02/01/2022    Benign essential HTN [I10] 03/28/2011    Diabetes mellitus, type II ([facility] [address] [E11.9] 03/28/2011    Other specified hypothyroidism [E03.8] 03/28/2011       Procedures:  CT Head WO Contrast   Final Result      No acute intracranial pathology                 XR CHEST PORTABLE   Final Result      No acute pulmonary pathology or significant change from the prior exam                        Consults:   IP CONSULT TO DIETITIAN  IP CONSULT TO DIETITIAN  IP CONSULT TO HOME CARE NEEDS  PT OT  Case Management    Briefly:   67-year-old male with past medical history of diabetes mellitus type 2, hypertension, hypothyroidism, memory problem, PVD, CVA and HLD admitted for generalized weakness, recurrent falls and dementia with behavioral disturbances.   Per family report, patient with worsening behaviors at home including aggressive and combativeness towards family members. Patient also displaying worsening dementia. Family stated they were unable to care for patient any longer at home and were requesting nursing home placement. Has been medically cleared for days and case management working on placement. Placement obtained at [facility] for 8/3/2022 on 8/2/2022. Once family was notified, they stated they have changed their mind and wish to take patient home. Patient will be discharged home today. Hospital Course: Active Hospital Problems     Diagnosis Date Noted    Dementia with behavioral disturbance ([facility] [address] [F03.91]  -As per HPI, wife reports patient with increased agitation and combativeness at home. Wife no longer able to care for him and requested nursing home placement. Daughter planned to apply for guardianship. Pt also agreeable to NH placement.  -8/2: placement obtained at [facility] for 8/3. Once family was notified, they informed case management they had changed their mind and wish to take patient home.  -Continue Aricept, Seroquel    08/01/2022    AMS (altered mental status) [R41.82]  -suspect due to progression of known dementia  -No evidence of acute infection  -CT head 7/29 with no acute intracranial pathology  -Admission UDS negative  -TSH, B12 and folate within normal limits  -Ammonia 25    07/30/2022    Hypercalcemia [E83.52]  -Ca 11.7 on 7/29; PTH 36.3   -pt hydrated with resolution earlier in hospital stay; however, Ca now trending back up. Pt not on diuretics or calcium supplements. Pt encouraged to have good po intake and repeat CMP in 1 week. -encourage good po intake    07/29/2022    Hypomagnesemia [E83.42]  -replaced  -monitor and replete PRN    06/03/2022    Spinal stenosis [M48.00]  -chronic  -PT OT following; patient able to ambulate with assistance of walker    06/02/2022    Weight loss [R63.4]  -Patient and patient's family report ongoing weight loss over the last several months.   They also report decreased oral intake. -CT chest in January 2022 with no findings of occult malignancy  -CT abdomen pelvis 6/22 without acute finding or acute malignancy  -PSA within normal limits  -Could consider outpatient EGD and colonoscopy to evaluate for causes of weight loss/occult malignancy; will defer to PCP   -dietitian following    06/02/2022    History of stroke [Z86.73]  -Continue aspirin, Crestor    04/05/2022    Hypokalemia [E87.6]  -Replaced  -Monitor and replete as needed    04/05/2022    Peripheral vascular disease [facility] [I73.9]  -Continue aspirin, Pletal and Crestor    04/05/2022    Acute kidney injury superimposed on CKD ([facility] [N17.9, N18.9]  -Serum creatinine 1.7 on admission; serum creatinine down trended to 1.3 after IV fluids and improved p.o. intake  -Avoid nephrotoxins and NSAIDs  -Encourage good p.o. intake  -Suspect MARIE is prerenal secondary to dehydration from poor p.o. intake    02/01/2022    Generalized weakness [R53.1]  -Suspect secondary to dehydration, electrolyte abnormalities, dementia and advanced age  -PT OT following  -B12, folate, TSH are within normal limits  -Fall precautions    02/01/2022    Benign essential HTN [I10]  -Blood pressure noted to be elevated with current regimen.  -continue metoprolol, losartan (at increased dose)  -add hydralazine 25mg TID 8/2 03/28/2011    Diabetes mellitus, type II ([facility] [address] [E11.9]  -A1c 7.9 on 4/4/2022  -Continue Nesina  -diabetic diet    03/28/2011    Other specified hypothyroidism [E03.8]  -TSH 0.58  -Continue Synthroid     Vitamin d deficiency  -add vitamin d supplement 8/1          Disposition: home    Discharged Condition: Stable    Vital Signs  Temp: 98.4 °F (36.9 °C)  Heart Rate: 73  Resp: 18  BP: 119/61  SpO2: 94 %  O2 Device: None (Room air)       Vital signs reviewed in electronic chart.     Physical exam  Constitutional:  Well developed, thin elderly gentleman in no acute distress  Eyes:  no scleral icterus, conjunctiva normal  HENT: Atraumatic, external ears normal, nose normal, oropharynx moist, no pharyngeal exudates. Neck- supple, no JVD, no lymphadenopathy  Respiratory:  No respiratory distress on RA, no wheezing, rales or rhonchi detected  Cardiovascular:  Normal rate, normal rhythm, no murmurs, no gallops, no rubs, trace ble edema  GI:  Soft, thin, nondistended, normal bowel sounds, nontender, no voluntary guarding  Musculoskeletal:  No cyanosis or obvious acute deformity. Moving all extremities with mild generalized weakness throughout  Integument:  Warm and dry. Neurologic:  Alert & oriented x to self, place, situation. Answers questions appropriately. Memory problem noted.  strength intact and equal bilaterally. no apparent focal deficits noted  Psychiatric:  Speech and behavior appropriate       Activity: activity as tolerated  Diet:  Soft and bite-size, diabetic  Follow Up: Primary Care Physician in 1 week    Labs: For convenience and continuity at follow-up the following most recent labs are provided:    CBC:   Lab Results   Component Value Date/Time    WBC 7.2 08/01/2022 05:45 AM    HGB 13.9 08/01/2022 05:45 AM    HCT 41.7 08/01/2022 05:45 AM     08/01/2022 05:45 AM       RENAL:   Lab Results   Component Value Date/Time     08/01/2022 05:45 AM    K 3.5 08/01/2022 05:45 AM    K 3.0 07/30/2022 04:42 AM     08/01/2022 05:45 AM    CO2 21 08/01/2022 05:45 AM    BUN 10 08/01/2022 05:45 AM    CREATININE 1.3 08/01/2022 05:45 AM         Discharge Medications:     Current Discharge Medication List             Details   hydrALAZINE (APRESOLINE) 25 MG tablet Take 1 tablet by mouth in the morning and 1 tablet at noon and 1 tablet before bedtime. Qty: 90 tablet, Refills: 3      losartan (COZAAR) 50 MG tablet Take 1 tablet by mouth in the morning.   Qty: 30 tablet, Refills: 3      donepezil (ARICEPT) 5 MG tablet Take 1 tablet by mouth nightly  Qty: 30 tablet, Refills: 3                Details   vitamin D (ERGOCALCIFEROL) 1.25 MG (91481 UT) CAPS capsule Take 1 capsule by mouth once a week  Qty: 8 capsule, Refills: 0                Details   QUEtiapine (SEROQUEL) 25 MG tablet Take 1 tablet by mouth at bedtime  Qty: 30 tablet, Refills: 2    Associated Diagnoses: Behavior disturbance      tamsulosin (FLOMAX) 0.4 mg capsule TAKE 1 CAPSULE BY MOUTH DAILY  Qty: 30 capsule, Refills: 3    Associated Diagnoses: Stage 3 chronic kidney disease, unspecified whether stage 3a or 3b CKD (HCC)      metoprolol tartrate (LOPRESSOR) 25 MG tablet Take 0.5 tablets by mouth 2 times daily  Qty: 60 tablet, Refills: 3      Multiple Vitamins-Minerals (THERAPEUTIC MULTIVITAMIN-MINERALS) tablet Take 1 tablet by mouth daily  Qty: 30 tablet, Refills: 0      aspirin 81 MG chewable tablet Take 1 tablet by mouth daily  Qty: 30 tablet, Refills: 3      SITagliptin (JANUVIA) 50 MG tablet Take 2 tablets by mouth daily  Qty: 60 tablet, Refills: 0      rosuvastatin (CRESTOR) 20 MG tablet Take 20 mg by mouth daily      omeprazole (PRILOSEC) 20 MG delayed release capsule Take 20 mg by mouth daily      cilostazol (PLETAL) 100 MG tablet Take 1 tablet by mouth 2 times daily. Qty: 60 tablet, Refills: 6              Patient was seen and examined by Dr. Elan Blanco and plan of care reviewed. Treatment plan was formulated collaboratively. Signed:  Electronically signed by MORRIS Chavez on 8/3/2022 at 11:25 AM       Thank you COURT Irene for the opportunity to be involved in this patient's care. If you have any questions or concerns please feel free to contact me at [phone].

## 2022-08-03 NOTE — FLOWSHEET NOTE
08/03/22 0800   Assessment   Charting Type Shift assessment   Psychosocial   Psychosocial (WDL) X   Patient Behaviors Restless  (at times)   Neurological   Neuro (WDL) X   Level of Consciousness Alert (0)   Orientation Level Oriented X4   Cognition Follows commands;Poor judgement;Poor safety awareness;Poor attention/concentration   Speech Clear   R Hand  Strong   L Hand  Strong   R Foot Dorsiflexion Strong   L Foot Dorsiflexion Strong   [place] Coma Scale   Eye Opening 4   Best Verbal Response 4   Best Motor Response 6   [place] Coma Scale Score 14   HEENT (Head, Ears, Eyes, Nose, & Throat)   HEENT (WDL) X   Right Eye Glasses   Left Eye Glasses   Right Ear Impaired hearing   Left Ear Impaired hearing   Teeth Missing teeth   Respiratory   Respiratory (WDL) WDL   Respiratory Interventions Cough & deep breathe   Breath Sounds   Right Upper Lobe Clear   Right Middle Lobe Clear   Right Lower Lobe Diminished   Left Upper Lobe Clear   Left Lower Lobe Diminished   Cardiac   Cardiac (WDL) WDL   Cardiac Regularity Regular   Heart Sounds S1, S2   Cardiac Rhythm Sinus rhythm;Sinus maricel   Cardiac Monitor   Telemetry Box Number    Cardiac/Telemetry Monitor On Portable telemetry pack applied   Telemetry Monitor Alarm Parameters 50/120   Gastrointestinal   Abdominal (WDL) WDL   Genitourinary   Genitourinary (WDL) WDL   Peripheral Vascular   Peripheral Vascular (WDL) WDL   Skin Integumentary    Skin Integumentary (WDL) X   Skin Color Pink   Skin Condition/Temp Warm;Dry   Musculoskeletal   Musculoskeletal (WDL) X   RL Extremity Weakness   LL Extremity Weakness   Care Plan - Chronic Conditions and Co-Morbidity   Care Plan - Patient's Chronic Conditions and Co-Morbidity Symptoms are Monitored and Maintained or Improved Monitor and assess patient's chronic conditions and comorbid symptoms for stability, deterioration, or improvement;Collaborate with multidisciplinary team to address chronic and comorbid conditions and prevent exacerbation or deterioration;Update acute care plan with appropriate goals if chronic or comorbid symptoms are exacerbated and prevent overall improvement and discharge   Care Plan - Discharge Planning Goals   Discharge to home or other facility with appropriate resources Identify barriers to discharge with patient and caregiver

## 2022-08-03 NOTE — DISCHARGE INSTR - DIET
Good nutrition is important when healing from an illness, injury, or surgery. Follow any nutrition recommendations given to you during your hospital stay. If you were given an oral nutrition supplement while in the hospital, continue to take this supplement at home. You can take it with meals, in-between meals, and/or before bedtime. These supplements can be purchased at most local grocery stores, pharmacies, and chain [employer]-stores. If you have any questions about your diet or nutrition, call the hospital and ask for the dietitian.     Diabetic diet

## 2022-08-04 ENCOUNTER — TELEPHONE (OUTPATIENT)
Dept: PRIMARY CARE CLINIC | Age: 82
End: 2022-08-04

## 2022-08-04 NOTE — ADT AUTH CERT
Utilization Reviews         Neurology [address] Day 4 (8/1/2022) by Anju Isidro RN       Review Status Review Entered   Completed 8/2/2022 15:12      Criteria Review      Care Day: 4 Care Date: 8/1/2022 Level of Care: Inpatient Floor    Guideline Day 3    Level Of Care    (X) * Activity level acceptable    8/2/2022 3:12 PM EDT by Bebeto Newman      up with assistance of walker    (X) Floor to discharge    8/2/2022 3:12 PM EDT by Bebeto Newman      med/surg    ( ) * Complete discharge planning    Clinical Status    (X) * No infection, or status acceptable    8/2/2022 3:12 PM EDT by Bebeto Newman      no infection noted    (X) * Isolation not needed, or status acceptable    8/2/2022 3:12 PM EDT by Bebeto Newman      no isolation    (X) * Respiratory status acceptable    8/2/2022 3:12 PM EDT by Bebeto Newman      resp 18, o2 sat 97% on ra    (X) * Pain and nausea absent or adequately managed    8/2/2022 3:12 PM EDT by Bebeto Newman      no pain or nausea noted    (X) * Ventilatory status acceptable    8/2/2022 3:12 PM EDT by Bebeto Newman      adequate ventilation    (X) * Neurologic problems absent or stabilized    8/2/2022 3:12 PM EDT by Bebeto Newman      baseline neuro status    (X) * Muscle or nerve damage absent or stable    8/2/2022 3:12 PM EDT by Bebeto Newman      no muscle or nerve damage noted    ( ) * General Discharge Criteria met    Interventions    (X) * Intake acceptable    8/2/2022 3:12 PM EDT by Bebeto Newman      oral hydration  dysphagia soft carb control diet    ( ) * No inpatient interventions needed    * Milestone   Additional Notes   Dnr   Strict I&o q8hrs   Routine vitals q4hrs   Incentive spirometer daily   Continuous telemetry monitoring         Wbc 7.2, hgb 13.9, hct 41.7, glucose 149, creat 1.3, gfr 53, total protein 5.6         Bp: 168/70, pulse 72, resp 18, temp 97.9, o2 sat 97% on ra            Nesina 6.25mg po daily   Asa 81mg po daily   Pletal 100mg po bid   Aricept 5mg po qhs   Lovenox 40mg sub q daily   Cozaar 25mg po daily-d/c'd   Lopressor 12.5mg po bid   Nicotine patch 14mg/24hrs 1 patch daily   Protonix 40mg po daily   Micro k 40meq po x1   Seroquel 25mg po qhs   Crestor 20mg po daily   Flomax 0.4mg po daily   Vit d 50,000 units po weekly               Progress Note           Subjective:   Chief complaint:    Weakness, recurrent falls   Dementia with behavioral disturbances       Interval History:    Patient seen and examined this morning. He was ambulating with his walker in his room independently. Wife at bedside. Wife reports patient has had episodes of agitation and combativeness at home. She states she is no longer able to care for him at home. Daughter trying to obtain guardianship and request nursing home placement. Case management aware. Referral sent. Assessment and Plan: Active Hospital Problems     Diagnosis Date Noted   · Dementia with behavioral disturbance ([facility] [address] [F03.91]   -As per HPI, wife reports patient with increased agitation and combativeness at home. She is no longer able to care for him and is requesting nursing home placement.   Daughter plans to apply for guardianship today.   -Continue Aricept, Seroquel     08/01/2022   · AMS (altered mental status) [R41.82]   -suspect due to progression of known dementia   -No evidence of acute infection   -CT head 7/29 with no acute intracranial pathology   -Admission UDS negative   -TSH, B12 and folate within normal limits   -Ammonia 25     07/30/2022   · Hypercalcemia [E83.52]   -Ca 11.7 on 7/29; PTH 36.3    -pt hydrated with resolution.   -resolved   -encourage good po intake     07/29/2022   · Hypomagnesemia [E83.42]   -replaced   -monitor and replete PRN     06/03/2022   · Spinal stenosis [M48.00]   -chronic   -PT OT following; patient able to ambulate with assistance of walker     06/02/2022   · Weight loss [R63.4]   -Patient and patient's family report ongoing weight loss over the last several months. They also report decreased oral intake. -CT chest in January 2022 with no findings of occult malignancy   -CT abdomen pelvis 6/22 without acute finding or acute malignancy   -PSA within normal limits   -Could consider outpatient EGD and colonoscopy to evaluate for causes of weight loss/occult malignancy; will defer to PCP    -dietitian following     06/02/2022   · History of stroke [Z86.73]   -Continue aspirin, Crestor     04/05/2022   · Hypokalemia [E87.6]   -Replaced   -Monitor and replete as needed     04/05/2022   · Peripheral vascular disease ([facility] [address] [I73.9]   -Continue aspirin, Pletal and Crestor     04/05/2022   · Acute kidney injury superimposed on CKD ([facility] [address] [N17.9, N18.9]   -Serum creatinine 1.7 on admission; serum creatinine down trended to 1.3 after IV fluids and improved p.o. intake   -Avoid nephrotoxins and NSAIDs   -Encourage good p.o. intake   -Suspect MARIE is prerenal secondary to dehydration from poor p.o. intake     02/01/2022   · Generalized weakness [R53.1]   -Suspect secondary to dehydration, electrolyte abnormalities, dementia and advanced age   -PT OT following   -B12, folate, TSH are within normal limits   -Fall precautions     02/01/2022   · Benign essential HTN [I10]   Blood pressure noted to be elevated despite home regimen of metoprolol and losartan. Will increase losartan today and monitor. 03/28/2011   · Diabetes mellitus, type II ([facility] [address] [E11.9]   -A1c 7.9 on 4/4/2022   -Continue Nesina   -dc SSI and accuchecks   -diabetic diet     03/28/2011   · Other specified hypothyroidism [E03.8]   -TSH 0.58   -Continue Synthroid       Vitamin d deficiency   -add vitamin d supplement 8/1 03/28/2011      Electronically signed by MORRIS Taylor on 8/1/2022 at 12:29 PM                    Case management/dc planning note:      CM spoke with family. Daughter, Yanci Stephenson, is willing to get guardianship. Requesting NHP at [facility] (near family).   Agreeable to [facility] if Jodie [facility]low is not able to take. CM to send referral to [facility])>            Neurology [address] Day 3 (7/31/2022) by Antonio Montelongo RN       Review Status Review Entered   Completed 8/2/2022 14:50      Criteria Review      Care Day: 3 Care Date: 7/31/2022 Level of Care: Inpatient Floor    Guideline Day 2    Level Of Care    (X) Floor    8/2/2022 2:50 PM EDT by June Rogers      med/surg    Clinical Status    ( ) * No ICU or intermediate care needs    Interventions    (X) Inpatient interventions continue    8/2/2022 2:50 PM EDT by June Rogers      strict i&o q8hrs  routine vitals q4hrs  incentive spirometer daily  continuous telemetry monitoring  fall precautions  up with assistance  accu checks qid    * Milestone   Additional Notes   Wbc 6.9, hgb 13.8, hct 41.6, ammonia 25, tsh 0.58, mag 2.0, glucose 114, total protein 5.7         Bp: 151/78, pulse 73, resp 1, temp 98.4, o2 sat 98% on ra         Nesina 6.25mg po daily   Asa 81mg po daily   Pletal 100mg po bid   Aricept 5mg po qhs   Lovenox 40mg sub q daily   Humalog sliding scale 0-4 units sub q qhs   Humalog sliding scale 0-4 units sub q tid   Cozaar 25mg po daily   Lopressor 12.5mg po bid   Nicotine patch 14mg/24hrs 1 patch daily   Protonix 40mg po daily   Seroquel 25mg po qhs   Crestor 20mg po daily   Banana bag iv x1   Flomax 0.4mg po daily      Ns @ 75 cc/hr-d/c's               Progress Note           Subjective:   Chief complaint: weakness, fall, memory problem        Interval History:    Resting in bed this morning. Says he is feeling better overall. Labs improved. BP elevated this morning. He wants to go home soon but agreeable to staying overnight for PT/OT eval tomorrow.  Admits that he feels frustrated when he can't seem to say what he wants or do what he wants and also that his memory seems to be worsening      Assessment and Plan:       · AMS (altered mental status) [R41.82]   - suspect related to electrolyte abnormalities including hypercalcemia in setting of dehydration and ?dementia   - hydrated with IVFs and rally pack   - replace electrolytes as needed   - 7/30 added aricept 5 mg nightly for suspected dementia   - fall precautions    - mental status improved and seems to be at baseline - monitor      07/30/2022   · Memory problem [R41.3]   - suspect patient with dementia and started trial of aricept   - defer further evaluation to pcp      07/30/2022   · Hypercalcemia [E83.52]   - Ca 11.7 7/29 suspect related to dehydration with decreased oral intake   - hydrated with IVFs and Ca 10 on 7/30, 10.2 7/31    - monitor   - encourage oral intake     07/29/2022   · Hypomagnesemia [E83.42]   - monitor and replace as needed     06/03/2022   · Spinal stenosis [M48.00]   - chronic   - pt/ot consult when available   - fall precautions      06/02/2022   · Weight loss [R63.4]   - patient with ongoing weight loss over last several months as well as decreased oral intake   - ct chest jan 2022 with pneumonia otherwise no acute finding   - with abd pain and weight loss obtained ct abd/pelv 6/2 without acute finding   - psa wnl    - dietitian consulted    - could consider upper/lower endoscopy as op if patient agreeable - defer to pcp    - monitor      06/02/2022   · History of stroke [Z86.73]   - continue current regimen      04/05/2022   · Hypokalemia [E87.6]   - monitor and replace as needed     04/05/2022   · Peripheral vascular disease ([facility] [address] [I73.9]   - continue current regimen      04/05/2022   · Acute kidney injury superimposed on CKD ([facility] [address] [N17.9, N18.9]   - renal function has fluctuated over the last several months   - Cr 1.7 7/29 (1.4 on 6/4/22 and previously worse than this)   - hydrated with IVFs and Cr trended down to 1.2   - avoid nephrotoxic agent as able   - monitor      02/01/2022   · Generalized weakness [R53.1]   - in setting of advanced age, suspected dehydration, electrolyte abnormalities, suspected dementia, etc   - pt/ot   - fall precautions   - b12, folate, tsh wnl. Vit d pending.   - monitor      02/01/2022   · Benign essential HTN [I10   - continue home metoprolol    - during previous admit multiple anti hypertensives dc'd due to hypotension, falls, weakness   - 7/31 BP elevated. Start losartan 25 mg daily and monitor. 03/28/2011   · Diabetes mellitus, type II ([facility] [address] [E11.9]   - A1C 7.9 4/4/22   - continue renally dosed nesina   - monitor fsbg achs and cover with low dose ssi as needed      03/28/2011   · Other specified hypothyroidism [E03.8]   - TSH wnl   - no levothyroxine noted on current home med list although has previously been prescribed.  Recommend ongoing tsh monitoring with pcp. 03/28/2011      Electronically signed by MORRIS Brown on 7/31/2022 at 10:46 AM

## 2022-08-04 NOTE — TELEPHONE ENCOUNTER
HH called requesting orders for nursing to go in once a week for 4 weeks and an order for speech eval     [phone]

## 2022-08-05 ENCOUNTER — CARE COORDINATION (OUTPATIENT)
Dept: CARE COORDINATION | Age: 82
End: 2022-08-05

## 2022-08-05 LAB
EKG ATRIAL RATE: 67 BPM
EKG ATRIAL RATE: 72 BPM
EKG DIAGNOSIS: NORMAL
EKG DIAGNOSIS: NORMAL
EKG P AXIS: 47 DEGREES
EKG P AXIS: 54 DEGREES
EKG P-R INTERVAL: 182 MS
EKG P-R INTERVAL: 190 MS
EKG Q-T INTERVAL: 374 MS
EKG Q-T INTERVAL: 390 MS
EKG QRS DURATION: 78 MS
EKG QRS DURATION: 82 MS
EKG QTC CALCULATION (BAZETT): 409 MS
EKG QTC CALCULATION (BAZETT): 412 MS
EKG R AXIS: -34 DEGREES
EKG R AXIS: -47 DEGREES
EKG T AXIS: 42 DEGREES
EKG T AXIS: 55 DEGREES
EKG VENTRICULAR RATE: 67 BPM
EKG VENTRICULAR RATE: 72 BPM

## 2022-08-05 NOTE — CARE COORDINATION
[facility] Transitions Initial Follow Up Call    Call within 2 business days of discharge: Yes     Patient: Jerzy Palacios Patient :  MRN: [id number]    Last Discharge [facility]       Date Complaint Diagnosis Description Type Department Provider    22 Dementia; Anorexia Hypercalcemia . .. ED to Hosp-Admission (Discharged) (ADMITTED) [facility] [place] Raudel Alvarado MD; Jabari Orlando DO            RARS: Readmission Risk Score: 18.6       Spoke with: Daughter Lopez Cleary is at home with Everett Blackburn and her mother today. She tells me she is staying with them and that \"it's not going well at all\". She states that he isn't eating or drinking and they think they made a mistake and he needs to go to the nursing home. She is going to speak to her sister and confirm their wishes. I let her know that I could contact [address] [place] [address] that had accepted him a couple of days ago but didn't know if it was still available. Para Evin V/O. I contacted [facility] and they have requested records from admission to be faxed to them for review. Discharge department/facility: [facility]    Non-face-to-face services provided:  Scheduled appointment with PCP-Cathi  Obtained and reviewed discharge summary and/or continuity of care documents  Establishment or re-establishment of referrals-Laureano Devine [address] [place] [address].      Follow Up  Future Appointments   Date Time Provider Yocasta Kessler   8/15/2022  2:00 PM COURT [facility]on [facility]   10/25/2022 10:30 AM Benjamin Wei, [address] [place] [address]       Ryan Goetz RN

## 2022-08-09 ENCOUNTER — CARE COORDINATION (OUTPATIENT)
Dept: CARE COORDINATION | Age: 82
End: 2022-08-09

## 2022-08-09 NOTE — CARE COORDINATION
Hamzah Clarke, RN  Manager Care Coordination  [phone]   I placed a call to Mrs. Llanes to follow up on Sadia. She had me speak to her daughter Anitha Thao. She tells me that she did call the [facility] home yesterday and didn't really understand what they told her. I called [facility] and spoke with Patito Roberto, admissions coordinator. She confirmed that they did get the documents faxed to them Friday and have resubmitted him for pre certification with his insurance. They are waiting to hear back and tell me that this can take a few days or even a week. They will reach out to [address] family when this comes through. I called Anitha Thao back and relayed this information through voice mail. Contact information was reviewed.

## 2022-08-11 ENCOUNTER — HOSPITAL ENCOUNTER (OUTPATIENT)
Facility: HOSPITAL | Age: 82
Discharge: HOME | End: 2022-08-11
Payer: MEDICARE

## 2022-08-11 DIAGNOSIS — Z13.29 THYROID DISORDER SCREEN: ICD-10-CM

## 2022-08-11 DIAGNOSIS — E11.22 TYPE 2 DIABETES MELLITUS WITH STAGE 3 CHRONIC KIDNEY DISEASE, UNSPECIFIED WHETHER LONG TERM INSULIN USE, UNSPECIFIED WHETHER STAGE 3A OR 3B CKD (HCC): ICD-10-CM

## 2022-08-11 DIAGNOSIS — E55.9 VITAMIN D DEFICIENCY: ICD-10-CM

## 2022-08-11 DIAGNOSIS — R41.89 COGNITIVE AND BEHAVIORAL CHANGES: ICD-10-CM

## 2022-08-11 DIAGNOSIS — E83.52 HYPERCALCEMIA: ICD-10-CM

## 2022-08-11 DIAGNOSIS — E78.00 PURE HYPERCHOLESTEROLEMIA: ICD-10-CM

## 2022-08-11 DIAGNOSIS — N18.30 TYPE 2 DIABETES MELLITUS WITH STAGE 3 CHRONIC KIDNEY DISEASE, UNSPECIFIED WHETHER LONG TERM INSULIN USE, UNSPECIFIED WHETHER STAGE 3A OR 3B CKD (HCC): ICD-10-CM

## 2022-08-11 DIAGNOSIS — R46.89 COGNITIVE AND BEHAVIORAL CHANGES: ICD-10-CM

## 2022-08-11 LAB
A/G RATIO: 2 (ref 0.8–2)
ALBUMIN SERPL-MCNC: 4.2 G/DL (ref 3.4–4.8)
ALP BLD-CCNC: 63 U/L (ref 25–100)
ALT SERPL-CCNC: 12 U/L (ref 4–36)
ANION GAP SERPL CALCULATED.3IONS-SCNC: 14 MMOL/L (ref 3–16)
AST SERPL-CCNC: 13 U/L (ref 8–33)
BILIRUB SERPL-MCNC: 0.9 MG/DL (ref 0.3–1.2)
BUN BLDV-MCNC: 20 MG/DL (ref 6–20)
CALCIUM SERPL-MCNC: 11.4 MG/DL (ref 8.5–10.5)
CHLORIDE BLD-SCNC: 106 MMOL/L (ref 98–107)
CHOLESTEROL, TOTAL: 152 MG/DL (ref 0–200)
CO2: 23 MMOL/L (ref 20–30)
CREAT SERPL-MCNC: 1.7 MG/DL (ref 0.4–1.2)
FOLATE: 18.98 NG/ML
GFR AFRICAN AMERICAN: 47
GFR NON-AFRICAN AMERICAN: 39
GLOBULIN: 2.1 G/DL
GLUCOSE BLD-MCNC: 123 MG/DL (ref 74–106)
HCT VFR BLD CALC: 43.9 % (ref 40–54)
HDLC SERPL-MCNC: 62 MG/DL (ref 40–60)
HEMOGLOBIN: 14.3 G/DL (ref 13–18)
LDL CHOLESTEROL CALCULATED: 70 MG/DL
MCH RBC QN AUTO: 29.1 PG (ref 27–32)
MCHC RBC AUTO-ENTMCNC: 32.6 G/DL (ref 31–35)
MCV RBC AUTO: 89.2 FL (ref 80–100)
PDW BLD-RTO: 14.3 % (ref 11–16)
PLATELET # BLD: 295 K/UL (ref 150–400)
PMV BLD AUTO: 9.7 FL (ref 6–10)
POTASSIUM SERPL-SCNC: 3.9 MMOL/L (ref 3.4–5.1)
RBC # BLD: 4.92 M/UL (ref 4.5–6)
SODIUM BLD-SCNC: 143 MMOL/L (ref 136–145)
TOTAL PROTEIN: 6.3 G/DL (ref 6.4–8.3)
TRIGL SERPL-MCNC: 99 MG/DL (ref 0–249)
TSH SERPL DL<=0.05 MIU/L-ACNC: 0.85 UIU/ML (ref 0.27–4.2)
VITAMIN B-12: 425 PG/ML (ref 211–911)
VITAMIN D 25-HYDROXY: 59.5 (ref 32–100)
VLDLC SERPL CALC-MCNC: 20 MG/DL
WBC # BLD: 7.2 K/UL (ref 4–11)

## 2022-08-11 PROCEDURE — 82607 VITAMIN B-12: CPT

## 2022-08-11 PROCEDURE — 85027 COMPLETE CBC AUTOMATED: CPT

## 2022-08-11 PROCEDURE — 80053 COMPREHEN METABOLIC PANEL: CPT

## 2022-08-11 PROCEDURE — 82306 VITAMIN D 25 HYDROXY: CPT

## 2022-08-11 PROCEDURE — 82746 ASSAY OF FOLIC ACID SERUM: CPT

## 2022-08-11 PROCEDURE — 80061 LIPID PANEL: CPT

## 2022-08-11 PROCEDURE — 84443 ASSAY THYROID STIM HORMONE: CPT

## 2022-08-11 NOTE — TELEPHONE ENCOUNTER
Patient is being seen by [facility] and has speech therapy. Still working on nursing home placement.

## 2022-08-15 ENCOUNTER — OFFICE VISIT (OUTPATIENT)
Dept: PRIMARY CARE CLINIC | Age: 82
End: 2022-08-15
Payer: MEDICARE

## 2022-08-15 VITALS
RESPIRATION RATE: 24 BRPM | TEMPERATURE: 97.3 F | SYSTOLIC BLOOD PRESSURE: 98 MMHG | OXYGEN SATURATION: 92 % | BODY MASS INDEX: 23.37 KG/M2 | WEIGHT: 154.2 LBS | HEIGHT: 68 IN | DIASTOLIC BLOOD PRESSURE: 60 MMHG | HEART RATE: 81 BPM

## 2022-08-15 DIAGNOSIS — R46.89 COGNITIVE AND BEHAVIORAL CHANGES: Primary | ICD-10-CM

## 2022-08-15 DIAGNOSIS — R53.81 DECLINING FUNCTIONAL STATUS: ICD-10-CM

## 2022-08-15 DIAGNOSIS — I95.89 HYPOTENSION DUE TO HYPOVOLEMIA: ICD-10-CM

## 2022-08-15 DIAGNOSIS — R41.89 COGNITIVE AND BEHAVIORAL CHANGES: Primary | ICD-10-CM

## 2022-08-15 DIAGNOSIS — E87.6 HYPOKALEMIA: ICD-10-CM

## 2022-08-15 DIAGNOSIS — E83.52 HYPERCALCEMIA: ICD-10-CM

## 2022-08-15 DIAGNOSIS — N18.30 TYPE 2 DIABETES MELLITUS WITH STAGE 3 CHRONIC KIDNEY DISEASE, UNSPECIFIED WHETHER LONG TERM INSULIN USE, UNSPECIFIED WHETHER STAGE 3A OR 3B CKD (HCC): ICD-10-CM

## 2022-08-15 DIAGNOSIS — E86.1 HYPOTENSION DUE TO HYPOVOLEMIA: ICD-10-CM

## 2022-08-15 DIAGNOSIS — E11.22 TYPE 2 DIABETES MELLITUS WITH STAGE 3 CHRONIC KIDNEY DISEASE, UNSPECIFIED WHETHER LONG TERM INSULIN USE, UNSPECIFIED WHETHER STAGE 3A OR 3B CKD (HCC): ICD-10-CM

## 2022-08-15 PROCEDURE — 99495 TRANSJ CARE MGMT MOD F2F 14D: CPT | Performed by: NURSE PRACTITIONER

## 2022-08-15 NOTE — PROGRESS NOTES
tablet 3    Multiple Vitamins-Minerals (THERAPEUTIC MULTIVITAMIN-MINERALS) tablet Take 1 tablet by mouth daily 30 tablet 0    aspirin 81 MG chewable tablet Take 1 tablet by mouth daily 30 tablet 3    SITagliptin (JANUVIA) 50 MG tablet Take 2 tablets by mouth daily 60 tablet 0    rosuvastatin (CRESTOR) 20 MG tablet Take 20 mg by mouth daily      omeprazole (PRILOSEC) 20 MG delayed release capsule Take 20 mg by mouth daily      cilostazol (PLETAL) 100 MG tablet Take 1 tablet by mouth 2 times daily. 60 tablet 6         Vitals:    08/15/22 1350   BP: 98/60   Site: Left Upper Arm   Position: Sitting   Cuff Size: Medium Adult   Pulse: 81   Resp: 24   Temp: 97.3 °F (36.3 °C)   TempSrc: Temporal   SpO2: 92%   Weight: 154 lb 3.2 oz (69.9 kg)   Height: 5' 8\" (1.727 m)     Body mass index is 23.45 kg/m². Wt Readings from Last 3 Encounters:   08/15/22 154 lb 3.2 oz (69.9 kg)   07/29/22 157 lb (71.2 kg)   07/22/22 157 lb (71.2 kg)     BP Readings from Last 3 Encounters:   08/15/22 98/60   08/03/22 119/61   07/22/22 134/62        Patient was admitted to [facility] from 07/29/2022 to 08/03/2022 for Hypercalcemia,Hyperkalemia,dementia and falls. He has had one fall at home since returning home. He is cared for by his wife and daughter. He is frustrated about them taking his keeys. He gets mad. He has been having a lot more memory issues. He has not been eating well which is part of what was causing the renal failure hypercalcemia it all improved with hydration and potassium replacement. He did have a CT of the abdomen and pelvis in the last 6 months his PSA was normal calcium levels had drifted up slightly. PTH was checked and it was normal.  He was cautioned about the need for appropriate p.o. intake both him and his family. His daughter is with him today.   He was offered inpatient rehab at the nursing home and or nursing home placement initially the family wanted placement and then they changed her mind and took him home. The daughter admitted that since coming home that there is a lot of issues dealing with the mother and him and her other sister. They may decide to seek placement again. Inpatient course: Discharge summary reviewed- see chart. Current status: fair    Review of Systems:  A comprehensive review of systems was negative except for what was noted in the HPI. Physical Exam:  General Appearance: alert and oriented to person, place and time, well developed and well- nourished, in no acute distress  Skin: warm and dry, no rash or erythema  Head: normocephalic and atraumatic  Eyes: pupils equal, round, and reactive to light, extraocular eye movements intact, conjunctivae normal  ENT: tympanic membrane, external ear and ear canal normal bilaterally, nose without deformity, nasal mucosa and turbinates normal without polyps  Neck: supple and non-tender without mass, no thyromegaly or thyroid nodules, no cervical lymphadenopathy  Pulmonary/Chest: clear to auscultation bilaterally- no wheezes, rales or rhonchi, normal air movement, no respiratory distress  Cardiovascular: normal rate, regular rhythm, normal S1 and S2, no murmurs, rubs, clicks, or gallops, distal pulses intact, no carotid bruits  Abdomen: soft, non-tender, non-distended, normal bowel sounds, no masses or organomegaly  Extremities: no cyanosis, clubbing or edema  Musculoskeletal: normal range of motion, no joint swelling, deformity or tenderness bilateral weakness and poor coordination. Neurologic: Both long-term and short-term memory are impaired. nitial post-discharge communication occurred between nurse care coordinator and caregiver- daugther  on - see documentation in chart: telephone encounter. Assessment/Plan:  Pepito Keene was seen today for follow-up from hospital.    Diagnoses and all orders for this visit:    Cognitive and behavioral changes  Started on [employer]  consider nursing home placement. Home health in place. Hypotension due to hypovolemia  Advised to increase po intake. Type 2 diabetes mellitus with stage 3 chronic kidney disease, unspecified whether long term insulin use, unspecified whether stage 3a or 3b CKD ([facility] [address]  Lab Results   Component Value Date    CREATININE 1.7 (H) 08/11/2022     At baseline. Monitor level and increase fluids. Declining functional status  Physical therapy. Home health  Hypercalcemia  Levels corrected monitor stablity. Hypokalemia    Replaced and monitor level.       Diagnostic test results reviewed: inpatient labs and CT-abdomin pelvis and chest    Patient risk of morbidity and mortality: high    Medical Decision Making: high complexity

## 2022-08-15 NOTE — PATIENT INSTRUCTIONS
Keep a list of your medicines with you. List all of the prescription medicines, nonprescription medicines, supplements, natural remedies, and vitamins that you take. Tell your healthcare providers who treat you about all of the products you are taking. Your provider can provide you with a form to keep track of them. Just ask. Follow the directions that come with your medicine, including information about food or alcohol. Make sure you know how and when to take your medicine. Do not take more or less than you are supposed to take. Keep all medicines out of the reach of children. Store medicines according to the directions on the label. Monitor yourself. Learn to know how your body reacts to your new medicine and keep track of how it makes you feel before attempting (If your provider has allowed you to do so) to drive or go to work. Seek emergency medical attention if you think you have used too much of this medicine. An overdose of any prescription medicine can be fatal. Overdose symptoms may include extreme drowsiness, muscle weakness, confusion, cold and clammy skin, pinpoint pupils, shallow breathing, slow heart rate, fainting, or coma. Don't share prescription medicines with others, even when they seem to have the same symptoms. What may be good for you may be harmful to others. If you are no longer taking a prescribed medication and you have pills left please take your pills out of their original containers. Mix crushed pills with an undesirable substance, such as cat litter or used coffee grounds. Put the mixture into a disposable container with a lid, such as an empty margarine tub, or into a sealable bag. Cover up or remove any of your personal information on the empty containers by covering it with black permanent marker or duct tape. Place the sealed container with the mixture, and the empty drug containers, in the trash.    If you use a medication that is in the form of a patch, dispose of used patches by folding them in half so that the sticky sides meet, and then flushing them down a toilet. They should not be placed in the household trash where children or pets can find them. If you have any questions, ask your provider or pharmacist for more information. Be sure to keep all appointments for provider visits or tests. We are committed to providing you with the best care possible. In order to help us achieve these goals please remember to bring all medications, herbal products, and over the counter supplements with you to each visit. If your provider has ordered testing for you, please be sure to follow up with our office if you have not received results within 7 days after the testing took place. *If you receive a survey after visiting one of our offices, please take time to share your experience concerning your physician office visit. These surveys are confidential and no health information about you is shared. We are eager to improve for you and we are counting on your feedback to help make that happen. ips to Help You Stop Smoking       Cigarette smoking is a preventable cause of death in the [facility]. If you have thought about quitting but haven't been able to, here are some reasons why you should and some ways to do it. Here's Why   Quitting smoking now can decrease your risk of getting smoking-related illnesses like:   Heart disease   Stroke   Several types of cancer, including:   Lung   Mouth   Esophagus   Larynx   Bladder   Pancreas   Kidney   Chronic lung diseases:   Bronchitis   Emphysema   Asthma   Cataracts   Macular degeneration   Thyroid conditions   Hearing loss   Erectile dysfunction   Dementia   Osteoporosis   Here's How   Once you've decided to quit smoking, set your target quit date a few weeks away.  In the time leading up to your quit day, try some of these ideas offered by the [address] [place] [address] of the [employer] to help you successfully quit smoking. For the best results, work with your doctor. Together, you can test your lung function and compare the results to those of a nonsmoking person. The results can be given to you as your lung age. Finding out your lung age right after having the test done may help you to stop smoking. Your doctor can also discuss with you all of your options and refer you to smoking-cessation support groups. You may wish to use nicotine replacement (gum, patches, inhaler) or one of the prescription medications that have been shown to increase quit rates and prolong abstinence from smoking. But whatever you and your doctor decide on these matters, it will still be you who decides when an how to quit. Here are some techniques:   Switch Brands   Switch to a brand you find distasteful. Change to a brand that is low in tar and nicotine a couple of weeks before your target quit date. This will help change your smoking behavior. However, do not smoke more cigarettes, inhale them more often or more deeply, or place your fingertips over the holes in the filters. All of these actions will increase your nicotine intake, and the idea is to get your body used to functioning without nicotine. Cut Down the Number of Cigarettes You Smoke   Smoke only half of each cigarette. Each day, postpone the lighting of your first cigarette by one hour. Decide you'll only smoke during odd or even hours of the day. Decide beforehand how many cigarettes you'll smoke during the day. For each additional cigarette, give a dollar to your favorite lj. Change your eating habits to help you cut down. For example, drink milk, which many people consider incompatible with smoking. End meals or snacks with something that won't lead to a cigarette. Reach for a glass of juice instead of a cigarette for a \"pick-me-up. \"   Remember: Cutting down can help you quit, but it's not a substitute for quitting.  If you're down to about seven cigarettes a day, it's time to set your target quit date, and get ready to stick to it. Don't Smoke \"Automatically\"   Smoke only those cigarettes you really want. Catch yourself before you light up a cigarette out of pure habit. Don't empty your ashtrays. This will remind you of how many cigarettes you've smoked each day, and the sight and the smell of stale cigarettes butts will be very unpleasant. Make yourself aware of each cigarette by using the opposite hand or putting cigarettes in an unfamiliar location or a different pocket to break the automatic reach. If you light up many times during the day without even thinking about it, try to look in a mirror each time you put a match to your cigarette. You may decide you don't need it. Make Smoking Inconvenient   Stop buying cigarettes by the carton. Wait until one pack is empty before you buy another. Stop carrying cigarettes with you at home or at work. Make them difficult to get to. Make Smoking Unpleasant   Smoke only under circumstances that aren't especially pleasurable for you. If you like to smoke with others, smoke alone. Turn your chair to an empty corner and focus only on the cigarette you are smoking and all its many negative effects. Collect all your cigarette butts in one large glass container as a visual reminder of the filth made by smoking. Just Before Quitting   Practice going without cigarettes. Don't think of never smoking again. Think of quitting in terms of one day at a time . Tell yourself you won't smoke today, and then don't. Clean your clothes to rid them of the cigarette smell, which can linger a long time. On the Day You Quit   Throw away all your cigarettes and matches. Hide your lighters and ashtrays. Visit the dentist and have your teeth cleaned to get rid of tobacco stains. Notice how nice they look and resolve to keep them that way.    Make a list of things you'd like to buy for yourself or someone else. Estimate the cost in terms of packs of cigarettes, and put the money aside to buy these presents. Keep very busy on the big day. Go to the movies, exercise, take long walks, or go bike riding. Remind your family and friends that this is your quit date, and ask them to help you over the rough spots of the first couple of days and weeks. Buy yourself a treat or do something special to celebrate. Telephone and Internet Support   Telephone, web-, and computer-based programs can offer you the support that you need to quit and to stay smoke-free. You can find many programs online, like the [employer] Association's Andreas from Smoking . Immediately After Quitting   Develop a clean, fresh, nonsmoking environment around yourselfat work and at home. Buy yourself flowersyou may be surprised how much you can enjoy their scent now. The first few days after you quit, spend as much free time as possible in places where smoking isn't allowed, such as [address] [place] [address], museums, theaters, department stores, and churches. Drink large quantities of water and fruit juice (but avoid sodas that contain caffeine). Try to avoid alcohol, coffee, and other beverages that you associate with cigarette smoking. Strike up conversation instead of a match for a cigarette. If you miss the sensation of having a cigarette in your hand, play with something elsea pencil, a paper clip, a marble. If you miss having something in your mouth, try toothpicks or a fake cigarette.

## 2022-09-13 ENCOUNTER — TELEPHONE (OUTPATIENT)
Dept: PRIMARY CARE CLINIC | Age: 82
End: 2022-09-13

## 2022-09-13 NOTE — TELEPHONE ENCOUNTER
Patient came to the office today for more samples of Junuvia 10. This was prescribed by Sharan Mercado. Patient was given 2 box/boxes. Qty. 28, Lot # [id number], Exp. Date 1/25.

## 2022-09-19 ENCOUNTER — OFFICE VISIT (OUTPATIENT)
Dept: PRIMARY CARE CLINIC | Age: 82
End: 2022-09-19
Payer: MEDICARE

## 2022-09-19 VITALS
HEART RATE: 76 BPM | BODY MASS INDEX: 24.86 KG/M2 | OXYGEN SATURATION: 99 % | WEIGHT: 164 LBS | HEIGHT: 68 IN | DIASTOLIC BLOOD PRESSURE: 60 MMHG | TEMPERATURE: 97.8 F | SYSTOLIC BLOOD PRESSURE: 120 MMHG
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
## 2022-09-19 DIAGNOSIS — I10 BENIGN ESSENTIAL HTN: ICD-10-CM

## 2022-09-19 DIAGNOSIS — F03.918 DEMENTIA WITH OTHER BEHAVIORAL DISTURBANCE, UNSPECIFIED DEMENTIA SEVERITY, UNSPECIFIED DEMENTIA TYPE: Primary | ICD-10-CM

## 2022-09-19 DIAGNOSIS — R53.1 GENERALIZED WEAKNESS: ICD-10-CM

## 2022-09-19 DIAGNOSIS — R53.81 DECLINING FUNCTIONAL STATUS: ICD-10-CM

## 2022-09-19 PROCEDURE — 4004F PT TOBACCO SCREEN RCVD TLK: CPT | Performed by: NURSE PRACTITIONER

## 2022-09-19 PROCEDURE — G8420 CALC BMI NORM PARAMETERS: HCPCS | Performed by: NURSE PRACTITIONER

## 2022-09-19 PROCEDURE — G8427 DOCREV CUR MEDS BY ELIG CLIN: HCPCS | Performed by: NURSE PRACTITIONER

## 2022-09-19 PROCEDURE — 99213 OFFICE O/P EST LOW 20 MIN: CPT | Performed by: NURSE PRACTITIONER

## 2022-09-19 PROCEDURE — 1123F ACP DISCUSS/DSCN MKR DOCD: CPT | Performed by: NURSE PRACTITIONER

## 2022-09-19 RX ORDER — LOSARTAN POTASSIUM 100 MG/1
TABLET ORAL
COMMUNITY
Start: 2022-08-25 | End: 2022-10-13

## 2022-09-19 NOTE — PROGRESS NOTES
SUBJECTIVE:    Patient ID: Mushtaq Curtis is a 81 y.o.male. Chief Complaint   Patient presents with    Hypotension    Diabetes         HPI:    Pt presents to clinic with wife and daughter for follow up on his dementia, fall, hypotension and declining functional status. Seen by PCP 8/15/22 where family took his vehicle keys because they felt it unsafe for him to drive due to dementia, falls, declining functional status. Has had home health/PT. Reports now Released from PT. Still seeing speech. Patient states he is Wanting to drive again. [place] to drive there. Family reports they would be comfortable with that. Pt is Walking more and Uses cane. No more falls. Family reports he has improved. Patient eating and drinking. No n/v/d, abdominal pain, syncope, worsening mental status, fevers, CP, SOB. BP WNL. No hypoglycemic episodes. Glucose stable. Taking all home meds as directed, no issues. Patient's medications, allergies, past medical, surgical, social and family histories were reviewed and updated as appropriate in electronic medical record. Outpatient Medications Marked as Taking for the 9/19/22 encounter (Office Visit) with Rosalyn Collet, APRN - CNP   Medication Sig Dispense Refill    SITagliptin (JANUVIA) 100 MG tablet Take 1 tablet by mouth daily 28 tablet 0    hydrALAZINE (APRESOLINE) 25 MG tablet Take 1 tablet by mouth in the morning and 1 tablet at noon and 1 tablet before bedtime. 90 tablet 3    losartan (COZAAR) 50 MG tablet Take 1 tablet by mouth in the morning.  30 tablet 3    donepezil (ARICEPT) 5 MG tablet Take 1 tablet by mouth nightly 30 tablet 3    vitamin D (ERGOCALCIFEROL) 1.25 MG (38059 UT) CAPS capsule Take 1 capsule by mouth once a week 8 capsule 0    QUEtiapine (SEROQUEL) 25 MG tablet Take 1 tablet by mouth at bedtime 30 tablet 2    tamsulosin (FLOMAX) 0.4 mg capsule TAKE 1 CAPSULE BY MOUTH DAILY 30 capsule 3    metoprolol tartrate (LOPRESSOR) 25 MG tablet Take 0.5 tablets by mouth 2 times daily 60 tablet 3    Multiple Vitamins-Minerals (THERAPEUTIC MULTIVITAMIN-MINERALS) tablet Take 1 tablet by mouth daily 30 tablet 0    aspirin 81 MG chewable tablet Take 1 tablet by mouth daily 30 tablet 3    rosuvastatin (CRESTOR) 20 MG tablet Take 20 mg by mouth daily      omeprazole (PRILOSEC) 20 MG delayed release capsule Take 20 mg by mouth daily      cilostazol (PLETAL) 100 MG tablet Take 1 tablet by mouth 2 times daily. 60 tablet 6        Review of Systems   Constitutional: Negative. HENT: Negative. Respiratory: Negative. Cardiovascular: Negative. Gastrointestinal: Negative. Musculoskeletal:  Positive for arthralgias. Ambulates with cane   Neurological: Negative. Psychiatric/Behavioral:  The patient is not nervous/anxious.          Dementia     Past Medical History:   Diagnosis Date    Cataract     DM (diabetes mellitus) ([facility] [address]     Erectile dysfunction     Hard of hearing     hearing aid    High cholesterol     Hypertension     Hypothyroidism     Peripheral vascular disease ([facility] [address]     Pneumonia     Stroke [facility])      Past Surgical History:   Procedure Laterality Date    CYST REMOVAL      under right arm     EYE SURGERY  01/2012    cataract    FOOT SURGERY      growth removed; left     Family History   Problem Relation Age of Onset    Stroke Mother     Heart Disease Daughter         MI    Stroke Daughter       Social History     Tobacco Use   Smoking Status Every Day    Packs/day: 1.00    Years: 53.00    Pack years: 53.00    Types: Cigarettes   Smokeless Tobacco Never       OBJECTIVE:   Wt Readings from Last 3 Encounters:   09/19/22 164 lb (74.4 kg)   08/15/22 154 lb 3.2 oz (69.9 kg)   07/29/22 157 lb (71.2 kg)     BP Readings from Last 3 Encounters:   09/19/22 120/60   08/15/22 98/60   08/03/22 119/61       /60   Pulse 76   Temp 97.8 °F (36.6 °C)   Ht 5' 8\" (1.727 m)   Wt 164 lb (74.4 kg)   SpO2 99%   BMI 24.94 kg/m² Physical Exam  Vitals and nursing note reviewed. Constitutional:       General: He is not in acute distress. Appearance: Normal appearance. Comments: Elderly pleasant male   HENT:      Head: Normocephalic. Nose: Nose normal.   Eyes:      Conjunctiva/sclera: Conjunctivae normal.   Cardiovascular:      Rate and Rhythm: Normal rate and regular rhythm. Pulmonary:      Effort: Pulmonary effort is normal.      Breath sounds: Normal breath sounds. Abdominal:      Tenderness: There is no guarding. Musculoskeletal:         General: No swelling. Normal range of motion. Cervical back: Normal range of motion. Comments: Ambulates with cane. Skin:     General: Skin is warm. Capillary Refill: Capillary refill takes less than 2 seconds. Neurological:      General: No focal deficit present. Mental Status: He is alert and oriented to person, place, and time. Coordination: Coordination normal.      Gait: Gait normal.      Comments: Follows commands. Answers questions appropriately. Slight confused conversation at times. Generalized weakness. No unilateral weakness. Psychiatric:         Mood and Affect: Mood normal.         Behavior: Behavior normal.         Thought Content:  Thought content normal.       Lab Results   Component Value Date/Time     08/11/2022 11:11 AM    K 3.9 08/11/2022 11:11 AM    K 4.0 08/03/2022 07:44 AM     08/11/2022 11:11 AM    CO2 23 08/11/2022 11:11 AM    GLUCOSE 123 08/11/2022 11:11 AM    BUN 20 08/11/2022 11:11 AM    CREATININE 1.7 08/11/2022 11:11 AM    CALCIUM 11.4 08/11/2022 11:11 AM    PROT 6.3 08/11/2022 11:11 AM    LABALBU 4.2 08/11/2022 11:11 AM    BILITOT 0.9 08/11/2022 11:11 AM    ALT 12 08/11/2022 11:11 AM    AST 13 08/11/2022 11:11 AM       Hemoglobin A1C (%)   Date Value   04/04/2022 7.9 (H)     Microscopic Examination (no units)   Date Value   07/29/2022 YES     LDL Calculated (mg/dL)   Date Value   08/11/2022 70         Lab Results   Component Value Date/Time    WBC 7.2 08/11/2022 11:11 AM    NEUTROABS 4.2 08/01/2022 05:45 AM    HGB 14.3 08/11/2022 11:11 AM    HCT 43.9 08/11/2022 11:11 AM    MCV 89.2 08/11/2022 11:11 AM     08/11/2022 11:11 AM       Lab Results   Component Value Date    TSH 0.85 08/11/2022         ASSESSMENT/PLAN:     Family present and report overall he has improved and they feel safer with him driving now. I advised to be in vehicle with him when driving and agreeable. Discussed safety precautions. Discuss further with PCP if warranted. If family notice any decline, notify PCP. Family verbalized understanding. 1. Dementia with behavioral disturbance, unspecified dementia type ([address]  Stable. Continue home regimens. Has family support and present during visit. If worsening or concerns, RTC. F/u as scheduled. 2. Generalized weakness  Family report improving. Home health. Completed PT. Ambulating with cane. Continue adequate diet, hydration, activity as tolerated. If worsening, RTC. F/u as scheduled. 3. Fall, sequela  No further falls. Completed PT. Assistive devices. Fall precautions. See above. 4. Declining functional status  Manage all aspects of care from chronic to acute. Home health/PT followed for improving overall strength and endurance. Encouraged adequate nutrition. See above. F/u as scheduled. Per last pcp note, consider nursing home placement if further decline. 5. Benign essential HTN  Stable. No further hypotension. Continue current regimen. Diet. F/u as scheduled. No orders of the defined types were placed in this encounter.

## 2022-10-03 DIAGNOSIS — F91.9 BEHAVIOR DISTURBANCE: ICD-10-CM

## 2022-10-03 RX ORDER — QUETIAPINE FUMARATE 25 MG/1
25 TABLET, FILM COATED ORAL NIGHTLY
Qty: 30 TABLET | Refills: 2 | Status: SHIPPED | OUTPATIENT
Start: 2022-10-03

## 2022-10-08 ENCOUNTER — TELEPHONE (OUTPATIENT)
Dept: PRIMARY CARE CLINIC | Age: 82
End: 2022-10-08

## 2022-10-08 NOTE — TELEPHONE ENCOUNTER
Patient came to the office today for more samples of JANUVIA 100 mg. This was prescribed by verona Evans. Patient was given 4 box/boxes. Qty. 56, Lot # [id number], Exp. Date 1/25.

## 2022-10-11 ASSESSMENT — ENCOUNTER SYMPTOMS
GASTROINTESTINAL NEGATIVE: 1
RESPIRATORY NEGATIVE: 1

## 2022-10-13 ENCOUNTER — HOSPITAL ENCOUNTER (EMERGENCY)
Facility: HOSPITAL | Age: 82
Discharge: HOME | End: 2022-10-13
Attending: EMERGENCY MEDICINE
Payer: MEDICARE

## 2022-10-13 ENCOUNTER — APPOINTMENT (OUTPATIENT)
Dept: CT IMAGING | Facility: HOSPITAL | Age: 82
End: 2022-10-13
Payer: MEDICARE

## 2022-10-13 VITALS
DIASTOLIC BLOOD PRESSURE: 73 MMHG | SYSTOLIC BLOOD PRESSURE: 145 MMHG | HEART RATE: 69 BPM | OXYGEN SATURATION: 96 % | TEMPERATURE: 97.7 F | RESPIRATION RATE: 18 BRPM

## 2022-10-13 DIAGNOSIS — Z72.0 TOBACCO USE: ICD-10-CM

## 2022-10-13 DIAGNOSIS — R04.2 HEMOPTYSIS: Primary | ICD-10-CM

## 2022-10-13 DIAGNOSIS — I25.10 CALCIFICATION OF CORONARY ARTERY: ICD-10-CM

## 2022-10-13 DIAGNOSIS — E83.52 HYPERCALCEMIA: ICD-10-CM

## 2022-10-13 DIAGNOSIS — I25.84 CALCIFICATION OF CORONARY ARTERY: ICD-10-CM

## 2022-10-13 DIAGNOSIS — I31.39 PERICARDIAL EFFUSION: ICD-10-CM

## 2022-10-13 LAB
A/G RATIO: 1.9 (ref 0.8–2)
ALBUMIN SERPL-MCNC: 4.1 G/DL (ref 3.4–4.8)
ALP BLD-CCNC: 65 U/L (ref 25–100)
ALT SERPL-CCNC: 8 U/L (ref 4–36)
ANION GAP SERPL CALCULATED.3IONS-SCNC: 10 MMOL/L (ref 3–16)
AST SERPL-CCNC: 9 U/L (ref 8–33)
BASOPHILS ABSOLUTE: 0.1 K/UL (ref 0–0.1)
BASOPHILS RELATIVE PERCENT: 0.7 %
BILIRUB SERPL-MCNC: 0.5 MG/DL (ref 0.3–1.2)
BUN BLDV-MCNC: 16 MG/DL (ref 6–20)
CALCIUM SERPL-MCNC: 11 MG/DL (ref 8.5–10.5)
CHLORIDE BLD-SCNC: 103 MMOL/L (ref 98–107)
CO2: 24 MMOL/L (ref 20–30)
CREAT SERPL-MCNC: 1.4 MG/DL (ref 0.4–1.2)
EOSINOPHILS ABSOLUTE: 0.2 K/UL (ref 0–0.4)
EOSINOPHILS RELATIVE PERCENT: 3 %
GFR AFRICAN AMERICAN: 59
GFR NON-AFRICAN AMERICAN: 49
GLOBULIN: 2.2 G/DL
GLUCOSE BLD-MCNC: 140 MG/DL (ref 74–106)
HCT VFR BLD CALC: 42.1 % (ref 40–54)
HEMOGLOBIN: 13.9 G/DL (ref 13–18)
IMMATURE GRANULOCYTES #: 0 K/UL
IMMATURE GRANULOCYTES %: 0.3 % (ref 0–5)
LYMPHOCYTES ABSOLUTE: 1.6 K/UL (ref 1.5–4)
LYMPHOCYTES RELATIVE PERCENT: 24.3 %
MCH RBC QN AUTO: 30.3 PG (ref 27–32)
MCHC RBC AUTO-ENTMCNC: 33 G/DL (ref 31–35)
MCV RBC AUTO: 91.9 FL (ref 80–100)
MONOCYTES ABSOLUTE: 0.6 K/UL (ref 0.2–0.8)
MONOCYTES RELATIVE PERCENT: 9.1 %
NEUTROPHILS ABSOLUTE: 4.2 K/UL (ref 2–7.5)
NEUTROPHILS RELATIVE PERCENT: 62.6 %
PDW BLD-RTO: 14.8 % (ref 11–16)
PLATELET # BLD: 198 K/UL (ref 150–400)
PMV BLD AUTO: 9.5 FL (ref 6–10)
POTASSIUM SERPL-SCNC: 3.9 MMOL/L (ref 3.4–5.1)
RBC # BLD: 4.58 M/UL (ref 4.5–6)
SODIUM BLD-SCNC: 137 MMOL/L (ref 136–145)
TOTAL PROTEIN: 6.3 G/DL (ref 6.4–8.3)
WBC # BLD: 6.7 K/UL (ref 4–11)

## 2022-10-13 PROCEDURE — 80053 COMPREHEN METABOLIC PANEL: CPT

## 2022-10-13 PROCEDURE — 6360000004 HC RX CONTRAST MEDICATION: Performed by: EMERGENCY MEDICINE

## 2022-10-13 PROCEDURE — 85025 COMPLETE CBC W/AUTO DIFF WBC: CPT

## 2022-10-13 PROCEDURE — 36415 COLL VENOUS BLD VENIPUNCTURE: CPT

## 2022-10-13 PROCEDURE — 99285 EMERGENCY DEPT VISIT HI MDM: CPT

## 2022-10-13 PROCEDURE — 71260 CT THORAX DX C+: CPT

## 2022-10-13 RX ORDER — LOSARTAN POTASSIUM 50 MG/1
50 TABLET ORAL DAILY
COMMUNITY

## 2022-10-13 RX ADMIN — IOPAMIDOL 100 ML: 755 INJECTION, SOLUTION INTRAVENOUS at 10:38

## 2022-10-13 ASSESSMENT — LIFESTYLE VARIABLES
HOW MANY STANDARD DRINKS CONTAINING ALCOHOL DO YOU HAVE ON A TYPICAL DAY: PATIENT DOES NOT DRINK
HOW OFTEN DO YOU HAVE A DRINK CONTAINING ALCOHOL: NEVER

## 2022-10-13 ASSESSMENT — PAIN - FUNCTIONAL ASSESSMENT: PAIN_FUNCTIONAL_ASSESSMENT: NONE - DENIES PAIN

## 2022-10-13 NOTE — ED NOTES
Discharge instructions reviewed with patient, wife and daughter. All verbalize understanding. IV removed without complication. Patient has phone number for pulmonology in hand. Patient discharged home via POV with family.       Rock Allred RN  10/13/22 [id number]

## 2022-10-13 NOTE — ED PROVIDER NOTES
CHIEF COMPLAINT  Cough and Hemoptysis      HISTORY OF PRESENT ILLNESS  Milli Freed is a 81 y.o. male with a history of tobacco abuse, hypertension, hypothyroidism, and stroke who presents to the ED complaining of cough and hemoptysis. Patient reportedly has had an occasional cough with bloody sputum over the last 2 to 3 days. Patient denies fevers, chills, or sweats. No epistaxis reported. Patient is not on blood thinners but does take aspirin. No additional complaints. .   No other complaints, modifying factors or associated symptoms. I have reviewed the following from the nursing documentation.     Past Medical History:   Diagnosis Date    Cataract     DM (diabetes mellitus) ([facility]     Erectile dysfunction     Hard of hearing     hearing aid    High cholesterol     Hypertension     Hypothyroidism     Peripheral vascular disease ([facility] [address]     Pneumonia     Stroke [facility])      Past Surgical History:   Procedure Laterality Date    CYST REMOVAL      under right arm     EYE SURGERY  01/2012    cataract    FOOT SURGERY      growth removed; left     Family History   Problem Relation Age of Onset    Stroke Mother     Heart Disease Daughter         MI    Stroke Daughter      Social History     Socioeconomic History    Marital status:      Spouse name: Not on file    Number of children: Not on file    Years of education: Not on file    Highest education level: Not on file   Occupational History    Not on file   Tobacco Use    Smoking status: Every Day     Packs/day: 1.00     Years: 53.00     Pack years: 53.00     Types: Cigarettes    Smokeless tobacco: Never   Substance and Sexual Activity    Alcohol use: No    Drug use: No    Sexual activity: Not on file   Other Topics Concern    Not on file   Social History Narrative    Not on file     Social Determinants of Health     Financial Resource Strain: Low Risk     Difficulty of Paying Living Expenses: Not hard at all   Food Insecurity: No Food Insecurity Worried About Running Out of Food in the Last Year: Never true    Ran Out of Food in the Last Year: Never true   Transportation Needs: Not on file   Physical Activity: Not on file   Stress: Not on file   Social Connections: Not on file   Intimate Partner Violence: Not on file   Housing Stability: Not on file     No current facility-administered medications for this encounter. Current Outpatient Medications   Medication Sig Dispense Refill    losartan (COZAAR) 50 MG tablet Take 50 mg by mouth daily      QUEtiapine (SEROQUEL) 25 MG tablet TAKE 1 TABLET BY MOUTH AT BEDTIME 30 tablet 2    SITagliptin (JANUVIA) 100 MG tablet Take 1 tablet by mouth daily 28 tablet 0    hydrALAZINE (APRESOLINE) 25 MG tablet Take 1 tablet by mouth in the morning and 1 tablet at noon and 1 tablet before bedtime. 90 tablet 3    donepezil (ARICEPT) 5 MG tablet Take 1 tablet by mouth nightly (Patient taking differently: Take 5 mg by mouth daily) 30 tablet 3    vitamin D (ERGOCALCIFEROL) 1.25 MG (98540 UT) CAPS capsule Take 1 capsule by mouth once a week 8 capsule 0    tamsulosin (FLOMAX) 0.4 mg capsule TAKE 1 CAPSULE BY MOUTH DAILY 30 capsule 3    metoprolol tartrate (LOPRESSOR) 25 MG tablet Take 0.5 tablets by mouth 2 times daily 60 tablet 3    Multiple Vitamins-Minerals (THERAPEUTIC MULTIVITAMIN-MINERALS) tablet Take 1 tablet by mouth daily 30 tablet 0    aspirin 81 MG chewable tablet Take 1 tablet by mouth daily 30 tablet 3    SITagliptin (JANUVIA) 50 MG tablet Take 2 tablets by mouth daily 60 tablet 0    rosuvastatin (CRESTOR) 20 MG tablet Take 20 mg by mouth daily      omeprazole (PRILOSEC) 20 MG delayed release capsule Take 20 mg by mouth daily      cilostazol (PLETAL) 100 MG tablet Take 1 tablet by mouth 2 times daily. 60 tablet 6     No Known Allergies    REVIEW OF SYSTEMS  10 systems reviewed, pertinent positives per HPI otherwise noted to be negative.     PHYSICAL EXAM  /77   Pulse 69   Temp 97.7 °F (36.5 °C) (Oral)   Resp 18   SpO2 97%   GENERAL APPEARANCE: Awake and alert. Cooperative. No acute distress. HEAD: Normocephalic. Atraumatic. EYES: PERRL. EOM's grossly intact. ENT: Mucous membranes are moist.  No evidence of epistaxis. NECK: Supple, trachea midline. HEART: RRR. Normal S1, S2. No murmurs, rubs or gallops. LUNGS: Respirations unlabored. CTAB. Good air exchange. No wheezes, rales, or rhonchi. Speaking comfortably in full sentences. ABDOMEN: Soft. Non-distended. Non-tender. No guarding or rebound. Normal Bowel sounds. EXTREMITIES: No peripheral edema. MAEE. No acute deformities. SKIN: Warm and dry. No acute rashes. NEUROLOGICAL: Alert and oriented X 3. CN II-XII intact. No gross facial drooping. Strength 5/5 in all extremities. Sensation intact. No pronator drift. Normal coordination. Gait normal.   PSYCHIATRIC: Normal mood and affect. LABS  I have reviewed all labs for this visit.    Results for orders placed or performed during the hospital encounter of 10/13/22   CBC with Auto Differential   Result Value Ref Range    WBC 6.7 4.0 - 11.0 K/uL    RBC 4.58 4.50 - 6.00 M/uL    Hemoglobin 13.9 13.0 - 18.0 g/dL    Hematocrit 42.1 40.0 - 54.0 %    MCV 91.9 80.0 - 100.0 fL    MCH 30.3 27.0 - 32.0 pg    MCHC 33.0 31.0 - 35.0 g/dL    RDW 14.8 11.0 - 16.0 %    Platelets 164 251 - 859 K/uL    MPV 9.5 6.0 - 10.0 fL    Neutrophils % 62.6 %    Immature Granulocytes % 0.3 0.0 - 5.0 %    Lymphocytes % 24.3 %    Monocytes % 9.1 %    Eosinophils % 3.0 %    Basophils % 0.7 %    Neutrophils Absolute 4.2 2.0 - 7.5 K/uL    Immature Granulocytes # 0.0 K/uL    Lymphocytes Absolute 1.6 1.5 - 4.0 K/uL    Monocytes Absolute 0.6 0.2 - 0.8 K/uL    Eosinophils Absolute 0.2 0.0 - 0.4 K/uL    Basophils Absolute 0.1 0.0 - 0.1 K/uL   Comprehensive Metabolic Panel   Result Value Ref Range    Sodium 137 136 - 145 mmol/L    Potassium 3.9 3.4 - 5.1 mmol/L    Chloride 103 98 - 107 mmol/L    CO2 24 20 - 30 mmol/L    Anion Gap 10 3 - 16 Glucose 140 (H) 74 - 106 mg/dL    BUN 16 6 - 20 mg/dL    Creatinine 1.4 (H) 0.4 - 1.2 mg/dL    GFR Non- 49 (L) >59    GFR  59 (L) >59    Calcium 11.0 (H) 8.5 - 10.5 mg/dL    Total Protein 6.3 (L) 6.4 - 8.3 g/dL    Albumin 4.1 3.4 - 4.8 g/dL    Albumin/Globulin Ratio 1.9 0.8 - 2.0    Total Bilirubin 0.5 0.3 - 1.2 mg/dL    Alkaline Phosphatase 65 25 - 100 U/L    ALT 8 4 - 36 U/L    AST 9 8 - 33 U/L    Globulin 2.2 Not Established g/dL                 RADIOLOGY  X-RAYS:  I have reviewed radiologic plain film image(s). ALL OTHER NON-PLAIN FILM IMAGES SUCH AS CT, ULTRASOUND AND MRI HAVE BEEN READ BY THE RADIOLOGIST. CT CHEST W CONTRAST   Final Result      1. Small left greater than right pleural effusions with associated mild bilateral dependent atelectasis. No airspace consolidation. 2.  Small pericardial effusion. 3.  Mild ectasia of the ascending thoracic aorta measuring 4 cm. 4.  Extensive coronary artery calcifications. Rechecks: Physical assessment performed. Patient stable throughout her stay in the emergency department. No evidence of recurrent hemoptysis/coughing throughout his stay. ED COURSE/MDM  Patient seen and evaluated. Old records reviewed. Labs and imaging reviewed and results discussed with patient. Patient was stable throughout his stay. Patient remained asymptomatic. Patient's labs reveal hypercalcemia. Imaging shows pleural and pericardial effusions which are small in nature and patient remains asymptomatic. Close outpatient management is recommended at this time. Patient was provided follow-up with his PCP as well as the nurse practitioner for pulmonology. . Patient was reassessed as noted above . No acute pathology was noted and pt is safe for discharge home to follow up as recommended. . Plan of care discussed with patient and family. Patient and family in agreement with plan.      Patient was given scripts for the following medications. I counseled patient how to take these medications. New Prescriptions    No medications on file       CLINICAL IMPRESSION  1. Hemoptysis    2. Tobacco use    3. Pericardial effusion    4. Calcification of coronary artery    5. Hypercalcemia        Blood pressure 126/77, pulse 69, temperature 97.7 °F (36.5 °C), temperature source Oral, resp. rate 18, SpO2 97 %. Alisha Avelar was discharged to home in stable condition.          René Malone, DO  10/13/22 1123

## 2022-10-13 NOTE — ED TRIAGE NOTES
Patient presents today at the request of his PCP. His daughter reports that she called the patient's PCP yesterday to discuss the patient condition. He had a productive cough, bloody at times, for 3 days. The patient denies fever, weakness, dizziness or loss of appetite. He reports that he is still coughing, but has not seen any blood since yesterday morning. The PCP recommended the patient come to the ED for possible imaging.

## 2022-10-17 ENCOUNTER — OFFICE VISIT (OUTPATIENT)
Dept: PRIMARY CARE CLINIC | Age: 82
End: 2022-10-17
Payer: MEDICARE

## 2022-10-17 ENCOUNTER — HOSPITAL ENCOUNTER (OUTPATIENT)
Facility: HOSPITAL | Age: 82
Discharge: HOME | End: 2022-10-17
Payer: MEDICARE

## 2022-10-17 VITALS
BODY MASS INDEX: 25.77 KG/M2 | TEMPERATURE: 97.5 F | HEART RATE: 69 BPM | OXYGEN SATURATION: 99 % | DIASTOLIC BLOOD PRESSURE: 59 MMHG | SYSTOLIC BLOOD PRESSURE: 121 MMHG | WEIGHT: 169.5 LBS

## 2022-10-17 DIAGNOSIS — E83.52 CALCIUM BLOOD INCREASED: ICD-10-CM

## 2022-10-17 DIAGNOSIS — J44.9 CHRONIC OBSTRUCTIVE PULMONARY DISEASE, UNSPECIFIED COPD TYPE (HCC): ICD-10-CM

## 2022-10-17 DIAGNOSIS — I31.39 PERICARDIAL EFFUSION: ICD-10-CM

## 2022-10-17 DIAGNOSIS — R04.2 COUGH WITH HEMOPTYSIS: Primary | ICD-10-CM

## 2022-10-17 LAB
A/G RATIO: 2.4 (ref 0.8–2)
ALBUMIN SERPL-MCNC: 4 G/DL (ref 3.4–4.8)
ALP BLD-CCNC: 69 U/L (ref 25–100)
ALT SERPL-CCNC: <5 U/L (ref 4–36)
ANION GAP SERPL CALCULATED.3IONS-SCNC: 11 MMOL/L (ref 3–16)
AST SERPL-CCNC: 9 U/L (ref 8–33)
BASOPHILS ABSOLUTE: 0.1 K/UL (ref 0–0.1)
BASOPHILS RELATIVE PERCENT: 0.7 %
BILIRUB SERPL-MCNC: 0.4 MG/DL (ref 0.3–1.2)
BUN BLDV-MCNC: 17 MG/DL (ref 6–20)
CALCIUM SERPL-MCNC: 10.7 MG/DL (ref 8.5–10.5)
CHLORIDE BLD-SCNC: 108 MMOL/L (ref 98–107)
CO2: 23 MMOL/L (ref 20–30)
CREAT SERPL-MCNC: 1.6 MG/DL (ref 0.4–1.2)
EOSINOPHILS ABSOLUTE: 0.1 K/UL (ref 0–0.4)
EOSINOPHILS RELATIVE PERCENT: 1.4 %
GFR AFRICAN AMERICAN: 50
GFR SERPL CREATININE-BSD FRML MDRD: 43 ML/MIN/{1.73_M2}
GLOBULIN: 1.7 G/DL
GLUCOSE BLD-MCNC: 134 MG/DL (ref 74–106)
HCT VFR BLD CALC: 41.6 % (ref 40–54)
HEMOGLOBIN: 13.6 G/DL (ref 13–18)
IMMATURE GRANULOCYTES #: 0 K/UL
IMMATURE GRANULOCYTES %: 0.3 % (ref 0–5)
LYMPHOCYTES ABSOLUTE: 1.7 K/UL (ref 1.5–4)
LYMPHOCYTES RELATIVE PERCENT: 18.5 %
MCH RBC QN AUTO: 30.2 PG (ref 27–32)
MCHC RBC AUTO-ENTMCNC: 32.7 G/DL (ref 31–35)
MCV RBC AUTO: 92.4 FL (ref 80–100)
MONOCYTES ABSOLUTE: 0.7 K/UL (ref 0.2–0.8)
MONOCYTES RELATIVE PERCENT: 7.6 %
NEUTROPHILS ABSOLUTE: 6.4 K/UL (ref 2–7.5)
NEUTROPHILS RELATIVE PERCENT: 71.5 %
PDW BLD-RTO: 14.7 % (ref 11–16)
PLATELET # BLD: 125 K/UL (ref 150–400)
PMV BLD AUTO: 11.3 FL (ref 6–10)
POTASSIUM SERPL-SCNC: 4.1 MMOL/L (ref 3.4–5.1)
RBC # BLD: 4.5 M/UL (ref 4.5–6)
SODIUM BLD-SCNC: 142 MMOL/L (ref 136–145)
TOTAL PROTEIN: 5.7 G/DL (ref 6.4–8.3)
WBC # BLD: 9 K/UL (ref 4–11)

## 2022-10-17 PROCEDURE — 4004F PT TOBACCO SCREEN RCVD TLK: CPT | Performed by: NURSE PRACTITIONER

## 2022-10-17 PROCEDURE — G8427 DOCREV CUR MEDS BY ELIG CLIN: HCPCS | Performed by: NURSE PRACTITIONER

## 2022-10-17 PROCEDURE — 1123F ACP DISCUSS/DSCN MKR DOCD: CPT | Performed by: NURSE PRACTITIONER

## 2022-10-17 PROCEDURE — 80053 COMPREHEN METABOLIC PANEL: CPT

## 2022-10-17 PROCEDURE — 85025 COMPLETE CBC W/AUTO DIFF WBC: CPT

## 2022-10-17 PROCEDURE — 83970 ASSAY OF PARATHORMONE: CPT

## 2022-10-17 PROCEDURE — 99214 OFFICE O/P EST MOD 30 MIN: CPT | Performed by: NURSE PRACTITIONER

## 2022-10-17 PROCEDURE — 3074F SYST BP LT 130 MM HG: CPT | Performed by: NURSE PRACTITIONER

## 2022-10-17 PROCEDURE — 3078F DIAST BP <80 MM HG: CPT | Performed by: NURSE PRACTITIONER

## 2022-10-17 PROCEDURE — G8484 FLU IMMUNIZE NO ADMIN: HCPCS | Performed by: NURSE PRACTITIONER

## 2022-10-17 PROCEDURE — 3023F SPIROM DOC REV: CPT | Performed by: NURSE PRACTITIONER

## 2022-10-17 PROCEDURE — G8417 CALC BMI ABV UP PARAM F/U: HCPCS | Performed by: NURSE PRACTITIONER

## 2022-10-17 RX ORDER — FUROSEMIDE 20 MG/1
TABLET ORAL
Qty: 14 TABLET | Refills: 3 | Status: SHIPPED | OUTPATIENT
Start: 2022-10-17

## 2022-10-17 NOTE — PROGRESS NOTES
Chief Complaint   Patient presents with    Follow-Up from Jessa Valentin      ER       Have you seen any other physician or provider since your last visit yes -  ER    Have you had any other diagnostic tests since your last visit?  yes - CT Chest and labs    Have you changed or stopped any medications since your last visit? no

## 2022-10-17 NOTE — PROGRESS NOTES
Post-Discharge Transitional Care Follow Up      Dinora Pink   YOB: 1940    Date of Office Visit:  10/17/2022  Date of Hospital Admission: 10/13/22  Date of Hospital Discharge: 10/13/22  Readmission Risk Score(high >=14%. Medium >=10%):Readmission Risk Score: 18.6      Care management risk score Rising risk (score 2-5) and Complex Care (Scores >=6): No Risk Score On File     Non face to face  following discharge, date last encounter closed (first attempt may have been earlier): *No documented post hospital discharge outreach found in the last 14 days     Call initiated 2 business days of discharge: *No response recorded in the last 14 days     Below is the assessment and plan developed based on review of pertinent history, physical exam, labs, studies, and medications. Cough with hemoptysis  -     External Referral To Pulmonology  Chronic obstructive pulmonary disease, unspecified COPD type ([facility]  -     External Referral To Pulmonology  Pericardial effusion  -     Comprehensive Metabolic Panel; Future  -     PTH, Intact with Ionized Calcium; Future  Calcium blood increased  -     CBC with Auto Differential; Future    Medical Decision Making: high complexity  Return in about 2 weeks (around 10/31/2022). Subjective:   HPI  He presents for follow-up from his emergency room visit. He had gone to the ER because he had been spitting up blood. He was coughing and had blood. He is on aspirin not on blood thinners. He has been a longtime smoker. In the emergency room he was found to be stable labs did reveal some hypercalcemia and his daughter is with him today concerned about that. There was no treatment from the emergency room his images showed pericardial and pleural effusions. But they were small in nature and asymptomatic. He is supposed to get an appointment with pulmonology. He has not had any calcium supplements.   Since returning home he has not really been spitting up any blood or having having any other bleeding. His daughter says he has been stable no problems has not had any falls. Inpatient course: Discharge summary reviewed- see chart. Interval history/Current status: fair      Patient Active Problem List   Diagnosis    Other specified hypothyroidism    Diabetes mellitus, type II ([facility]    Hyperlipidemia    Benign essential HTN    Acute hypoxemic respiratory failure due to COVID-19 [facility])    Vitamin D deficiency    CKD (chronic kidney disease) stage 3, GFR 30-59 ml/min ([facility]    Acute kidney injury superimposed on CKD ([facility])    Thrombocytopenia ([facility])    Generalized weakness    Acute encephalopathy    Diarrhea    Hypokalemia    Speech problem    Peripheral vascular disease ([facility])    EKG, abnormal    History of stroke    Personal history of nicotine dependence    Falls    Pneumothorax on left    Closed fracture of multiple ribs of left side    Hypotension    Weight loss    Hypoalbuminemia    Spinal stenosis    Hypomagnesemia    Hypercalcemia    AMS (altered mental status)    Memory problem    Dementia with behavioral disturbance       Medications listed as started at the time of discharge from hospital  Cannot display discharge medications since this is not an admission. Medications marked \"taking\" at this time  Outpatient Medications Marked as Taking for the 10/17/22 encounter (Office Visit) with COURT Go[place]   Medication Sig Dispense Refill    furosemide (LASIX) 20 MG tablet One po daily for 2 days then a needed.  14 tablet 3    losartan (COZAAR) 50 MG tablet Take 50 mg by mouth daily      QUEtiapine (SEROQUEL) 25 MG tablet TAKE 1 TABLET BY MOUTH AT BEDTIME 30 tablet 2    SITagliptin (JANUVIA) 100 MG tablet Take 1 tablet by mouth daily 28 tablet 0    donepezil (ARICEPT) 5 MG tablet Take 1 tablet by mouth nightly (Patient taking differently: Take 5 mg by mouth daily) 30 tablet 3    vitamin D (ERGOCALCIFEROL) 1.25 MG (08913 UT) CAPS capsule Take 1 capsule by mouth once a week 8 capsule 0    [DISCONTINUED] hydrALAZINE (APRESOLINE) 25 MG tablet Take 1 tablet by mouth in the morning and 1 tablet at noon and 1 tablet before bedtime. 90 tablet 3    [DISCONTINUED] tamsulosin (FLOMAX) 0.4 mg capsule TAKE 1 CAPSULE BY MOUTH DAILY 30 capsule 3    metoprolol tartrate (LOPRESSOR) 25 MG tablet Take 0.5 tablets by mouth 2 times daily 60 tablet 3    Multiple Vitamins-Minerals (THERAPEUTIC MULTIVITAMIN-MINERALS) tablet Take 1 tablet by mouth daily 30 tablet 0    aspirin 81 MG chewable tablet Take 1 tablet by mouth daily 30 tablet 3    rosuvastatin (CRESTOR) 20 MG tablet Take 20 mg by mouth daily      omeprazole (PRILOSEC) 20 MG delayed release capsule Take 20 mg by mouth daily      cilostazol (PLETAL) 100 MG tablet Take 1 tablet by mouth 2 times daily. 60 tablet 6        Medications patient taking as of now reconciled against medications ordered at time of hospital discharge: Yes    Review of Systems   Constitutional:  Positive for fatigue. Respiratory:  Positive for cough. Objective:    Patient-Reported Vitals  No data recorded    Physical Exam  Vitals and nursing note reviewed. Constitutional:       Appearance: He is well-developed. HENT:      Head: Normocephalic and atraumatic. Eyes:      Conjunctiva/sclera: Conjunctivae normal.      Pupils: Pupils are equal, round, and reactive to light. Neck:      Thyroid: No thyromegaly. Vascular: No JVD. Cardiovascular:      Rate and Rhythm: Normal rate and regular rhythm. Heart sounds: No murmur heard. No friction rub. No gallop. Pulmonary:      Effort: Pulmonary effort is normal. No respiratory distress. Breath sounds: Decreased breath sounds present. No wheezing or rales. Abdominal:      General: Bowel sounds are normal. There is no distension. Palpations: Abdomen is soft. Tenderness: There is no abdominal tenderness. There is no guarding. Musculoskeletal:         General: No tenderness.       Cervical back: Normal range of motion and neck supple. Comments: Sitting in a wheelchair weak. Skin:     General: Skin is warm and dry. Findings: No rash. Neurological:      Mental Status: He is alert and oriented to person, place, and time.    Psychiatric:         Judgment: Judgment normal.     [INSTRUCTIONS:  \"[x]\" Indicates a positive item  \"[]\" Indicates a negative item  -- DELETE ALL ITEMS NOT EXAMINED]    Constitutional: [x] Appears well-developed and well-nourished [x] No apparent distress      [] Abnormal -     Mental status: [x] Alert and awake  [x] Oriented to person/place/time [x] Able to follow commands    [] Abnormal -     Eyes:   EOM    [x]  Normal    [] Abnormal -   Sclera  [x]  Normal    [] Abnormal -          Discharge [x]  None visible   [] Abnormal -     HENT: [x] Normocephalic, atraumatic  [] Abnormal -   [x] Mouth/Throat: Mucous membranes are moist    External Ears [x] Normal  [] Abnormal -    Neck: [x] No visualized mass [] Abnormal -     Pulmonary/Chest: [x] Respiratory effort normal   [x] No visualized signs of difficulty breathing or respiratory distress        [] Abnormal -      Musculoskeletal:   [x] Normal gait with no signs of ataxia         [x] Normal range of motion of neck        [] Abnormal -     Neurological:        [x] No Facial Asymmetry (Cranial nerve 7 motor function) (limited exam due to video visit)          [x] No gaze palsy        [] Abnormal -          Skin:        [x] No significant exanthematous lesions or discoloration noted on facial skin         [] Abnormal -            Psychiatric:       [x] Normal Affect [] Abnormal -        [x] No Hallucinations    Other pertinent observable physical exam findings:-

## 2022-10-18 DIAGNOSIS — N18.30 STAGE 3 CHRONIC KIDNEY DISEASE, UNSPECIFIED WHETHER STAGE 3A OR 3B CKD (HCC): ICD-10-CM

## 2022-10-18 LAB — PARATHYROID HORMONE INTACT: 50 PG/ML (ref 14–72)

## 2022-10-18 RX ORDER — TAMSULOSIN HYDROCHLORIDE 0.4 MG/1
0.4 CAPSULE ORAL DAILY
Qty: 30 CAPSULE | Refills: 3 | Status: SHIPPED | OUTPATIENT
Start: 2022-10-18

## 2022-11-01 ENCOUNTER — TELEPHONE (OUTPATIENT)
Dept: PRIMARY CARE CLINIC | Age: 82
End: 2022-11-01

## 2022-11-02 RX ORDER — HYDRALAZINE HYDROCHLORIDE 25 MG/1
25 TABLET, FILM COATED ORAL EVERY 8 HOURS SCHEDULED
Qty: 90 TABLET | Refills: 1 | Status: SHIPPED | OUTPATIENT
Start: 2022-11-02

## 2022-11-04 ENCOUNTER — OFFICE VISIT (OUTPATIENT)
Dept: NEUROLOGY | Facility: CLINIC | Age: 82
End: 2022-11-04

## 2022-11-04 VITALS
BODY MASS INDEX: 25.33 KG/M2 | HEIGHT: 69 IN | WEIGHT: 171 LBS | DIASTOLIC BLOOD PRESSURE: 70 MMHG | TEMPERATURE: 97.7 F | OXYGEN SATURATION: 99 % | HEART RATE: 63 BPM | SYSTOLIC BLOOD PRESSURE: 128 MMHG

## 2022-11-04 DIAGNOSIS — F03.90 DEMENTIA WITHOUT BEHAVIORAL DISTURBANCE: Primary | ICD-10-CM

## 2022-11-04 DIAGNOSIS — R27.8 OTHER LACK OF COORDINATION: ICD-10-CM

## 2022-11-04 PROCEDURE — 99204 OFFICE O/P NEW MOD 45 MIN: CPT | Performed by: NURSE PRACTITIONER

## 2022-11-04 RX ORDER — DONEPEZIL HYDROCHLORIDE 10 MG/1
10 TABLET, FILM COATED ORAL NIGHTLY
Qty: 90 TABLET | Refills: 3 | Status: SHIPPED | OUTPATIENT
Start: 2022-11-04 | End: 2023-11-04

## 2022-11-04 RX ORDER — SITAGLIPTIN 100 MG/1
TABLET, FILM COATED ORAL
COMMUNITY
Start: 2022-08-10

## 2022-11-04 RX ORDER — FUROSEMIDE 20 MG/1
TABLET ORAL DAILY
COMMUNITY
Start: 2022-11-02

## 2022-11-04 RX ORDER — CILOSTAZOL 100 MG/1
TABLET ORAL
COMMUNITY
Start: 2022-11-02

## 2022-11-04 RX ORDER — POTASSIUM CHLORIDE 20 MEQ/1
TABLET, EXTENDED RELEASE ORAL
COMMUNITY
Start: 2022-07-28

## 2022-11-04 RX ORDER — MULTIPLE VITAMINS W/ MINERALS TAB 9MG-400MCG
1 TAB ORAL DAILY
COMMUNITY
Start: 2022-06-05

## 2022-11-04 RX ORDER — TAMSULOSIN HYDROCHLORIDE 0.4 MG/1
CAPSULE ORAL
COMMUNITY
Start: 2022-10-18

## 2022-11-04 RX ORDER — OMEPRAZOLE 20 MG/1
CAPSULE, DELAYED RELEASE ORAL
COMMUNITY
Start: 2022-11-02

## 2022-11-04 RX ORDER — DONEPEZIL HYDROCHLORIDE 5 MG/1
TABLET, FILM COATED ORAL
COMMUNITY
Start: 2022-11-02 | End: 2022-11-04 | Stop reason: DRUGHIGH

## 2022-11-04 RX ORDER — LOSARTAN POTASSIUM 50 MG/1
TABLET ORAL
COMMUNITY
Start: 2022-11-02 | End: 2023-01-06 | Stop reason: DRUGHIGH

## 2022-11-04 RX ORDER — QUETIAPINE FUMARATE 25 MG/1
TABLET, FILM COATED ORAL
COMMUNITY
Start: 2022-11-01

## 2022-11-04 RX ORDER — ERGOCALCIFEROL 1.25 MG/1
1 CAPSULE ORAL WEEKLY
COMMUNITY
Start: 2022-08-03

## 2022-11-04 RX ORDER — ROSUVASTATIN CALCIUM 20 MG/1
TABLET, COATED ORAL
COMMUNITY
Start: 2022-11-02

## 2022-11-04 RX ORDER — HYDRALAZINE HYDROCHLORIDE 25 MG/1
TABLET, FILM COATED ORAL
COMMUNITY
Start: 2022-11-02

## 2022-11-04 NOTE — PROGRESS NOTES
New Neurology Patient Office Visit      Patient Name: Eduar Ashton    Referring Physician: Gabriela Blankenship AP*    Chief Complaint:    Chief Complaint   Patient presents with   • Advice Only     New patient in office to establish care for Cognitive and behavioral changes/ Late effect of lacunar infarction           History of Present Illness: Eduar Ashton is a 82 y.o. male who is here today to establish care with Neurology. Accompanied by his daughter Aziza.     'I forgot how to work some of the things on my car'    Daughter reports that patient has had 3 'mini strokes', also became worse after chela COVID.     Wife handles finances, this has always been their arrangement    Wife also prepares his medications    Still drives, but admits that he has difficulty operating car    Never has used computers, cell phone    Previously had trouble with aggression, this has improved significantly since starting Seroquel.  Daughter feels that he has improved significantly, in general, in the past 3 months.    He has been  63 years, he enjoys cooking, previously worked in construction    The following portions of the patient's history were reviewed and updated as appropriate: allergies, current medications, past family history, past medical history, past social history, past surgical history and problem list.    Subjective      Review of Systems:   Review of Systems   Neurological: Positive for memory problem and confusion.   Psychiatric/Behavioral: Negative for agitation, behavioral problems, hallucinations and sleep disturbance.   All other systems reviewed and are negative.    Past Medical History:   Past Medical History:   Diagnosis Date   • Diabetes mellitus, type 2 (HCC)        Past Surgical History:   Past Surgical History:   Procedure Laterality Date   • FOOT SURGERY         Family History:   Family History   Problem Relation Age of Onset   • Dementia Mother    • Stroke Mother   "      Social History:   Social History     Socioeconomic History   • Marital status:    Tobacco Use   • Smoking status: Every Day     Packs/day: 1.00     Types: Cigarettes   • Smokeless tobacco: Never   Substance and Sexual Activity   • Alcohol use: Not Currently       Medications:     Current Outpatient Medications:   •  cilostazol (PLETAL) 100 MG tablet, , Disp: , Rfl:   •  ergocalciferol (ERGOCALCIFEROL) 1.25 MG (87350 UT) capsule, Take 1 capsule by mouth 1 (One) Time Per Week., Disp: , Rfl:   •  furosemide (LASIX) 20 MG tablet, Daily., Disp: , Rfl:   •  hydrALAZINE (APRESOLINE) 25 MG tablet, , Disp: , Rfl:   •  Januvia 100 MG tablet, , Disp: , Rfl:   •  losartan (COZAAR) 50 MG tablet, , Disp: , Rfl:   •  metoprolol tartrate (LOPRESSOR) 25 MG tablet, , Disp: , Rfl:   •  multivitamin with minerals tablet tablet, Take 1 tablet by mouth Daily., Disp: , Rfl:   •  omeprazole (priLOSEC) 20 MG capsule, , Disp: , Rfl:   •  potassium chloride (K-DUR,KLOR-CON) 20 MEQ CR tablet, , Disp: , Rfl:   •  QUEtiapine (SEROquel) 25 MG tablet, , Disp: , Rfl:   •  rosuvastatin (CRESTOR) 20 MG tablet, , Disp: , Rfl:   •  tamsulosin (FLOMAX) 0.4 MG capsule 24 hr capsule, , Disp: , Rfl:   •  donepezil (Aricept) 10 MG tablet, Take 1 tablet by mouth Every Night., Disp: 90 tablet, Rfl: 3    Allergies:   No Known Allergies    Objective     Physical Exam:  Vital Signs:   Vitals:    11/04/22 1342   BP: 128/70   BP Location: Right arm   Patient Position: Sitting   Cuff Size: Adult   Pulse: 63   Temp: 97.7 °F (36.5 °C)   SpO2: 99%   Weight: 77.6 kg (171 lb)   Height: 175.3 cm (69\")     Physical Exam  Vitals and nursing note reviewed. Exam conducted with a chaperone present (daughter Aziza).   Neck:      Vascular: No carotid bruit.   Cardiovascular:      Rate and Rhythm: Regular rhythm.      Heart sounds: Normal heart sounds.   Pulmonary:      Effort: Pulmonary effort is normal.   Skin:     General: Skin is warm.   Neurological:      " Cranial Nerves: Cranial nerves 2-12 are intact.      Motor: Motor strength is normal.      Deep Tendon Reflexes:      Reflex Scores:       Patellar reflexes are 2+ on the right side and 2+ on the left side.  Psychiatric:         Mood and Affect: Mood normal.         Speech: Speech normal.         Behavior: Behavior normal.       Neurologic Exam     Mental Status   Oriented to person.   Oriented to place. Oriented to city.   Disoriented to year, month and date. Oriented to day and season.   Registration: recalls 3 of 3 objects. Recall at 5 minutes: recalls 2 of 3 objects. Follows 3 step commands.   Attention: normal. Concentration: normal.   Speech: speech is normal   Level of consciousness: alert  Unable to perform simple calculations: 0/5.   Able to name object. Able to read. Unable to repeat. Unable to write. Abnormal comprehension.     Cranial Nerves   Cranial nerves II through XII intact.     CN II   Visual acuity: normal with correction    CN VIII   Hearing: impaired    Motor Exam   Muscle bulk: normal  Overall muscle tone: normal  Right arm pronator drift: absent  Left arm pronator drift: absent    Strength   Strength 5/5 throughout.     Gait, Coordination, and Reflexes     Gait  Gait: (using long staff)    Tremor   Resting tremor: absent    Reflexes   Right patellar: 2+  Left patellar: 2+     CT HEAD WO CONTRAST ON 7/29/2022   INDICATION: Altered mental status     COMPARISON: CT head 6/1/2022     TECHNICAL: Axial CT imaging obtained from vertex to skull base. Axial images and multiplanar reformatted images reviewed.   Dose modulation, iterative reconstruction and/or weight based adjustments of the mA/kV were utilized to reduce radiation dose to   as low as reasonably achievable     IV Contrast: None.     LIMITATIONS: None     FINDINGS:     : No additional abnormality     INTRACRANIAL HEMORRHAGE: No intra- or extra-axial hemorrhage.     VENTRICLES: The ventricles are enlarged. There is no midline shift or  mass effect.     BRAIN PARENCHYMA: There is moderate atrophy. Decreased attenuation is seen in the periventricular white matter.     SKULL: No destructive osseous process or fracture.     PARANASAL SINUSES AND MASTOIDS: The visualized paranasal sinuses and mastoid air cells are clear.     ORBITS: Normal as visualized.     EXTRACRANIAL SOFT TISSUES: Unremarkable.    Results Review:   I have reviewed the patient's other medical records to include, labs, radiology and referrals.   I reviewed the patient's other test results and agree with the interpretation  Previous hospitalization records and imaging reviewed    Assessment / Plan      Assessment/Plan:   Diagnoses and all orders for this visit:    1. Dementia without behavioral disturbance (HCC) (Primary)  -     donepezil (Aricept) 10 MG tablet; Take 1 tablet by mouth Every Night.  Dispense: 90 tablet; Refill: 3  -     US Carotid Bilateral; Future    2. Other lack of coordination  -     US Carotid Bilateral; Future    Patient has history of stroke, changes in cognition and personality over the last few months.  Imaging unremarkable.  I have ordered carotid ultrasound to assess for stenosis, patient has been a lifelong smoker.  I have increased his donepezil to 10 mg.  Daughter states that patient has improved significantly since initiating Seroquel nightly, does not feel need for additional medication change at this time.  If irritability continues, would suggest Lexapro or Remeron if sleep is problematic.  Patient does admit to difficulty with operating car, I have discussed with him and his daughter that if he is experiencing difficulty driving then he should no longer drive.  Acknowledged this information.    Follow Up:   Return in about 3 months (around 2/4/2023).     WILLIAM Martinez  [facility]   AS THE PROVIDER, I PERSONALLY WORE PPE DURING ENTIRE FACE TO FACE ENCOUNTER IN CLINIC WITH THE PATIENT. PATIENT ALSO WORE PPE DURING ENTIRE  FACE TO FACE ENCOUNTER EXCEPT FOR A MAX OF 30 SECONDS DURING NEUROLOGICAL EVALUATION OF CRANIAL NERVES AND THEN MASK WAS PLACED BACK OVER PATIENT FACE FOR REMAINDER OF VISIT. I WASHED MY HANDS BEFORE AND AFTER VISIT.  45 minutes total time spent in visit reviewing previous records and imaging, obtaining HPI with patient and his daughter, examining patient, discussing treatment options, and developing plan of care.  Please note that portions of this note may have been completed with a voice recognition program. Efforts were made to edit the dictations, but occasionally words are mistranscribed.

## 2022-11-06 ASSESSMENT — ENCOUNTER SYMPTOMS: COUGH: 1

## 2022-11-18 ENCOUNTER — TELEPHONE (OUTPATIENT)
Dept: PRIMARY CARE CLINIC | Age: 82
End: 2022-11-18

## 2022-11-18 DIAGNOSIS — N18.30 STAGE 3 CHRONIC KIDNEY DISEASE, UNSPECIFIED WHETHER STAGE 3A OR 3B CKD (HCC): ICD-10-CM

## 2022-11-18 RX ORDER — TAMSULOSIN HYDROCHLORIDE 0.4 MG/1
0.4 CAPSULE ORAL DAILY
Qty: 30 CAPSULE | Refills: 3 | Status: SHIPPED | OUTPATIENT
Start: 2022-11-18

## 2022-11-18 NOTE — TELEPHONE ENCOUNTER
Sadia needs a refill of his flomax sent to [facility] in [place].  They are requesting to change his pharmacy to [facility] in [place]

## 2022-11-18 NOTE — TELEPHONE ENCOUNTER
Patient came to the office today for more samples of januvia 100mg. This was prescribed by Briseyda Hennessy. Patient was given 6 box/boxes. Qty. 84, Lot # [id number], Exp. Date 2/25.

## 2022-11-23 ENCOUNTER — HOSPITAL ENCOUNTER (OUTPATIENT)
Dept: ULTRASOUND IMAGING | Facility: HOSPITAL | Age: 82
Discharge: HOME | End: 2022-11-23
Admitting: NURSE PRACTITIONER

## 2022-11-23 ENCOUNTER — OFFICE VISIT (OUTPATIENT)
Dept: PULMONOLOGY | Facility: CLINIC | Age: 82
End: 2022-11-23

## 2022-11-23 VITALS
RESPIRATION RATE: 18 BRPM | HEART RATE: 68 BPM | SYSTOLIC BLOOD PRESSURE: 125 MMHG | BODY MASS INDEX: 24.02 KG/M2 | HEIGHT: 69 IN | DIASTOLIC BLOOD PRESSURE: 75 MMHG | WEIGHT: 162.2 LBS | OXYGEN SATURATION: 97 %

## 2022-11-23 DIAGNOSIS — J30.89 OTHER ALLERGIC RHINITIS: ICD-10-CM

## 2022-11-23 DIAGNOSIS — I65.23 BILATERAL CAROTID ARTERY STENOSIS: Primary | ICD-10-CM

## 2022-11-23 DIAGNOSIS — J43.9 PULMONARY EMPHYSEMA, UNSPECIFIED EMPHYSEMA TYPE: ICD-10-CM

## 2022-11-23 DIAGNOSIS — R06.02 SHORTNESS OF BREATH: Primary | ICD-10-CM

## 2022-11-23 DIAGNOSIS — F03.90 DEMENTIA WITHOUT BEHAVIORAL DISTURBANCE: ICD-10-CM

## 2022-11-23 DIAGNOSIS — R27.8 OTHER LACK OF COORDINATION: ICD-10-CM

## 2022-11-23 DIAGNOSIS — J44.9 CHRONIC OBSTRUCTIVE PULMONARY DISEASE, UNSPECIFIED COPD TYPE: ICD-10-CM

## 2022-11-23 DIAGNOSIS — F17.210 NICOTINE DEPENDENCE, CIGARETTES, UNCOMPLICATED: ICD-10-CM

## 2022-11-23 PROCEDURE — 99204 OFFICE O/P NEW MOD 45 MIN: CPT | Performed by: INTERNAL MEDICINE

## 2022-11-23 PROCEDURE — 93880 EXTRACRANIAL BILAT STUDY: CPT

## 2022-11-23 RX ORDER — AZELASTINE 1 MG/ML
1 SPRAY, METERED NASAL 2 TIMES DAILY PRN
Qty: 1 EACH | Refills: 5 | Status: SHIPPED | OUTPATIENT
Start: 2022-11-23

## 2022-11-23 RX ORDER — ALBUTEROL SULFATE 90 UG/1
2 AEROSOL, METERED RESPIRATORY (INHALATION) EVERY 4 HOURS PRN
Qty: 18 G | Refills: 5 | Status: SHIPPED | OUTPATIENT
Start: 2022-11-23

## 2022-11-23 RX ORDER — FLUTICASONE PROPIONATE 50 MCG
1 SPRAY, SUSPENSION (ML) NASAL DAILY
Qty: 16 G | Refills: 5 | Status: SHIPPED | OUTPATIENT
Start: 2022-11-23

## 2022-11-30 ENCOUNTER — HOSPITAL ENCOUNTER (OUTPATIENT)
Dept: PULMONOLOGY | Facility: HOSPITAL | Age: 82
Discharge: HOME | End: 2022-11-30
Admitting: INTERNAL MEDICINE

## 2022-11-30 DIAGNOSIS — J44.9 CHRONIC OBSTRUCTIVE PULMONARY DISEASE, UNSPECIFIED COPD TYPE: ICD-10-CM

## 2022-11-30 DIAGNOSIS — R06.02 SHORTNESS OF BREATH: ICD-10-CM

## 2022-11-30 PROCEDURE — 94010 BREATHING CAPACITY TEST: CPT | Performed by: INTERNAL MEDICINE

## 2022-11-30 PROCEDURE — 94010 BREATHING CAPACITY TEST: CPT

## 2022-11-30 PROCEDURE — 94726 PLETHYSMOGRAPHY LUNG VOLUMES: CPT

## 2022-11-30 PROCEDURE — 94726 PLETHYSMOGRAPHY LUNG VOLUMES: CPT | Performed by: INTERNAL MEDICINE

## 2022-12-22 ENCOUNTER — HOSPITAL ENCOUNTER (OUTPATIENT)
Dept: CT IMAGING | Facility: HOSPITAL | Age: 82
Discharge: HOME | End: 2022-12-22
Admitting: NURSE PRACTITIONER

## 2022-12-22 DIAGNOSIS — I65.23 BILATERAL CAROTID ARTERY STENOSIS: ICD-10-CM

## 2022-12-22 PROCEDURE — 70498 CT ANGIOGRAPHY NECK: CPT

## 2022-12-22 PROCEDURE — 25010000002 IOPAMIDOL 61 % SOLUTION: Performed by: NURSE PRACTITIONER

## 2022-12-22 RX ADMIN — IOPAMIDOL 100 ML: 612 INJECTION, SOLUTION INTRAVENOUS at 12:15

## 2023-01-03 DIAGNOSIS — I10 BENIGN ESSENTIAL HTN: Primary | ICD-10-CM

## 2023-01-03 RX ORDER — LOSARTAN POTASSIUM 50 MG/1
50 TABLET ORAL DAILY
Qty: 90 TABLET | Refills: 1 | Status: SHIPPED | OUTPATIENT
Start: 2023-01-03

## 2023-01-06 ENCOUNTER — OFFICE VISIT (OUTPATIENT)
Dept: NEUROSURGERY | Facility: CLINIC | Age: 83
End: 2023-01-06
Payer: MEDICARE

## 2023-01-06 VITALS — HEIGHT: 69 IN | TEMPERATURE: 96.8 F | BODY MASS INDEX: 25.12 KG/M2 | WEIGHT: 169.6 LBS

## 2023-01-06 DIAGNOSIS — Z72.0 TOBACCO ABUSE: ICD-10-CM

## 2023-01-06 DIAGNOSIS — F91.9 BEHAVIOR DISTURBANCE: ICD-10-CM

## 2023-01-06 DIAGNOSIS — I65.23 BILATERAL CAROTID ARTERY STENOSIS: Primary | ICD-10-CM

## 2023-01-06 PROCEDURE — 99203 OFFICE O/P NEW LOW 30 MIN: CPT | Performed by: NEUROLOGICAL SURGERY

## 2023-01-06 RX ORDER — QUETIAPINE FUMARATE 25 MG/1
TABLET, FILM COATED ORAL
Qty: 60 TABLET | Refills: 1 | Status: SHIPPED | OUTPATIENT
Start: 2023-01-06

## 2023-01-06 RX ORDER — ASPIRIN 81 MG/1
81 TABLET, CHEWABLE ORAL DAILY
COMMUNITY

## 2023-01-06 RX ORDER — LOSARTAN POTASSIUM 100 MG/1
100 TABLET ORAL DAILY
COMMUNITY
Start: 2023-01-03

## 2023-01-06 NOTE — PROGRESS NOTES
Patient: Eduar Ashton  : 1940    Primary Care Provider: Gabriela Blankenship APRN    Requesting Provider: As above        History    Chief Complaint: Carotid occlusive disease.    History of Present Illness: Ms. Ashton is an 82-year-old right-handed former  who has known carotid occlusive disease.  His daughter reports that he had some mini strokes over a year ago.  This was manifest as falling.  He did not have numbness, weakness, language dysfunction.  He takes a baby aspirin and Crestor.  He has diabetes which is fairly well controlled by her report.  He continues to smoke at least a pack tobacco per day.  He has smoked since he was 13 years old.    Review of Systems   Constitutional: Positive for fatigue. Negative for activity change, appetite change, chills, diaphoresis, fever and unexpected weight change.   HENT: Positive for congestion, drooling and hearing loss. Negative for dental problem, ear discharge, ear pain, facial swelling, mouth sores, nosebleeds, postnasal drip, rhinorrhea, sinus pressure, sinus pain, sneezing, sore throat, tinnitus, trouble swallowing and voice change.    Eyes: Negative for photophobia, pain, discharge, redness, itching and visual disturbance.   Respiratory: Positive for cough and shortness of breath. Negative for apnea, choking, chest tightness, wheezing and stridor.    Cardiovascular: Positive for leg swelling. Negative for chest pain and palpitations.   Gastrointestinal: Negative for abdominal distention, abdominal pain, anal bleeding, blood in stool, constipation, diarrhea, nausea, rectal pain and vomiting.   Endocrine: Negative for cold intolerance, heat intolerance, polydipsia, polyphagia and polyuria.   Genitourinary: Negative for decreased urine volume, difficulty urinating, dysuria, enuresis, flank pain, frequency, genital sores, hematuria and urgency.   Musculoskeletal: Positive for neck pain and neck stiffness. Negative for arthralgias, back  pain, gait problem, joint swelling and myalgias.   Skin: Negative for color change, pallor, rash and wound.   Allergic/Immunologic: Negative for environmental allergies, food allergies and immunocompromised state.   Neurological: Positive for dizziness, speech difficulty and light-headedness. Negative for tremors, seizures, syncope, facial asymmetry, weakness, numbness and headaches.   Hematological: Negative for adenopathy. Bruises/bleeds easily.   Psychiatric/Behavioral: Positive for agitation, confusion, decreased concentration, hallucinations and sleep disturbance. Negative for behavioral problems, dysphoric mood, self-injury and suicidal ideas. The patient is nervous/anxious. The patient is not hyperactive.    All other systems reviewed and are negative.      The patient's past medical history, past surgical history, family history, and social history have been reviewed at length in the electronic medical record.      Physical Exam:   Temp 96.8 °F (36 °C) (Infrared)   Ht 175.3 cm (69.02\")   Wt 76.9 kg (169 lb 9.6 oz)   BMI 25.03 kg/m²   CONSTITUTIONAL: Patient is well-nourished, pleasant and appears stated age.  MUSCULOSKELETAL:  Neck tenderness to palpation is not observed.   ROM in the neck is normal.  NEUROLOGICAL:  Orientation, memory, attention span, language function, and cognition have been examined and are intact.  Strength is intact in the upper and lower extremities to direct testing.  Muscle tone is normal throughout.  Station and gait are somewhat unsteady.  Sensation is intact to light touch testing throughout.  Deep tendon reflexes are difficult to elicit throughout..  Anam's Sign is negative bilaterally. No clonus is elicited.  Coordination is intact.      Medical Decision Making    Data Review:   (All imaging studies were personally reviewed unless stated otherwise)  Carotid carotid duplex study dated 11/23/2022 (report) suggest greater than 70% stenosis of the left internal carotid  artery and less than 50% stenosis of the right internal carotid artery.      CTA of the neck dated 12/22/2022 demonstrates approximately 80% narrowing of the left proximal ICA and 20 to 30% narrowing of the right proximal ICA.    Diagnosis:   Asymptomatic carotid occlusive disease.    Treatment Options:   The patient will follow-up in 8 months with a carotid duplex study.  If he develops symptomatic disease or if his stenosis progresses then endarterectomy would be considered.  Smoking cessation is of great importance here and that has been discussed with him at length.       Diagnosis Plan   1. Bilateral carotid artery stenosis        2. Tobacco abuse            Scribed for Los Steward MD by JOSH Mcintosh 1/6/2023 14:58 EST      I, Dr. Steward, personally performed the services described in the documentation, as scribed in my presence, and it is both accurate and complete.

## 2023-02-01 ENCOUNTER — LAB (OUTPATIENT)
Dept: LAB | Facility: HOSPITAL | Age: 83
End: 2023-02-01
Payer: MEDICARE

## 2023-02-01 ENCOUNTER — OFFICE VISIT (OUTPATIENT)
Dept: NEUROLOGY | Facility: CLINIC | Age: 83
End: 2023-02-01
Payer: MEDICARE

## 2023-02-01 VITALS
OXYGEN SATURATION: 98 % | TEMPERATURE: 97.3 F | SYSTOLIC BLOOD PRESSURE: 112 MMHG | HEIGHT: 69 IN | HEART RATE: 82 BPM | WEIGHT: 163 LBS | BODY MASS INDEX: 24.14 KG/M2 | DIASTOLIC BLOOD PRESSURE: 50 MMHG

## 2023-02-01 DIAGNOSIS — R45.4 BEHAVIOR-IRRITABILITY: ICD-10-CM

## 2023-02-01 DIAGNOSIS — F03.90 DEMENTIA WITHOUT BEHAVIORAL DISTURBANCE: Primary | ICD-10-CM

## 2023-02-01 DIAGNOSIS — F03.90 DEMENTIA WITHOUT BEHAVIORAL DISTURBANCE: ICD-10-CM

## 2023-02-01 PROCEDURE — 86592 SYPHILIS TEST NON-TREP QUAL: CPT | Performed by: NURSE PRACTITIONER

## 2023-02-01 PROCEDURE — 99213 OFFICE O/P EST LOW 20 MIN: CPT | Performed by: NURSE PRACTITIONER

## 2023-02-01 PROCEDURE — 36415 COLL VENOUS BLD VENIPUNCTURE: CPT

## 2023-02-01 PROCEDURE — 82607 VITAMIN B-12: CPT

## 2023-02-01 PROCEDURE — 82746 ASSAY OF FOLIC ACID SERUM: CPT

## 2023-02-01 PROCEDURE — 83921 ORGANIC ACID SINGLE QUANT: CPT

## 2023-02-01 RX ORDER — ESCITALOPRAM OXALATE 5 MG/1
5 TABLET ORAL EVERY MORNING
Qty: 90 TABLET | Refills: 1 | Status: SHIPPED | OUTPATIENT
Start: 2023-02-01

## 2023-02-01 NOTE — PROGRESS NOTES
Follow Up Neurology Office Visit      Patient Name: Eduar Ashton    Referring Physician: No ref. provider found    Chief Complaint:    Chief Complaint   Patient presents with   • Follow-up   • Dementia     Dementia without behavioral disturbance       History of Present Illness: Eduar Ashton is a 82 y.o. male who is here to follow up with Neurology for  Memory disorder. He is accompanied by his daughter Aziza today.     Daughter reports concerns about confrontation, aggression. This is intermittent, but does occur regularly, she estimates a few times per month.     Initial History of Present Illness: Eduar Ashton is a 82 y.o. male who is here today to establish care with Neurology. Accompanied by his daughter Aziza.      'I forgot how to work some of the things on my car'     Daughter reports that patient has had 3 'mini strokes', also became worse after chela COVID.      Wife handles finances, this has always been their arrangement     Wife also prepares his medications     Still drives, but admits that he has difficulty operating car     Never has used computers, cell phone     Previously had trouble with aggression, this has improved significantly since starting Seroquel.  Daughter feels that he has improved significantly, in general, in the past 3 months.     He has been  63 years, he enjoys cooking, previously worked in construction and semitruck driving.    The following portions of the patient's history were reviewed and updated as appropriate: allergies, current medications, past family history, past medical history, past social history, past surgical history and problem list.    Subjective     Review of Systems:   Review of Systems   Neurological: Positive for memory problem and confusion.   Psychiatric/Behavioral: Positive for agitation. Negative for sleep disturbance.   All other systems reviewed and are negative.    Medications:     Current Outpatient Medications:   •   albuterol sulfate HFA (Ventolin HFA) 108 (90 Base) MCG/ACT inhaler, Inhale 2 puffs Every 4 (Four) Hours As Needed for Wheezing or Shortness of Air., Disp: 18 g, Rfl: 5  •  aspirin 81 MG chewable tablet, Chew 81 mg Daily., Disp: , Rfl:   •  azelastine (ASTELIN) 0.1 % nasal spray, 1 spray into the nostril(s) as directed by provider 2 (Two) Times a Day As Needed for Rhinitis or Allergies. Use in each nostril as directed, Disp: 1 each, Rfl: 5  •  cilostazol (PLETAL) 100 MG tablet, , Disp: , Rfl:   •  donepezil (Aricept) 10 MG tablet, Take 1 tablet by mouth Every Night., Disp: 90 tablet, Rfl: 3  •  ergocalciferol (ERGOCALCIFEROL) 1.25 MG (15493 UT) capsule, Take 1 capsule by mouth 1 (One) Time Per Week., Disp: , Rfl:   •  fluticasone (FLONASE) 50 MCG/ACT nasal spray, 1 spray into the nostril(s) as directed by provider Daily. Administer 1 spray in each nostril for each dose., Disp: 16 g, Rfl: 5  •  furosemide (LASIX) 20 MG tablet, Daily., Disp: , Rfl:   •  hydrALAZINE (APRESOLINE) 25 MG tablet, , Disp: , Rfl:   •  Januvia 100 MG tablet, , Disp: , Rfl:   •  losartan (COZAAR) 100 MG tablet, Take 100 mg by mouth Daily., Disp: , Rfl:   •  metoprolol tartrate (LOPRESSOR) 25 MG tablet, , Disp: , Rfl:   •  multivitamin with minerals tablet tablet, Take 1 tablet by mouth Daily., Disp: , Rfl:   •  omeprazole (priLOSEC) 20 MG capsule, , Disp: , Rfl:   •  potassium chloride (K-DUR,KLOR-CON) 20 MEQ CR tablet, , Disp: , Rfl:   •  QUEtiapine (SEROquel) 25 MG tablet, , Disp: , Rfl:   •  rosuvastatin (CRESTOR) 20 MG tablet, , Disp: , Rfl:   •  tamsulosin (FLOMAX) 0.4 MG capsule 24 hr capsule, , Disp: , Rfl:   •  tiotropium bromide-olodaterol (STIOLTO RESPIMAT) 2.5-2.5 MCG/ACT aerosol solution inhaler, Inhale 2 puffs Daily., Disp: 1 each, Rfl: 5  •  escitalopram (Lexapro) 5 MG tablet, Take 1 tablet by mouth Every Morning., Disp: 90 tablet, Rfl: 1    Allergies:   No Known Allergies    Objective     Physical Exam:  Vital Signs:   Vitals:  "   02/01/23 1423   BP: 112/50   Pulse: 82   Temp: 97.3 °F (36.3 °C)   SpO2: 98%   Weight: 73.9 kg (163 lb)   Height: 175.3 cm (69.02\")   PainSc:   2   PainLoc: Neck       Physical Exam  Vitals and nursing note reviewed. Exam conducted with a chaperone present (Daughter Aziza).   Pulmonary:      Effort: Pulmonary effort is normal.   Neurological:      Mental Status: Mental status is at baseline.      Cranial Nerves: Cranial nerves 2-12 are intact.      Motor: Motor strength is normal.   Psychiatric:         Mood and Affect: Mood normal.         Speech: Speech is tangential.         Behavior: Behavior normal.         Neurologic Exam     Mental Status   Oriented to person.   Disoriented to place.   Oriented to year, month and season.   Concentration: decreased.   Level of consciousness: alert  Normal comprehension.     Cranial Nerves   Cranial nerves II through XII intact.     Motor Exam   Muscle bulk: normal  Overall muscle tone: normal    Strength   Strength 5/5 throughout.     Gait, Coordination, and Reflexes     Tremor   Resting tremor: absent    Results Review:   I have reviewed the patient's other medical records to include, labs, radiology and referrals.     Assessment / Plan      Assessment/Plan:   Diagnoses and all orders for this visit:    1. Dementia without behavioral disturbance (HCC) (Primary)  -     RPR  -     Methylmalonic Acid, Serum; Future  -     Folate; Future  -     Vitamin B12; Future    2. Behavior-irritability  -     escitalopram (Lexapro) 5 MG tablet; Take 1 tablet by mouth Every Morning.  Dispense: 90 tablet; Refill: 1  -     RPR  -     Methylmalonic Acid, Serum; Future  -     Folate; Future  -     Vitamin B12; Future       Follow Up:   Return in about 6 months (around 8/1/2023) for Next scheduled follow up.     WILLIAM Martinez  [facility]   AS THE PROVIDER, I PERSONALLY WORE PPE DURING ENTIRE FACE TO FACE ENCOUNTER IN CLINIC WITH THE PATIENT. PATIENT ALSO WORE " PPE DURING ENTIRE FACE TO FACE ENCOUNTER EXCEPT FOR A MAX OF 30 SECONDS DURING NEUROLOGICAL EVALUATION OF CRANIAL NERVES AND THEN MASK WAS PLACED BACK OVER PATIENT FACE FOR REMAINDER OF VISIT. I WASHED MY HANDS BEFORE AND AFTER VISIT.    Please note that portions of this note may have been completed with a voice recognition program. Efforts were made to edit the dictations, but occasionally words are mistranscribed.

## 2023-02-02 LAB
FOLATE SERPL-MCNC: 10.2 NG/ML (ref 4.78–24.2)
RPR SER QL: NORMAL
VIT B12 BLD-MCNC: 334 PG/ML (ref 211–946)

## 2023-02-03 ENCOUNTER — OFFICE VISIT (OUTPATIENT)
Dept: PRIMARY CARE CLINIC | Age: 83
End: 2023-02-03
Payer: MEDICARE

## 2023-02-03 VITALS
TEMPERATURE: 97.2 F | WEIGHT: 162 LBS | SYSTOLIC BLOOD PRESSURE: 80 MMHG | BODY MASS INDEX: 24.55 KG/M2 | RESPIRATION RATE: 16 BRPM | DIASTOLIC BLOOD PRESSURE: 44 MMHG | HEIGHT: 68 IN

## 2023-02-03 DIAGNOSIS — I10 BENIGN ESSENTIAL HTN: ICD-10-CM

## 2023-02-03 DIAGNOSIS — F91.9 BEHAVIOR DISTURBANCE: ICD-10-CM

## 2023-02-03 DIAGNOSIS — I49.9 IRREGULAR HEART RATE: Primary | ICD-10-CM

## 2023-02-03 PROCEDURE — 3078F DIAST BP <80 MM HG: CPT | Performed by: NURSE PRACTITIONER

## 2023-02-03 PROCEDURE — 4004F PT TOBACCO SCREEN RCVD TLK: CPT | Performed by: NURSE PRACTITIONER

## 2023-02-03 PROCEDURE — G8484 FLU IMMUNIZE NO ADMIN: HCPCS | Performed by: NURSE PRACTITIONER

## 2023-02-03 PROCEDURE — G8420 CALC BMI NORM PARAMETERS: HCPCS | Performed by: NURSE PRACTITIONER

## 2023-02-03 PROCEDURE — G8427 DOCREV CUR MEDS BY ELIG CLIN: HCPCS | Performed by: NURSE PRACTITIONER

## 2023-02-03 PROCEDURE — 99214 OFFICE O/P EST MOD 30 MIN: CPT | Performed by: NURSE PRACTITIONER

## 2023-02-03 PROCEDURE — 1123F ACP DISCUSS/DSCN MKR DOCD: CPT | Performed by: NURSE PRACTITIONER

## 2023-02-03 PROCEDURE — 3074F SYST BP LT 130 MM HG: CPT | Performed by: NURSE PRACTITIONER

## 2023-02-03 RX ORDER — AZELASTINE 1 MG/ML
SPRAY, METERED NASAL
COMMUNITY
Start: 2022-11-23

## 2023-02-03 RX ORDER — ESCITALOPRAM OXALATE 5 MG/1
TABLET ORAL
COMMUNITY
Start: 2023-02-01

## 2023-02-03 RX ORDER — LOSARTAN POTASSIUM 100 MG/1
TABLET ORAL
COMMUNITY
Start: 2023-01-03

## 2023-02-03 RX ORDER — ALBUTEROL SULFATE 90 UG/1
AEROSOL, METERED RESPIRATORY (INHALATION)
COMMUNITY
Start: 2022-12-22

## 2023-02-03 RX ORDER — DONEPEZIL HYDROCHLORIDE 10 MG/1
TABLET, FILM COATED ORAL
COMMUNITY
Start: 2023-01-04 | End: 2023-02-06

## 2023-02-03 SDOH — ECONOMIC STABILITY: INCOME INSECURITY: HOW HARD IS IT FOR YOU TO PAY FOR THE VERY BASICS LIKE FOOD, HOUSING, MEDICAL CARE, AND HEATING?: NOT HARD AT ALL

## 2023-02-03 SDOH — ECONOMIC STABILITY: FOOD INSECURITY: WITHIN THE PAST 12 MONTHS, THE FOOD YOU BOUGHT JUST DIDN'T LAST AND YOU DIDN'T HAVE MONEY TO GET MORE.: NEVER TRUE

## 2023-02-03 SDOH — ECONOMIC STABILITY: FOOD INSECURITY: WITHIN THE PAST 12 MONTHS, YOU WORRIED THAT YOUR FOOD WOULD RUN OUT BEFORE YOU GOT MONEY TO BUY MORE.: NEVER TRUE

## 2023-02-03 SDOH — ECONOMIC STABILITY: HOUSING INSECURITY
IN THE LAST 12 MONTHS, WAS THERE A TIME WHEN YOU DID NOT HAVE A STEADY PLACE TO SLEEP OR SLEPT IN A SHELTER (INCLUDING NOW)?: NO

## 2023-02-03 ASSESSMENT — ENCOUNTER SYMPTOMS
EYES NEGATIVE: 1
GASTROINTESTINAL NEGATIVE: 1
ALLERGIC/IMMUNOLOGIC NEGATIVE: 1
SHORTNESS OF BREATH: 1

## 2023-02-03 ASSESSMENT — PATIENT HEALTH QUESTIONNAIRE - PHQ9
1. LITTLE INTEREST OR PLEASURE IN DOING THINGS: 1
SUM OF ALL RESPONSES TO PHQ QUESTIONS 1-9: 2
SUM OF ALL RESPONSES TO PHQ9 QUESTIONS 1 & 2: 2
SUM OF ALL RESPONSES TO PHQ QUESTIONS 1-9: 2
2. FEELING DOWN, DEPRESSED OR HOPELESS: 1
SUM OF ALL RESPONSES TO PHQ QUESTIONS 1-9: 2
SUM OF ALL RESPONSES TO PHQ QUESTIONS 1-9: 2

## 2023-02-03 NOTE — PATIENT INSTRUCTIONS
Keep a list of your medicines with you. List all of the prescription medicines, nonprescription medicines, supplements, natural remedies, and vitamins that you take. Tell your healthcare providers who treat you about all of the products you are taking. Your provider can provide you with a form to keep track of them. Just ask. Follow the directions that come with your medicine, including information about food or alcohol. Make sure you know how and when to take your medicine. Do not take more or less than you are supposed to take. Keep all medicines out of the reach of children. Store medicines according to the directions on the label. Monitor yourself. Learn to know how your body reacts to your new medicine and keep track of how it makes you feel before attempting (If your provider has allowed you to do so) to drive or go to work. Seek emergency medical attention if you think you have used too much of this medicine. An overdose of any prescription medicine can be fatal. Overdose symptoms may include extreme drowsiness, muscle weakness, confusion, cold and clammy skin, pinpoint pupils, shallow breathing, slow heart rate, fainting, or coma. Don't share prescription medicines with others, even when they seem to have the same symptoms. What may be good for you may be harmful to others. If you are no longer taking a prescribed medication and you have pills left please take your pills out of their original containers. Mix crushed pills with an undesirable substance, such as cat litter or used coffee grounds. Put the mixture into a disposable container with a lid, such as an empty margarine tub, or into a sealable bag. Cover up or remove any of your personal information on the empty containers by covering it with black permanent marker or duct tape. Place the sealed container with the mixture, and the empty drug containers, in the trash.    If you use a medication that is in the form of a patch, dispose of used patches by folding them in half so that the sticky sides meet, and then flushing them down a toilet. They should not be placed in the household trash where children or pets can find them. If you have any questions, ask your provider or pharmacist for more information. Be sure to keep all appointments for provider visits or tests. We are committed to providing you with the best care possible. In order to help us achieve these goals please remember to bring all medications, herbal products, and over the counter supplements with you to each visit. If your provider has ordered testing for you, please be sure to follow up with our office if you have not received results within 7 days after the testing took place. *If you receive a survey after visiting one of our offices, please take time to share your experience concerning your physician office visit. These surveys are confidential and no health information about you is shared. We are eager to improve for you and we are counting on your feedback to help make that happen. ips to Help You Stop Smoking       Cigarette smoking is a preventable cause of death in the [facility]. If you have thought about quitting but haven't been able to, here are some reasons why you should and some ways to do it. Here's Why   Quitting smoking now can decrease your risk of getting smoking-related illnesses like:   Heart disease   Stroke   Several types of cancer, including:   Lung   Mouth   Esophagus   Larynx   Bladder   Pancreas   Kidney   Chronic lung diseases:   Bronchitis   Emphysema   Asthma   Cataracts   Macular degeneration   Thyroid conditions   Hearing loss   Erectile dysfunction   Dementia   Osteoporosis   Here's How   Once you've decided to quit smoking, set your target quit date a few weeks away.  In the time leading up to your quit day, try some of these ideas offered by the [address] [place] [address] of the [employer] to help you successfully quit smoking. For the best results, work with your doctor. Together, you can test your lung function and compare the results to those of a nonsmoking person. The results can be given to you as your lung age. Finding out your lung age right after having the test done may help you to stop smoking. Your doctor can also discuss with you all of your options and refer you to smoking-cessation support groups. You may wish to use nicotine replacement (gum, patches, inhaler) or one of the prescription medications that have been shown to increase quit rates and prolong abstinence from smoking. But whatever you and your doctor decide on these matters, it will still be you who decides when an how to quit. Here are some techniques:   Switch Brands   Switch to a brand you find distasteful. Change to a brand that is low in tar and nicotine a couple of weeks before your target quit date. This will help change your smoking behavior. However, do not smoke more cigarettes, inhale them more often or more deeply, or place your fingertips over the holes in the filters. All of these actions will increase your nicotine intake, and the idea is to get your body used to functioning without nicotine. Cut Down the Number of Cigarettes You Smoke   Smoke only half of each cigarette. Each day, postpone the lighting of your first cigarette by one hour. Decide you'll only smoke during odd or even hours of the day. Decide beforehand how many cigarettes you'll smoke during the day. For each additional cigarette, give a dollar to your favorite lj. Change your eating habits to help you cut down. For example, drink milk, which many people consider incompatible with smoking. End meals or snacks with something that won't lead to a cigarette. Reach for a glass of juice instead of a cigarette for a \"pick-me-up. \"   Remember: Cutting down can help you quit, but it's not a substitute for quitting.  If you're down to about seven cigarettes a day, it's time to set your target quit date, and get ready to stick to it. Don't Smoke \"Automatically\"   Smoke only those cigarettes you really want. Catch yourself before you light up a cigarette out of pure habit. Don't empty your ashtrays. This will remind you of how many cigarettes you've smoked each day, and the sight and the smell of stale cigarettes butts will be very unpleasant. Make yourself aware of each cigarette by using the opposite hand or putting cigarettes in an unfamiliar location or a different pocket to break the automatic reach. If you light up many times during the day without even thinking about it, try to look in a mirror each time you put a match to your cigarette. You may decide you don't need it. Make Smoking Inconvenient   Stop buying cigarettes by the carton. Wait until one pack is empty before you buy another. Stop carrying cigarettes with you at home or at work. Make them difficult to get to. Make Smoking Unpleasant   Smoke only under circumstances that aren't especially pleasurable for you. If you like to smoke with others, smoke alone. Turn your chair to an empty corner and focus only on the cigarette you are smoking and all its many negative effects. Collect all your cigarette butts in one large glass container as a visual reminder of the filth made by smoking. Just Before Quitting   Practice going without cigarettes. Don't think of never smoking again. Think of quitting in terms of one day at a time . Tell yourself you won't smoke today, and then don't. Clean your clothes to rid them of the cigarette smell, which can linger a long time. On the Day You Quit   Throw away all your cigarettes and matches. Hide your lighters and ashtrays. Visit the dentist and have your teeth cleaned to get rid of tobacco stains. Notice how nice they look and resolve to keep them that way.    Make a list of things you'd like to buy for yourself or someone else. Estimate the cost in terms of packs of cigarettes, and put the money aside to buy these presents. Keep very busy on the big day. Go to the movies, exercise, take long walks, or go bike riding. Remind your family and friends that this is your quit date, and ask them to help you over the rough spots of the first couple of days and weeks. Buy yourself a treat or do something special to celebrate. Telephone and Internet Support   Telephone, web-, and computer-based programs can offer you the support that you need to quit and to stay smoke-free. You can find many programs online, like the [employer] Association's Five Points from Smoking . Immediately After Quitting   Develop a clean, fresh, nonsmoking environment around yourselfat work and at home. Buy yourself flowersyou may be surprised how much you can enjoy their scent now. The first few days after you quit, spend as much free time as possible in places where smoking isn't allowed, such as [address] [place] [address], museums, theaters, department stores, and churches. Drink large quantities of water and fruit juice (but avoid sodas that contain caffeine). Try to avoid alcohol, coffee, and other beverages that you associate with cigarette smoking. Strike up conversation instead of a match for a cigarette. If you miss the sensation of having a cigarette in your hand, play with something elsea pencil, a paper clip, a marble. If you miss having something in your mouth, try toothpicks or a fake cigarette.

## 2023-02-03 NOTE — PROGRESS NOTES
Chief Complaint   Patient presents with    Heart Problem     Arrhythmia        Have you seen any other physician or provider since your last visit yes - Neurologist    Have you had any other diagnostic tests since your last visit? yes - labs    Have you changed or stopped any medications since your last visit? yes - started Lexapro, increased Losartan and Aricept.      SUBJECTIVE:    Patient ID:Sadia Llanes is a 82 y.o. male.    Chief Complaint   Patient presents with    Heart Problem     Arrhythmia      HPI:  Patient was seen by Neurology and was found to have hypotension along with some heart arrhythmia. He had several changes to his medications. He denies any chest pain but has some shortness of breath and weakness in his extremity. He has bee to Pulmonology but not Cardiology.    His daughter says he has been a little better since changing some of his medication. She thinks the Lexapro is helping.    Patient's medications, allergies, past medical, surgical, social and family histories were reviewed and updated as appropriate.          Review of Systems   Constitutional:  Positive for fatigue.   HENT:  Positive for postnasal drip.    Eyes: Negative.    Respiratory:  Positive for shortness of breath.    Cardiovascular: Negative.    Gastrointestinal: Negative.    Endocrine: Negative.    Genitourinary: Negative.    Musculoskeletal: Negative.    Skin: Negative.    Allergic/Immunologic: Negative.    Neurological:  Positive for weakness.   Hematological: Negative.    Psychiatric/Behavioral:  Positive for behavioral problems and decreased concentration.      OBJECTIVE:  BP (!) 80/44 (Site: Left Upper Arm, Position: Sitting, Cuff Size: Medium Adult)   Temp 97.2 °F (36.2 °C) (Temporal)   Resp 16   Ht 5' 8\" (1.727 m)   Wt 162 lb (73.5 kg)   BMI 24.63 kg/m²    Physical Exam  Vitals and nursing note reviewed.   Constitutional:       Appearance: He is well-developed.   HENT:      Head: Normocephalic and atraumatic.  Eyes:      Conjunctiva/sclera: Conjunctivae normal.      Pupils: Pupils are equal, round, and reactive to light. Neck:      Thyroid: No thyromegaly. Vascular: No JVD. Cardiovascular:      Rate and Rhythm: Normal rate. Rhythm irregular. Heart sounds: No murmur heard. No friction rub. No gallop. Pulmonary:      Effort: Pulmonary effort is normal. No respiratory distress. Breath sounds: Examination of the right-upper field reveals wheezing. Examination of the left-upper field reveals wheezing. Wheezing present. No rales. Abdominal:      General: Bowel sounds are normal. There is no distension. Palpations: Abdomen is soft. Tenderness: There is no abdominal tenderness. There is no guarding. Musculoskeletal:         General: No tenderness. Cervical back: Normal range of motion and neck supple. Skin:     General: Skin is warm and dry. Findings: No rash. Neurological:      Mental Status: He is alert and oriented to person, place, and time. Psychiatric:         Mood and Affect: Mood is anxious. Cognition and Memory: Cognition is impaired. Memory is impaired. Judgment: Judgment normal.       No results found for requested labs within last 30 days. Hemoglobin A1C (%)   Date Value   04/04/2022 7.9 (H)     Microscopic Examination (no units)   Date Value   07/29/2022 YES     LDL Calculated (mg/dL)   Date Value   08/11/2022 70         Lab Results   Component Value Date/Time    WBC 9.0 10/17/2022 10:22 AM    NEUTROABS 6.4 10/17/2022 10:22 AM    HGB 13.6 10/17/2022 10:22 AM    HCT 41.6 10/17/2022 10:22 AM    MCV 92.4 10/17/2022 10:22 AM     10/17/2022 10:22 AM       Lab Results   Component Value Date    TSH 0.85 08/11/2022         ASSESSMENT/PLAN:     1. Irregular heart rate  Will refer to cardiology. - Holter Monitor 48 Hour; Future    2. Behavior disturbance  Continue Seroquel Aricept     3.  Benign essential HTN  Started Cozaar 100 mg, advised to monitor as his blood pressure is low. Advised to take 1/2 tablet if blood pressure remains low.       Written by Lavinia VALLADARES, acting as a scribe for Celeste Aase on 2/3/2023 at 10:17 PM.

## 2023-02-06 LAB — METHYLMALONATE SERPL-SCNC: 506 NMOL/L (ref 0–378)

## 2023-02-06 RX ORDER — DONEPEZIL HYDROCHLORIDE 10 MG/1
TABLET, FILM COATED ORAL
Qty: 90 TABLET | Refills: 1 | Status: SHIPPED | OUTPATIENT
Start: 2023-02-06

## 2023-02-07 ENCOUNTER — HOSPITAL ENCOUNTER (OUTPATIENT)
Dept: RESPIRATORY THERAPY | Facility: HOSPITAL | Age: 83
Discharge: HOME | End: 2023-02-07
Payer: MEDICARE

## 2023-02-07 DIAGNOSIS — I49.9 IRREGULAR HEART RATE: ICD-10-CM

## 2023-02-07 PROCEDURE — 93225 XTRNL ECG REC<48 HRS REC: CPT

## 2023-02-17 ENCOUNTER — OFFICE VISIT (OUTPATIENT)
Dept: PRIMARY CARE CLINIC | Age: 83
End: 2023-02-17
Payer: MEDICARE

## 2023-02-17 VITALS
RESPIRATION RATE: 16 BRPM | SYSTOLIC BLOOD PRESSURE: 86 MMHG | BODY MASS INDEX: 25.01 KG/M2 | OXYGEN SATURATION: 100 % | WEIGHT: 165 LBS | DIASTOLIC BLOOD PRESSURE: 52 MMHG | TEMPERATURE: 97.8 F | HEIGHT: 68 IN | HEART RATE: 77 BPM

## 2023-02-17 DIAGNOSIS — I10 BENIGN ESSENTIAL HTN: ICD-10-CM

## 2023-02-17 DIAGNOSIS — I49.9 IRREGULAR HEART RATE: Primary | ICD-10-CM

## 2023-02-17 PROCEDURE — 99214 OFFICE O/P EST MOD 30 MIN: CPT | Performed by: NURSE PRACTITIONER

## 2023-02-17 PROCEDURE — G8427 DOCREV CUR MEDS BY ELIG CLIN: HCPCS | Performed by: NURSE PRACTITIONER

## 2023-02-17 PROCEDURE — 4004F PT TOBACCO SCREEN RCVD TLK: CPT | Performed by: NURSE PRACTITIONER

## 2023-02-17 PROCEDURE — G8417 CALC BMI ABV UP PARAM F/U: HCPCS | Performed by: NURSE PRACTITIONER

## 2023-02-17 PROCEDURE — 3074F SYST BP LT 130 MM HG: CPT | Performed by: NURSE PRACTITIONER

## 2023-02-17 PROCEDURE — G8484 FLU IMMUNIZE NO ADMIN: HCPCS | Performed by: NURSE PRACTITIONER

## 2023-02-17 PROCEDURE — 1123F ACP DISCUSS/DSCN MKR DOCD: CPT | Performed by: NURSE PRACTITIONER

## 2023-02-17 PROCEDURE — 3078F DIAST BP <80 MM HG: CPT | Performed by: NURSE PRACTITIONER

## 2023-02-17 RX ORDER — LOSARTAN POTASSIUM 50 MG/1
50 TABLET ORAL DAILY
Qty: 30 TABLET | Refills: 3 | Status: SHIPPED | OUTPATIENT
Start: 2023-02-17

## 2023-02-17 RX ORDER — FLUTICASONE PROPIONATE 50 MCG
1 SPRAY, SUSPENSION (ML) NASAL DAILY
COMMUNITY
End: 2023-02-17 | Stop reason: SDUPTHER

## 2023-02-17 RX ORDER — FLUTICASONE PROPIONATE 50 MCG
1 SPRAY, SUSPENSION (ML) NASAL DAILY
Qty: 16 G | Refills: 2 | Status: SHIPPED | OUTPATIENT
Start: 2023-02-17

## 2023-02-17 ASSESSMENT — ENCOUNTER SYMPTOMS
GASTROINTESTINAL NEGATIVE: 1
ALLERGIC/IMMUNOLOGIC NEGATIVE: 1
RESPIRATORY NEGATIVE: 1
EYES NEGATIVE: 1

## 2023-02-17 NOTE — PROGRESS NOTES
Chief Complaint   Patient presents with    Tachycardia     Follow up Holter monitor        Have you seen any other physician or provider since your last visit no    Have you had any other diagnostic tests since your last visit? yes - Holter Monitor    Have you changed or stopped any medications since your last visit? no     I have recommended that this patient have a immunization for pneumonia but he due to refusal reason: not comfortable with test   I have discussed the risks and benefits of this examination with him. The patient verbalizes understanding. Provider will be informed of refusal.      Diabetic retinal exam completed this year? Yes                       * If yes please have patient sign a records release to obtain record to update Health Maintenance                       * If no, please order referral for patient to be scheduled   SUBJECTIVE:    Patient ID:Sadia Ruth is a 81 y.o. male. Chief Complaint   Patient presents with    Tachycardia     Follow up Holter monitor      HPI:  Patient is here to follow up on irregular heart rate. He wore a Holter monitor and it was taken off on Thursday. Patient is doing well with anxiety and mood swings since taking Lexapro. Patient's medications, allergies, past medical, surgical, social and family histories were reviewed and updated as appropriate. Review of Systems   Constitutional: Negative. HENT: Negative. Eyes: Negative. Respiratory: Negative. Cardiovascular: Negative. Gastrointestinal: Negative. Endocrine: Negative. Genitourinary: Negative. Musculoskeletal: Negative. Skin: Negative. Allergic/Immunologic: Negative. Neurological: Negative. Hematological: Negative. Psychiatric/Behavioral:  The patient is nervous/anxious.       OBJECTIVE:  BP (!) 86/52 (Site: Right Upper Arm, Position: Sitting, Cuff Size: Medium Adult)   Pulse 77   Temp 97.8 °F (36.6 °C) (Temporal)   Resp 16   Ht 5' 8\" (1.727 m) Wt 165 lb (74.8 kg)   SpO2 100% Comment: room air  BMI 25.09 kg/m²    Physical Exam  Vitals and nursing note reviewed. Constitutional:       Appearance: He is well-developed. HENT:      Head: Normocephalic and atraumatic. Eyes:      Conjunctiva/sclera: Conjunctivae normal.      Pupils: Pupils are equal, round, and reactive to light. Neck:      Thyroid: No thyromegaly. Vascular: No JVD. Cardiovascular:      Rate and Rhythm: Normal rate and regular rhythm. Heart sounds: No murmur heard. No friction rub. No gallop. Pulmonary:      Effort: Pulmonary effort is normal. No respiratory distress. Breath sounds: Normal breath sounds. No wheezing or rales. Abdominal:      General: Bowel sounds are normal. There is no distension. Palpations: Abdomen is soft. Tenderness: There is no abdominal tenderness. There is no guarding. Musculoskeletal:         General: No tenderness. Cervical back: Normal range of motion and neck supple. Skin:     General: Skin is warm and dry. Findings: No rash. Neurological:      Mental Status: He is alert and oriented to person, place, and time. Psychiatric:         Judgment: Judgment normal.       No results found for requested labs within last 30 days. Hemoglobin A1C (%)   Date Value   04/04/2022 7.9 (H)     Microscopic Examination (no units)   Date Value   07/29/2022 YES     LDL Calculated (mg/dL)   Date Value   08/11/2022 70         Lab Results   Component Value Date/Time    WBC 9.0 10/17/2022 10:22 AM    NEUTROABS 6.4 10/17/2022 10:22 AM    HGB 13.6 10/17/2022 10:22 AM    HCT 41.6 10/17/2022 10:22 AM    MCV 92.4 10/17/2022 10:22 AM     10/17/2022 10:22 AM       Lab Results   Component Value Date    TSH 0.85 08/11/2022         ASSESSMENT/PLAN:     1. Irregular heart rate    - External Referral To Cardiology    2. Benign essential HTN    - losartan (COZAAR) 50 MG tablet;  Take 1 tablet by mouth daily  Dispense: 30 tablet; Refill: 3     Written by Tal VALLADARES, acting as a scribe for Babs Park on 2/17/2023 at 9:52 PM.

## 2023-02-17 NOTE — PATIENT INSTRUCTIONS
Keep a list of your medicines with you. List all of the prescription medicines, nonprescription medicines, supplements, natural remedies, and vitamins that you take. Tell your healthcare providers who treat you about all of the products you are taking. Your provider can provide you with a form to keep track of them. Just ask. Follow the directions that come with your medicine, including information about food or alcohol. Make sure you know how and when to take your medicine. Do not take more or less than you are supposed to take. Keep all medicines out of the reach of children. Store medicines according to the directions on the label. Monitor yourself. Learn to know how your body reacts to your new medicine and keep track of how it makes you feel before attempting (If your provider has allowed you to do so) to drive or go to work. Seek emergency medical attention if you think you have used too much of this medicine. An overdose of any prescription medicine can be fatal. Overdose symptoms may include extreme drowsiness, muscle weakness, confusion, cold and clammy skin, pinpoint pupils, shallow breathing, slow heart rate, fainting, or coma. Don't share prescription medicines with others, even when they seem to have the same symptoms. What may be good for you may be harmful to others. If you are no longer taking a prescribed medication and you have pills left please take your pills out of their original containers. Mix crushed pills with an undesirable substance, such as cat litter or used coffee grounds. Put the mixture into a disposable container with a lid, such as an empty margarine tub, or into a sealable bag. Cover up or remove any of your personal information on the empty containers by covering it with black permanent marker or duct tape. Place the sealed container with the mixture, and the empty drug containers, in the trash.    If you use a medication that is in the form of a patch, dispose of used patches by folding them in half so that the sticky sides meet, and then flushing them down a toilet. They should not be placed in the household trash where children or pets can find them. If you have any questions, ask your provider or pharmacist for more information. Be sure to keep all appointments for provider visits or tests. We are committed to providing you with the best care possible. In order to help us achieve these goals please remember to bring all medications, herbal products, and over the counter supplements with you to each visit. If your provider has ordered testing for you, please be sure to follow up with our office if you have not received results within 7 days after the testing took place. *If you receive a survey after visiting one of our offices, please take time to share your experience concerning your physician office visit. These surveys are confidential and no health information about you is shared. We are eager to improve for you and we are counting on your feedback to help make that happen. ips to Help You Stop Smoking       Cigarette smoking is a preventable cause of death in the [facility]. If you have thought about quitting but haven't been able to, here are some reasons why you should and some ways to do it. Here's Why   Quitting smoking now can decrease your risk of getting smoking-related illnesses like:   Heart disease   Stroke   Several types of cancer, including:   Lung   Mouth   Esophagus   Larynx   Bladder   Pancreas   Kidney   Chronic lung diseases:   Bronchitis   Emphysema   Asthma   Cataracts   Macular degeneration   Thyroid conditions   Hearing loss   Erectile dysfunction   Dementia   Osteoporosis   Here's How   Once you've decided to quit smoking, set your target quit date a few weeks away.  In the time leading up to your quit day, try some of these ideas offered by the [address] [place] [address] of the [employer] to help you successfully quit smoking. For the best results, work with your doctor. Together, you can test your lung function and compare the results to those of a nonsmoking person. The results can be given to you as your lung age. Finding out your lung age right after having the test done may help you to stop smoking. Your doctor can also discuss with you all of your options and refer you to smoking-cessation support groups. You may wish to use nicotine replacement (gum, patches, inhaler) or one of the prescription medications that have been shown to increase quit rates and prolong abstinence from smoking. But whatever you and your doctor decide on these matters, it will still be you who decides when an how to quit. Here are some techniques:   Switch Brands   Switch to a brand you find distasteful. Change to a brand that is low in tar and nicotine a couple of weeks before your target quit date. This will help change your smoking behavior. However, do not smoke more cigarettes, inhale them more often or more deeply, or place your fingertips over the holes in the filters. All of these actions will increase your nicotine intake, and the idea is to get your body used to functioning without nicotine. Cut Down the Number of Cigarettes You Smoke   Smoke only half of each cigarette. Each day, postpone the lighting of your first cigarette by one hour. Decide you'll only smoke during odd or even hours of the day. Decide beforehand how many cigarettes you'll smoke during the day. For each additional cigarette, give a dollar to your favorite lj. Change your eating habits to help you cut down. For example, drink milk, which many people consider incompatible with smoking. End meals or snacks with something that won't lead to a cigarette. Reach for a glass of juice instead of a cigarette for a \"pick-me-up. \"   Remember: Cutting down can help you quit, but it's not a substitute for quitting.  If you're down to about seven cigarettes a day, it's time to set your target quit date, and get ready to stick to it. Don't Smoke \"Automatically\"   Smoke only those cigarettes you really want. Catch yourself before you light up a cigarette out of pure habit. Don't empty your ashtrays. This will remind you of how many cigarettes you've smoked each day, and the sight and the smell of stale cigarettes butts will be very unpleasant. Make yourself aware of each cigarette by using the opposite hand or putting cigarettes in an unfamiliar location or a different pocket to break the automatic reach. If you light up many times during the day without even thinking about it, try to look in a mirror each time you put a match to your cigarette. You may decide you don't need it. Make Smoking Inconvenient   Stop buying cigarettes by the carton. Wait until one pack is empty before you buy another. Stop carrying cigarettes with you at home or at work. Make them difficult to get to. Make Smoking Unpleasant   Smoke only under circumstances that aren't especially pleasurable for you. If you like to smoke with others, smoke alone. Turn your chair to an empty corner and focus only on the cigarette you are smoking and all its many negative effects. Collect all your cigarette butts in one large glass container as a visual reminder of the filth made by smoking. Just Before Quitting   Practice going without cigarettes. Don't think of never smoking again. Think of quitting in terms of one day at a time . Tell yourself you won't smoke today, and then don't. Clean your clothes to rid them of the cigarette smell, which can linger a long time. On the Day You Quit   Throw away all your cigarettes and matches. Hide your lighters and ashtrays. Visit the dentist and have your teeth cleaned to get rid of tobacco stains. Notice how nice they look and resolve to keep them that way.    Make a list of things you'd like to buy for yourself or someone else. Estimate the cost in terms of packs of cigarettes, and put the money aside to buy these presents. Keep very busy on the big day. Go to the movies, exercise, take long walks, or go bike riding. Remind your family and friends that this is your quit date, and ask them to help you over the rough spots of the first couple of days and weeks. Buy yourself a treat or do something special to celebrate. Telephone and Internet Support   Telephone, web-, and computer-based programs can offer you the support that you need to quit and to stay smoke-free. You can find many programs online, like the [employer] Association's [place] from Smoking . Immediately After Quitting   Develop a clean, fresh, nonsmoking environment around yourselfat work and at home. Buy yourself flowersyou may be surprised how much you can enjoy their scent now. The first few days after you quit, spend as much free time as possible in places where smoking isn't allowed, such as [address] [place] [address], museums, theaters, department stores, and churches. Drink large quantities of water and fruit juice (but avoid sodas that contain caffeine). Try to avoid alcohol, coffee, and other beverages that you associate with cigarette smoking. Strike up conversation instead of a match for a cigarette. If you miss the sensation of having a cigarette in your hand, play with something elsea pencil, a paper clip, a marble. If you miss having something in your mouth, try toothpicks or a fake cigarette.

## 2023-02-28 DIAGNOSIS — I49.9 CARDIAC ARRHYTHMIA, UNSPECIFIED CARDIAC ARRHYTHMIA TYPE: Primary | ICD-10-CM

## 2023-03-17 ENCOUNTER — TELEMEDICINE (OUTPATIENT)
Dept: PRIMARY CARE CLINIC | Age: 83
End: 2023-03-17

## 2023-03-17 DIAGNOSIS — I49.9 IRREGULAR HEART RATE: Primary | ICD-10-CM

## 2023-03-17 DIAGNOSIS — I10 BENIGN ESSENTIAL HTN: ICD-10-CM

## 2023-03-17 ASSESSMENT — ENCOUNTER SYMPTOMS
SINUS PRESSURE: 0
NAUSEA: 0
COUGH: 0
EYE DISCHARGE: 0
ABDOMINAL PAIN: 0
WHEEZING: 0
BACK PAIN: 0
SHORTNESS OF BREATH: 0
VOMITING: 0

## 2023-03-17 NOTE — PROGRESS NOTES
units)   Date Value   07/29/2022 YES     LDL Calculated (mg/dL)   Date Value   08/11/2022 70         Lab Results   Component Value Date/Time    WBC 9.0 10/17/2022 10:22 AM    NEUTROABS 6.4 10/17/2022 10:22 AM    HGB 13.6 10/17/2022 10:22 AM    HCT 41.6 10/17/2022 10:22 AM    MCV 92.4 10/17/2022 10:22 AM     10/17/2022 10:22 AM       Lab Results   Component Value Date    TSH 0.85 08/11/2022         ASSESSMENT/PLAN:     1. Irregular heart rate  Metoprolol 25 mg   He is stable will follow with cardiology. 2. Benign essential HTN  Cozaar 50 mg monitor blood pressure closely. No orders of the defined types were placed in this encounter.      Pedro Montano MA am scribing for and in the presence of COURT Reyes on this date of 03/26/23 at 11:34 PM

## 2023-03-18 ENCOUNTER — TELEPHONE (OUTPATIENT)
Dept: PRIMARY CARE CLINIC | Age: 83
End: 2023-03-18

## 2023-03-18 NOTE — TELEPHONE ENCOUNTER
Patient came to the office today for more samples of Januvia 100 mg. This was prescribed by verona Perez. Patient was given 2 box/boxes. Qty. 28, Lot # [id number], Exp. Date 08/25.

## 2023-03-29 ENCOUNTER — OFFICE VISIT (OUTPATIENT)
Dept: PULMONOLOGY | Facility: CLINIC | Age: 83
End: 2023-03-29
Payer: MEDICARE

## 2023-03-29 VITALS
BODY MASS INDEX: 23.99 KG/M2 | HEIGHT: 69 IN | HEART RATE: 73 BPM | DIASTOLIC BLOOD PRESSURE: 56 MMHG | SYSTOLIC BLOOD PRESSURE: 88 MMHG | OXYGEN SATURATION: 96 % | WEIGHT: 162 LBS

## 2023-03-29 DIAGNOSIS — J34.89 RHINORRHEA: ICD-10-CM

## 2023-03-29 DIAGNOSIS — J44.9 CHRONIC OBSTRUCTIVE PULMONARY DISEASE, UNSPECIFIED COPD TYPE: Primary | ICD-10-CM

## 2023-03-29 DIAGNOSIS — F17.210 CIGARETTE SMOKER: ICD-10-CM

## 2023-03-29 RX ORDER — LOSARTAN POTASSIUM 50 MG/1
1 TABLET ORAL DAILY
COMMUNITY
Start: 2023-02-17

## 2023-03-29 NOTE — PROGRESS NOTES
"     Follow Up Office Visit      Patient Name: Eduar Ashton    Chief Complaint:    Chief Complaint   Patient presents with   • Follow-up   • Shortness of Breath       History of Present Illness: Eduar Ashton is a 83 y.o. male who is here today for follow up of COPD. Since his last visit, overnight pulse oximetry test showed no need for nocturnal oxygen use. Prior spirometry suggested moderate obstruction.     The patient reports his breathing has been \"pretty good\" and has been gradually improving.  No exacerbations.  He notes he has been experiencing rhinorrhea. The patient reports he uses nasal sprays and Stiolto inhaler daily. The patient reports he uses an albuterol inhaler once a day. He notes he can do it \"quite a bit\" before he experiences dyspnea. The patient reports he had been coughing a lot; however, it has eased up. He denies wheezing, fevers or hemoptysis.     The patient reports he has lost weight without trying. He notes he went from 200 pounds to 160 pounds approximately 1 year ago. The adult male who accompanies him states that the patient lost weight when he had covid previously; however, he never gained it back.     The patient reports he is still smoking.     Supplemental Oxygen: No    Subjective      Review of Systems:  Review of Systems   Constitutional: Negative for chills, diaphoresis, fatigue and fever.   HENT: Positive for rhinorrhea.    Eyes: Negative for visual disturbance.   Respiratory: Positive for cough and shortness of breath. Negative for chest tightness and wheezing.    Cardiovascular: Negative for chest pain, palpitations and leg swelling.   Gastrointestinal: Negative for abdominal pain.   Genitourinary: Negative for difficulty urinating.   Skin: Negative for rash.   Neurological: Negative for dizziness.   Psychiatric/Behavioral: Negative for confusion.        Past Medical History:   Past Medical History:   Diagnosis Date   • Diabetes mellitus, type 2    • Hypertension  "   • Pneumonia    • Stroke        Past Surgical History:   Past Surgical History:   Procedure Laterality Date   • FOOT SURGERY         Family History:   Family History   Problem Relation Age of Onset   • Dementia Mother    • Stroke Mother    • Heart disease Sister    • Diabetes Daughter        Social History:   Social History     Socioeconomic History   • Marital status:    Tobacco Use   • Smoking status: Every Day     Packs/day: 1.00     Years: 40.00     Pack years: 40.00     Types: Cigarettes   • Smokeless tobacco: Never   Vaping Use   • Vaping Use: Never used   Substance and Sexual Activity   • Alcohol use: Not Currently   • Drug use: Never   • Sexual activity: Defer       Current Medications:     Current Outpatient Medications:   •  albuterol sulfate HFA (Ventolin HFA) 108 (90 Base) MCG/ACT inhaler, Inhale 2 puffs Every 4 (Four) Hours As Needed for Wheezing or Shortness of Air., Disp: 18 g, Rfl: 5  •  aspirin 81 MG chewable tablet, Chew 1 tablet Daily., Disp: , Rfl:   •  azelastine (ASTELIN) 0.1 % nasal spray, 1 spray into the nostril(s) as directed by provider 2 (Two) Times a Day As Needed for Rhinitis or Allergies. Use in each nostril as directed, Disp: 1 each, Rfl: 5  •  cilostazol (PLETAL) 100 MG tablet, , Disp: , Rfl:   •  donepezil (Aricept) 10 MG tablet, Take 1 tablet by mouth Every Night., Disp: 90 tablet, Rfl: 3  •  ergocalciferol (ERGOCALCIFEROL) 1.25 MG (95900 UT) capsule, Take 1 capsule by mouth 1 (One) Time Per Week., Disp: , Rfl:   •  escitalopram (Lexapro) 5 MG tablet, Take 1 tablet by mouth Every Morning., Disp: 90 tablet, Rfl: 1  •  fluticasone (FLONASE) 50 MCG/ACT nasal spray, 1 spray into the nostril(s) as directed by provider Daily. Administer 1 spray in each nostril for each dose., Disp: 16 g, Rfl: 5  •  furosemide (LASIX) 20 MG tablet, Daily., Disp: , Rfl:   •  hydrALAZINE (APRESOLINE) 25 MG tablet, , Disp: , Rfl:   •  Januvia 100 MG tablet, , Disp: , Rfl:   •  losartan (COZAAR) 50 MG  "tablet, Take 1 tablet by mouth Daily., Disp: , Rfl:   •  metoprolol tartrate (LOPRESSOR) 25 MG tablet, , Disp: , Rfl:   •  multivitamin with minerals tablet tablet, Take 1 tablet by mouth Daily., Disp: , Rfl:   •  omeprazole (priLOSEC) 20 MG capsule, , Disp: , Rfl:   •  potassium chloride (K-DUR,KLOR-CON) 20 MEQ CR tablet, , Disp: , Rfl:   •  QUEtiapine (SEROquel) 25 MG tablet, , Disp: , Rfl:   •  rosuvastatin (CRESTOR) 20 MG tablet, , Disp: , Rfl:   •  tamsulosin (FLOMAX) 0.4 MG capsule 24 hr capsule, , Disp: , Rfl:   •  tiotropium bromide-olodaterol (STIOLTO RESPIMAT) 2.5-2.5 MCG/ACT aerosol solution inhaler, Inhale 2 puffs Daily., Disp: 1 each, Rfl: 5  •  losartan (COZAAR) 100 MG tablet, Take 100 mg by mouth Daily. (Patient not taking: Reported on 3/29/2023), Disp: , Rfl:      Allergies:   No Known Allergies    Objective     Physical Exam:  Vital Signs:   Vitals:    03/29/23 1046   BP: (!) 88/56   BP Location: Left arm   Patient Position: Sitting   Cuff Size: Adult   Pulse: 73   SpO2: 96%   Weight: 73.5 kg (162 lb)   Height: 175.3 cm (69\")     Body mass index is 23.92 kg/m².    Physical Exam  Vitals reviewed.   Constitutional:       General: He is not in acute distress.     Appearance: He is not toxic-appearing.   HENT:      Head: Normocephalic and atraumatic.      Mouth/Throat:      Mouth: Mucous membranes are moist.   Eyes:      Extraocular Movements: Extraocular movements intact.      Conjunctiva/sclera: Conjunctivae normal.      Pupils: Pupils are equal, round, and reactive to light.   Cardiovascular:      Rate and Rhythm: Normal rate and regular rhythm.      Heart sounds: No murmur heard.  Pulmonary:      Effort: No respiratory distress.      Breath sounds: No wheezing or rhonchi.   Abdominal:      General: There is no distension.      Palpations: Abdomen is soft.   Musculoskeletal:         General: No swelling.      Cervical back: Neck supple.   Lymphadenopathy:      Cervical: No cervical adenopathy. "   Skin:     General: Skin is warm and dry.      Findings: No rash.   Neurological:      General: No focal deficit present.      Mental Status: He is alert and oriented to person, place, and time.   Psychiatric:         Mood and Affect: Mood normal.         Behavior: Behavior normal.       Assessment / Plan      Assessment/Plan:   Diagnoses and all orders for this visit:    1. Chronic obstructive pulmonary disease, unspecified COPD type (Primary)  Moderate obstruction per most recent spirometry result.  Continue current inhaled regimen.    2. Cigarette smoker  Complete cessation advised. Patients symptoms are not expected to be as controlled as possible while still smoking and progression of lung disease will occur more rapidly with ongoing cigarette use.     3. Rhinorrhea  Continues on azelastine and Flonase nasal sprays.       Follow Up:   July 2023 as previously scheduled  The patient was counseled on diagnostic results, risks and benefits of treatment options, risk factor modifications and the importance of treatment compliance. The patient was advised to contact the clinic with concerns or worsening symptoms.     WILLIAM Romeo   [facility]       Transcribed from ambient dictation for WILLIAM Romeo by Melba Muller.  03/29/23   13:00 EDT    Patient or patient representative verbalized consent to the visit recording.  I have personally performed the services described in this document as transcribed by the above individual, and it is both accurate and complete.

## 2023-03-31 DIAGNOSIS — F91.9 BEHAVIOR DISTURBANCE: ICD-10-CM

## 2023-03-31 RX ORDER — QUETIAPINE FUMARATE 25 MG/1
TABLET, FILM COATED ORAL
Qty: 60 TABLET | Refills: 1 | Status: SHIPPED | OUTPATIENT
Start: 2023-03-31

## 2023-03-31 NOTE — PROGRESS NOTES
CONSULT NOTE    Requested by:   Gabriela Blankenship AP*   Gabriela Blankenship, WILLIAM      Chief Complaint   Patient presents with   • Shortness of Breath   • Consult       Subjective:  Eduar Ashton is a 82 y.o. male.     History of Present Illness   Patient comes in today for evaluation of shortness of breath. Patient says that for the past few years, he has been noticing that his exercise tolerance has been declining. The patient has had days when walking any significant distance is difficult to do without stopping in the middle, to catch his breath.     Patient also complains of some cough and phlegm production that occurs on most mornings out of the week, for most weeks out of the month, for the past few years. Patient used to smoke 1-1.5 packs per day for the past 68 years and is currently smoking 1 pack a day.     There seems to be a seasonal variation to the symptoms.    Patient also complains of runny nose and dribbling in the back of the throat for the past few months. This has not been sometimes associated with seasonal variation.      The following portions of the patient's history were reviewed and updated as appropriate: allergies, current medications, past family history, past medical history, past social history and past surgical history.    Review of Systems   HENT: Positive for sinus pressure. Negative for sneezing and sore throat.    Respiratory: Positive for cough, chest tightness, shortness of breath and wheezing.    Cardiovascular: Positive for leg swelling (some). Negative for chest pain and palpitations.   All other systems reviewed and are negative.      Past Medical History:   Diagnosis Date   • Diabetes mellitus, type 2 (HCC)        Social History     Tobacco Use   • Smoking status: Every Day     Packs/day: 1.00     Types: Cigarettes   • Smokeless tobacco: Never   Substance Use Topics   • Alcohol use: Not Currently         Objective:  Visit Vitals  /75   Pulse 68   Resp 18   Ht  [facility]  Consultation Note     . REASON FOR CONSULTATION: Crohn's flareup      HISTORY OF PRESENT ILLNESS:     This is a 62 y.o. female with PMH including HTN, HLD, hypothyroidism, COPD, arthritis, bipolar 1 disorder, GERD and Crohn's disease, presented to ER with persistent abdominal pain for 3 days. Patient came in with complaint of persistent abdominal pain for 2 to 3 days and was found to be tachycardic. She recently had left hip arthroplasty and intra-articular steroid injection on the right hip. She was also found to be desaturating 85% which improved with 3 L supplemental oxygen. Initial work-up showed leukocytosis, elevated lipase, proBNP and HS troponin. CT chest showed multifocal pneumonia and findings of chronic colitis. Patient was admitted with Intermed service for management of multifocal pneumonia. GI consulted for Crohn's flare up. Patient says that she started having diarrhea 3 days ago along with cramping abdominal pain. She had about 15 episodes of diarrhea per day. She says stools are very loose greenish in color, very foul-smelling and a couple of times has noticed small bright red blood streaks. Patient says she \"felt the blood was from popping up a polyp\". She also had nausea and vomiting and was not able to tolerate any oral intake. She denies fever and chills. She also reports foul-smelling urine with hematuria. She denies use of NSAIDs and only use occasional Tylenol for pain but she is also on gabapentin and Lyrica for neuropathy. She is an active smoker and denies use of illicit drugs and alcohol. She was not using steroids or cholestyramine at home and has not followed up with GI since October. Patient's last Crohn's flareup was in October 2022 at which time she was prescribed prednisone taper and cholestyramine. Currently patient is hemodynamically stable and and maintaining oxygen saturation on 2 L via nasal cannula.   She had 3 "175.3 cm (69\")   Wt 73.6 kg (162 lb 3.2 oz)   SpO2 97%   BMI 23.95 kg/m²       Physical Exam  Vitals reviewed.   Constitutional:       Appearance: He is well-developed.   HENT:      Head:      Comments: No acute lesions noted     Nose:      Comments: Mild nasal erythema noted.     Mouth/Throat:      Mouth: Mucous membranes are moist.      Comments: Oropharynx was somewhat cobblestoned.  Neck:      Vascular: No JVD.   Cardiovascular:      Rate and Rhythm: Normal rate and regular rhythm.   Pulmonary:      Effort: Pulmonary effort is normal.      Breath sounds: Wheezing present. No rales.      Comments: Somewhat hyperresonant to percussion.  Somewhat decreased air entry.  Mild scattered wheezing noted.   Musculoskeletal:      Cervical back: Neck supple.      Right lower leg: Edema present.      Left lower leg: Edema present.      Comments: Used a cane.    Skin:     General: Skin is warm and dry.   Neurological:      Mental Status: He is alert and oriented to person, place, and time.   Psychiatric:         Mood and Affect: Mood normal.         Behavior: Behavior normal.           Assessment/Plan:  Diagnoses and all orders for this visit:    1. Shortness of breath (Primary)  -     Overnight Sleep Oximetry Study; Future  -     Pulmonary Function Test; Future    2. Chronic obstructive pulmonary disease, unspecified COPD type (HCC)  -     Overnight Sleep Oximetry Study; Future  -     Pulmonary Function Test; Future    3. Pulmonary emphysema, unspecified emphysema type (HCC)    4. Nicotine dependence, cigarettes, uncomplicated    5. Other allergic rhinitis    Other orders  -     albuterol sulfate HFA (Ventolin HFA) 108 (90 Base) MCG/ACT inhaler; Inhale 2 puffs Every 4 (Four) Hours As Needed for Wheezing or Shortness of Air.  Dispense: 18 g; Refill: 5  -     tiotropium bromide-olodaterol (STIOLTO RESPIMAT) 2.5-2.5 MCG/ACT aerosol solution inhaler; Inhale 2 puffs Daily.  Dispense: 1 each; Refill: 5  -     azelastine (ASTELIN) " 0.1 % nasal spray; 1 spray into the nostril(s) as directed by provider 2 (Two) Times a Day As Needed for Rhinitis or Allergies. Use in each nostril as directed  Dispense: 1 each; Refill: 5  -     fluticasone (FLONASE) 50 MCG/ACT nasal spray; 1 spray into the nostril(s) as directed by provider Daily. Administer 1 spray in each nostril for each dose.  Dispense: 16 g; Refill: 5        Return in about 4 months (around 3/14/2023) for Recheck, PFT F/U, For Kimberlee Sadler), ....Also 8 mths w/ Dr. Kraus, In [place].    DISCUSSION(if any):  Last CT scan was reviewed in great detail with the patient. Images reviewed personally.   Small left greater than right pleural effusion with dependent atelectasis.  No lung nodules or masses noted.    I have also reviewed his ER note that mentions hemoptysis, tobacco use, pericardial effusion and hypercalcemia.     I have also reviewed his primary care/referring provider's last note that mentions resolution of hemoptysis since the patient's visit to the ER.     ===========================  ===========================    PFTs were ordered for follow up visit.     Laboratory workup also showed   Lab Results   Component Value Date    HGB 13.6 10/17/2022    HGB 13.9 10/13/2022    HGB 14.3 08/11/2022   ,   Lab Results   Component Value Date    HCT 41.6 10/17/2022    HCT 42.1 10/13/2022    HCT 43.9 08/11/2022       Lab Results   Component Value Date    EOSABS 0.1 10/17/2022    EOSABS 0.2 10/13/2022    EOSABS 0.1 08/01/2022    & Laboratory workup also showed No results found for: CO2  Lab Results   Component Value Date    PHART 7.580 (C) 03/11/2022    PHART 7.474 (H) 01/31/2022    PHART 7.463 (H) 01/31/2022    WQB2NJL 21.7 (L) 03/11/2022    OAW1ZJQ 30.3 (L) 01/31/2022    MKS5FJJ 29.3 (L) 01/31/2022    R2ZNBQRD 96.4 03/11/2022    E2IUYPUU 91.1 01/31/2022    V1XJNITD 94.3 01/31/2022     ===========================  ===========================    Orders as above.    I have told the patient,  that in my view, shortness of breath is likely from underlying chronic obstructive lung disease.     Patient was given education and demonstration on how to use the medicine.     Side effects, of prescribed medicines, discussed.    Patient was also instructed on compliance and adherence with instructions.     I have discussed the need to quit smoking as soon as possible.    The patient refuses to quit.     Patient was given reading material, as appropriate.     I told the patient to call us if he develops hemoptysis again.    Patient will be followed clinically to assess for response to treatment and further recommendations will be made, based on response.    Patient will be started on nasal spray for symptoms which are definitely consistent with allergic rhinitis.     If his symptoms do not improve, then we will consider ordering IgE/RAST panel.      Dictated utilizing Dragon dictation.    This document was electronically signed by Shawnee Kraus MD on 11/23/22 at 13:49 EST

## 2023-04-21 RX ORDER — CILOSTAZOL 100 MG/1
100 TABLET ORAL 2 TIMES DAILY
Qty: 60 TABLET | Refills: 1 | Status: SHIPPED | OUTPATIENT
Start: 2023-04-21

## 2023-05-04 PROBLEM — F17.200 TOBACCO DEPENDENCE: Status: ACTIVE | Noted: 2023-05-04

## 2023-05-04 PROBLEM — F03.90 DEMENTIA: Status: ACTIVE | Noted: 2023-05-04

## 2023-05-04 PROBLEM — I65.23 BILATERAL CAROTID ARTERY STENOSIS: Status: ACTIVE | Noted: 2023-05-04

## 2023-05-04 PROBLEM — I10 ESSENTIAL HYPERTENSION: Status: ACTIVE | Noted: 2023-05-04

## 2023-05-04 PROBLEM — J44.9 COPD (CHRONIC OBSTRUCTIVE PULMONARY DISEASE): Status: ACTIVE | Noted: 2023-05-04

## 2023-05-04 PROBLEM — E11.9 TYPE 2 DIABETES MELLITUS: Status: ACTIVE | Noted: 2023-05-04

## 2023-05-04 PROBLEM — Z86.73 HISTORY OF CVA (CEREBROVASCULAR ACCIDENT): Status: ACTIVE | Noted: 2023-05-04

## 2023-05-04 NOTE — PROGRESS NOTES
[facility]  [address], [address]  [place], [place], [address]    [phone]  [facility]           OUTPATIENT CLINIC CONSULTATION NOTE    Patient care team:  Patient Care Team:  Gabriela Blankenship APRN as PCP - General (Family Medicine)    Requesting Provider and Reason for consultation: The patient is being seen today at the request of WILLIAM Grewal for irregular rhythm.     Subjective:   Chief complaint:   Chief Complaint   Patient presents with   • Irregular Heart Beat   • Shortness of Breath       HPI:    Eduar Ashton is a 83 y.o. male.  Cardiac focused problem list:  1. Atrial tachycardia  a. 48 hour Holter monitor, 2/07/2023:  Predominant rhythm is sinus rhythm/sinus bradycardia. Brief atrial tachycardia. Frequent PACs and PVCs. No atrial fibrillation.   2. Hypertension   3. Bilateral carotid stenosis   a. CTA neck, 12/2022:  Demonstrates approximately 80% narrowing of the left proximal ICA and 20-30% narrowing of the right proximal ICA.   4. COPD  5. Type 2 diabetes mellitus   6. History of CVA, 4/2022  a. Echocardiogram, 4/05/2022:  LVEF 55%. Indeterminate diastolic function. Trace MR. No aortic stenosis or insufficiency. Mild TR.   7. Dementia   8. Tobacco dependence     Patient presents today for consultation.  He has above noted cardiac history. Had a CVA about one year ago.  Had an echo at that time with normal EF, trace MR, mild TR and negative saline test. Unclear etiology of stroke.  Had a CTA neck 12/2022 with 90% left ICA stenosis and 20-30% right ICA stenosis.  Is following with neurosurgery, Dr. Steward. No surgical intervention recommended at this time.     Patient was found to have an irregular heart rhythm at his PCP office.  He subsequently wore a 48 hour heart monitor revealing brief atrial tachycardia and frequent PAC's and PVC's.  He has had some mild palpitations that are not bothersome to him.  Has had some  lighthheadedness and syncope in the past.  Most recent syncopal episode about 10 or 11 months ago. Denies exertional chest pain.  Breathing feels ok.  Has some fatigue and weakness.     Review of Systems:  As noted above in the HPI    PFSH:  Patient Active Problem List   Diagnosis   • Essential hypertension   • Bilateral carotid artery stenosis   • COPD (chronic obstructive pulmonary disease)   • Type 2 diabetes mellitus   • History of CVA (cerebrovascular accident)   • Dementia   • Tobacco dependence         Current Outpatient Medications:   •  albuterol sulfate HFA (Ventolin HFA) 108 (90 Base) MCG/ACT inhaler, Inhale 2 puffs Every 4 (Four) Hours As Needed for Wheezing or Shortness of Air., Disp: 18 g, Rfl: 5  •  aspirin 81 MG chewable tablet, Chew 1 tablet Daily., Disp: , Rfl:   •  azelastine (ASTELIN) 0.1 % nasal spray, 1 spray into the nostril(s) as directed by provider 2 (Two) Times a Day As Needed for Rhinitis or Allergies. Use in each nostril as directed, Disp: 1 each, Rfl: 5  •  cilostazol (PLETAL) 100 MG tablet, Take 1 tablet by mouth 2 (Two) Times a Day., Disp: , Rfl:   •  donepezil (Aricept) 10 MG tablet, Take 1 tablet by mouth Every Night., Disp: 90 tablet, Rfl: 3  •  ergocalciferol (ERGOCALCIFEROL) 1.25 MG (16924 UT) capsule, Take 1 capsule by mouth 1 (One) Time Per Week., Disp: , Rfl:   •  escitalopram (Lexapro) 5 MG tablet, Take 1 tablet by mouth Every Morning., Disp: 90 tablet, Rfl: 1  •  fluticasone (FLONASE) 50 MCG/ACT nasal spray, 1 spray into the nostril(s) as directed by provider Daily. Administer 1 spray in each nostril for each dose., Disp: 16 g, Rfl: 5  •  furosemide (LASIX) 20 MG tablet, Take 1 tablet by mouth As Needed., Disp: , Rfl:   •  Januvia 100 MG tablet, Take 1 tablet by mouth Daily., Disp: , Rfl:   •  losartan (COZAAR) 50 MG tablet, Take 1 tablet by mouth Daily., Disp: , Rfl:   •  metoprolol tartrate (LOPRESSOR) 25 MG tablet, Take 1 tablet by mouth 2 (Two) Times a Day. 1/2 tab twice  "a day, Disp: , Rfl:   •  multivitamin with minerals tablet tablet, Take 1 tablet by mouth Daily., Disp: , Rfl:   •  omeprazole (priLOSEC) 20 MG capsule, 1 capsule Daily., Disp: , Rfl:   •  QUEtiapine (SEROquel) 25 MG tablet, Take 1 tablet by mouth Every Night., Disp: , Rfl:   •  rosuvastatin (CRESTOR) 20 MG tablet, Take 1 tablet by mouth Daily., Disp: , Rfl:   •  tamsulosin (FLOMAX) 0.4 MG capsule 24 hr capsule, 1 capsule Daily., Disp: , Rfl:   •  tiotropium bromide-olodaterol (STIOLTO RESPIMAT) 2.5-2.5 MCG/ACT aerosol solution inhaler, Inhale 2 puffs Daily., Disp: 1 each, Rfl: 5    No Known Allergies    Social History     Socioeconomic History   • Marital status:    Tobacco Use   • Smoking status: Every Day     Packs/day: 1.00     Years: 40.00     Pack years: 40.00     Types: Cigarettes   • Smokeless tobacco: Never   Vaping Use   • Vaping Use: Never used   Substance and Sexual Activity   • Alcohol use: Not Currently   • Drug use: Never   • Sexual activity: Defer     Family History   Problem Relation Age of Onset   • Dementia Mother    • Stroke Mother    • Heart disease Sister    • Diabetes Daughter    • Lung cancer Daughter          Objective:   Physical Exam:  /60 (BP Location: Left arm, Patient Position: Sitting)   Pulse 68   Ht 162.6 cm (64\")   Wt 72.6 kg (160 lb)   SpO2 95%   BMI 27.46 kg/m²   CONSTITUTIONAL: No acute distress  RESPIRATORY: Normal effort. Clear to auscultation bilaterally without wheezing or rales  CARDIOVASCULAR: Regular rate and rhythm with normal S1 and S2. Without murmur.  PERIPHERAL VASCULAR: No carotid bruit bilaterally.  Normal radial pulse. There is mild lower extremity edema bilaterally.      Labs:  Labs reviewed by myself    No results found for: CHOL  Lab Results   Component Value Date    TRIG 99 08/11/2022     Lab Results   Component Value Date    HDL 62 (H) 08/11/2022     Lab Results   Component Value Date    LDL 70 08/11/2022     No components found for: " [facility]    Diagnostic Data:      ECG 12 Lead    Date/Time: 5/5/2023 3:02 PM  Performed by: Gregory Mondragon MD  Authorized by: Gregory Mondragon MD   Comparison: not compared with previous ECG   Rhythm: sinus arrhythmia  Ectopy: atrial premature contractions  Rate: normal  BPM: 68  Conduction: conduction normal                  Assessment and Plan:     Atrial tachycardia  PAC's, PVC's  Palpitations  Lightheadedness  Weakness  -Recent 48 hour holter monitor with brief atrial tachycardia and frequent PACs, PVCs.  -Symptoms fairly well controlled currently.   -Continue to monitor clinically.   -Continue beta blocker.     Essential hypertension  -Blood pressure well controlled.   -Continue current related medications.     History of CVA (cerebrovascular accident)  Bilateral carotid artery stenosis  Hyperlipidemia   -Unclear etiology of CVA  -Carotid duplex with >70% left ICA stenosis.   -CT neck with 90% left ICA stenosis and 20-30% right ICA stenosis.   -Following with Dr. Steward, neurosurgery.   -Continue statin.     - Return in about 6 months (around 11/5/2023) for Next scheduled follow up.    Scribed for Gregory Mondragon MD by WILLIAM Gordon. 5/5/2023  16:29 EDT    I, Gregory Mondragon MD, personally performed the services as scribed by the above named individual. I have made any necessary edits and it is both accurate and complete.     Gregory Mondragon MD, MSc, FACC, [facility]  Interventional Cardiology  [facility]

## 2023-05-05 ENCOUNTER — OFFICE VISIT (OUTPATIENT)
Dept: CARDIOLOGY | Facility: CLINIC | Age: 83
End: 2023-05-05
Payer: MEDICARE

## 2023-05-05 ENCOUNTER — HOSPITAL ENCOUNTER (OUTPATIENT)
Facility: HOSPITAL | Age: 83
Discharge: HOME | End: 2023-05-05
Payer: MEDICARE

## 2023-05-05 VITALS
SYSTOLIC BLOOD PRESSURE: 112 MMHG | WEIGHT: 160 LBS | OXYGEN SATURATION: 95 % | BODY MASS INDEX: 27.31 KG/M2 | DIASTOLIC BLOOD PRESSURE: 60 MMHG | HEART RATE: 68 BPM | HEIGHT: 64 IN

## 2023-05-05 DIAGNOSIS — R42 LIGHTHEADEDNESS: ICD-10-CM

## 2023-05-05 DIAGNOSIS — R60.0 LOWER EXTREMITY EDEMA: ICD-10-CM

## 2023-05-05 DIAGNOSIS — I47.1 ATRIAL TACHYCARDIA: Primary | ICD-10-CM

## 2023-05-05 DIAGNOSIS — I65.23 BILATERAL CAROTID ARTERY STENOSIS: ICD-10-CM

## 2023-05-05 DIAGNOSIS — I10 ESSENTIAL HYPERTENSION: ICD-10-CM

## 2023-05-05 DIAGNOSIS — Z86.73 HISTORY OF CVA (CEREBROVASCULAR ACCIDENT): ICD-10-CM

## 2023-05-05 DIAGNOSIS — R53.1 WEAKNESS: ICD-10-CM

## 2023-05-05 PROCEDURE — 93005 ELECTROCARDIOGRAM TRACING: CPT

## 2023-06-29 DIAGNOSIS — N18.30 STAGE 3 CHRONIC KIDNEY DISEASE, UNSPECIFIED WHETHER STAGE 3A OR 3B CKD (HCC): ICD-10-CM

## 2023-06-29 RX ORDER — TAMSULOSIN HYDROCHLORIDE 0.4 MG/1
CAPSULE ORAL
Qty: 120 CAPSULE | Refills: 1 | Status: SHIPPED | OUTPATIENT
Start: 2023-06-29

## 2023-07-05 ENCOUNTER — HOSPITAL ENCOUNTER (OUTPATIENT)
Facility: HOSPITAL | Age: 83
Discharge: HOME | End: 2023-07-05
Payer: MEDICARE

## 2023-07-05 ENCOUNTER — OFFICE VISIT (OUTPATIENT)
Dept: PRIMARY CARE CLINIC | Age: 83
End: 2023-07-05
Payer: MEDICARE

## 2023-07-05 VITALS
DIASTOLIC BLOOD PRESSURE: 71 MMHG | HEART RATE: 58 BPM | TEMPERATURE: 97.3 F | WEIGHT: 146.8 LBS | HEIGHT: 68 IN | RESPIRATION RATE: 16 BRPM | BODY MASS INDEX: 22.25 KG/M2 | SYSTOLIC BLOOD PRESSURE: 113 MMHG | OXYGEN SATURATION: 99 %

## 2023-07-05 DIAGNOSIS — E55.9 VITAMIN D DEFICIENCY: ICD-10-CM

## 2023-07-05 DIAGNOSIS — F33.2 SEVERE EPISODE OF RECURRENT MAJOR DEPRESSIVE DISORDER, WITHOUT PSYCHOTIC FEATURES (HCC): ICD-10-CM

## 2023-07-05 DIAGNOSIS — R63.4 WEIGHT LOSS: ICD-10-CM

## 2023-07-05 DIAGNOSIS — E53.9 VITAMIN B DEFICIENCY: ICD-10-CM

## 2023-07-05 DIAGNOSIS — N18.30 TYPE 2 DIABETES MELLITUS WITH STAGE 3 CHRONIC KIDNEY DISEASE, UNSPECIFIED WHETHER LONG TERM INSULIN USE, UNSPECIFIED WHETHER STAGE 3A OR 3B CKD (HCC): ICD-10-CM

## 2023-07-05 DIAGNOSIS — E11.22 TYPE 2 DIABETES MELLITUS WITH STAGE 3 CHRONIC KIDNEY DISEASE, UNSPECIFIED WHETHER LONG TERM INSULIN USE, UNSPECIFIED WHETHER STAGE 3A OR 3B CKD (HCC): ICD-10-CM

## 2023-07-05 DIAGNOSIS — R29.6 FREQUENT FALLS: ICD-10-CM

## 2023-07-05 DIAGNOSIS — Z00.00 MEDICARE ANNUAL WELLNESS VISIT, SUBSEQUENT: ICD-10-CM

## 2023-07-05 DIAGNOSIS — N18.30 STAGE 3 CHRONIC KIDNEY DISEASE, UNSPECIFIED WHETHER STAGE 3A OR 3B CKD (HCC): ICD-10-CM

## 2023-07-05 DIAGNOSIS — Z74.09 POOR MOBILITY: ICD-10-CM

## 2023-07-05 DIAGNOSIS — J44.9 CHRONIC OBSTRUCTIVE PULMONARY DISEASE, UNSPECIFIED COPD TYPE (HCC): ICD-10-CM

## 2023-07-05 DIAGNOSIS — Z00.00 INITIAL MEDICARE ANNUAL WELLNESS VISIT: Primary | ICD-10-CM

## 2023-07-05 PROCEDURE — 3074F SYST BP LT 130 MM HG: CPT | Performed by: NURSE PRACTITIONER

## 2023-07-05 PROCEDURE — G0438 PPPS, INITIAL VISIT: HCPCS | Performed by: NURSE PRACTITIONER

## 2023-07-05 PROCEDURE — 80053 COMPREHEN METABOLIC PANEL: CPT

## 2023-07-05 PROCEDURE — 82306 VITAMIN D 25 HYDROXY: CPT

## 2023-07-05 PROCEDURE — 84443 ASSAY THYROID STIM HORMONE: CPT

## 2023-07-05 PROCEDURE — 82607 VITAMIN B-12: CPT

## 2023-07-05 PROCEDURE — 83036 HEMOGLOBIN GLYCOSYLATED A1C: CPT

## 2023-07-05 PROCEDURE — 3078F DIAST BP <80 MM HG: CPT | Performed by: NURSE PRACTITIONER

## 2023-07-05 PROCEDURE — 82746 ASSAY OF FOLIC ACID SERUM: CPT

## 2023-07-05 PROCEDURE — 3044F HG A1C LEVEL LT 7.0%: CPT | Performed by: NURSE PRACTITIONER

## 2023-07-05 PROCEDURE — 85027 COMPLETE CBC AUTOMATED: CPT

## 2023-07-05 PROCEDURE — 1123F ACP DISCUSS/DSCN MKR DOCD: CPT | Performed by: NURSE PRACTITIONER

## 2023-07-05 RX ORDER — ESCITALOPRAM OXALATE 10 MG/1
10 TABLET ORAL DAILY
Qty: 90 TABLET | Refills: 1 | Status: SHIPPED | OUTPATIENT
Start: 2023-07-05

## 2023-07-05 ASSESSMENT — PATIENT HEALTH QUESTIONNAIRE - PHQ9
4. FEELING TIRED OR HAVING LITTLE ENERGY: 3
SUM OF ALL RESPONSES TO PHQ QUESTIONS 1-9: 19
SUM OF ALL RESPONSES TO PHQ QUESTIONS 1-9: 19
5. POOR APPETITE OR OVEREATING: 3
SUM OF ALL RESPONSES TO PHQ9 QUESTIONS 1 & 2: 6
SUM OF ALL RESPONSES TO PHQ QUESTIONS 1-9: 19
10. IF YOU CHECKED OFF ANY PROBLEMS, HOW DIFFICULT HAVE THESE PROBLEMS MADE IT FOR YOU TO DO YOUR WORK, TAKE CARE OF THINGS AT HOME, OR GET ALONG WITH OTHER PEOPLE: 3
3. TROUBLE FALLING OR STAYING ASLEEP: 3
2. FEELING DOWN, DEPRESSED OR HOPELESS: 3
8. MOVING OR SPEAKING SO SLOWLY THAT OTHER PEOPLE COULD HAVE NOTICED. OR THE OPPOSITE, BEING SO FIGETY OR RESTLESS THAT YOU HAVE BEEN MOVING AROUND A LOT MORE THAN USUAL: 0
SUM OF ALL RESPONSES TO PHQ QUESTIONS 1-9: 19
6. FEELING BAD ABOUT YOURSELF - OR THAT YOU ARE A FAILURE OR HAVE LET YOURSELF OR YOUR FAMILY DOWN: 1
9. THOUGHTS THAT YOU WOULD BE BETTER OFF DEAD, OR OF HURTING YOURSELF: 0
1. LITTLE INTEREST OR PLEASURE IN DOING THINGS: 3
7. TROUBLE CONCENTRATING ON THINGS, SUCH AS READING THE NEWSPAPER OR WATCHING TELEVISION: 3

## 2023-07-05 ASSESSMENT — LIFESTYLE VARIABLES: HOW OFTEN DO YOU HAVE A DRINK CONTAINING ALCOHOL: NEVER

## 2023-07-05 NOTE — PROGRESS NOTES
Medicare Annual Wellness Visit    Montez Aponte is here for Medicare AWV    Assessment & Plan   Medicare annual wellness visit, subsequent  Stage 3 chronic kidney disease, unspecified whether stage 3a or 3b CKD ([address])  Chronic obstructive pulmonary disease, unspecified COPD type ([address])  Type 2 diabetes mellitus with stage 3 chronic kidney disease, unspecified whether long term insulin use, unspecified whether stage 3a or 3b CKD ([address])  -     Comprehensive Metabolic Panel; Future  -     CBC; Future  -     Hemoglobin A1C; Future  Weight loss  -     TSH; Future  -     External Referral To Home Health  Poor mobility  -     External Referral To Home Health  Frequent falls  -     External Referral To Home Health  Vitamin D deficiency  -     Vitamin D 25 Hydroxy; Future  Vitamin B deficiency  -     Vitamin B12 & Folate; Future  Severe episode of recurrent major depressive disorder, without psychotic features ([address])  -     escitalopram (LEXAPRO) 10 MG tablet; Take 1 tablet by mouth daily, Disp-90 tablet, R-1Normal    Recommendations for Preventive Services Due: see orders and patient instructions/AVS.  Recommended screening schedule for the next 5-10 years is provided to the patient in written form: see Patient Instructions/AVS.     No follow-ups on file. Subjective   The following acute and/or chronic problems were also addressed today:  Weight loss, immobility,  thoracici and side pain. Patient's complete Health Risk Assessment and screening values have been reviewed and are found in Flowsheets. The following problems were reviewed today and where indicated follow up appointments were made and/or referrals ordered. Positive Risk Factor Screenings with Interventions:    Fall Risk:  Do you feel unsteady or are you worried about falling? : (!) yes  2 or more falls in past year?: (!) yes  Fall with injury in past year?: no     Interventions: Will send physical thereapy     Cognitive:    Words recalled: 3 Words

## 2023-07-05 NOTE — PROGRESS NOTES
Chief Complaint   Patient presents with    Medicare AWV       Have you seen any other physician or provider since your last visit yes - Pulmonology,Cardiology    Have you had any other diagnostic tests since your last visit? no    Have you changed or stopped any medications since your last visit? no     I have recommended that this patient have a immunization for pneumonia but he due to refusal reason: not comfortable with test   I have discussed the risks and benefits of this examination with him. The patient verbalizes understanding. Provider will be informed of refusal.      Diabetic retinal exam completed this year? No                       * If yes please have patient sign a records release to obtain record to update Health Maintenance                       * If no, please order referral for patient to be scheduled     Patient is her for his AWV.

## 2023-07-06 ENCOUNTER — TELEPHONE (OUTPATIENT)
Dept: PRIMARY CARE CLINIC | Age: 83
End: 2023-07-06

## 2023-07-06 LAB
25(OH)D3 SERPL-MCNC: 30.5 NG/ML (ref 32–100)
ALBUMIN SERPL-MCNC: 3.9 G/DL (ref 3.4–4.8)
ALBUMIN/GLOB SERPL: 1.9 {RATIO} (ref 0.8–2)
ALP SERPL-CCNC: 73 U/L (ref 25–100)
ALT SERPL-CCNC: 8 U/L (ref 4–36)
ANION GAP SERPL CALCULATED.3IONS-SCNC: 10 MMOL/L (ref 3–16)
AST SERPL-CCNC: 12 U/L (ref 8–33)
BILIRUB SERPL-MCNC: 0.7 MG/DL (ref 0.3–1.2)
BUN SERPL-MCNC: 24 MG/DL (ref 6–20)
CALCIUM SERPL-MCNC: 10.8 MG/DL (ref 8.5–10.5)
CHLORIDE SERPL-SCNC: 106 MMOL/L (ref 98–107)
CO2 SERPL-SCNC: 24 MMOL/L (ref 20–30)
CREAT SERPL-MCNC: 1.6 MG/DL (ref 0.4–1.2)
ERYTHROCYTE [DISTWIDTH] IN BLOOD BY AUTOMATED COUNT: 13.8 % (ref 11–16)
FOLATE SERPL-MCNC: 7.08 NG/ML
GFR SERPLBLD CREATININE-BSD FMLA CKD-EPI: 43 ML/MIN/{1.73_M2}
GLOBULIN SER CALC-MCNC: 2.1 G/DL
GLUCOSE SERPL-MCNC: 120 MG/DL (ref 74–106)
HBA1C MFR BLD: 5.6 %
HCT VFR BLD AUTO: 45.6 % (ref 40–54)
HGB BLD-MCNC: 15.3 G/DL (ref 13–18)
MCH RBC QN AUTO: 29.7 PG (ref 27–32)
MCHC RBC AUTO-ENTMCNC: 33.6 G/DL (ref 31–35)
MCV RBC AUTO: 88.4 FL (ref 80–100)
PLATELET # BLD AUTO: 190 K/UL (ref 150–400)
PMV BLD AUTO: 10.7 FL (ref 6–10)
POTASSIUM SERPL-SCNC: 3.5 MMOL/L (ref 3.4–5.1)
PROT SERPL-MCNC: 6 G/DL (ref 6.4–8.3)
RBC # BLD AUTO: 5.16 M/UL (ref 4.5–6)
SODIUM SERPL-SCNC: 140 MMOL/L (ref 136–145)
TSH SERPL DL<=0.005 MIU/L-ACNC: 0.86 UIU/ML (ref 0.27–4.2)
VIT B12 SERPL-MCNC: 328 PG/ML (ref 211–911)
WBC # BLD AUTO: 9.3 K/UL (ref 4–11)

## 2023-07-06 NOTE — TELEPHONE ENCOUNTER
Camila Guerin called from [facility] and they where going to take Jearl as a patient but can't due to him being in [place]. She called to see if [facility] could take him and they couldn't not accept him either. She wanted to let us know that we would need to find another company for him.

## 2023-07-14 ENCOUNTER — TELEPHONE (OUTPATIENT)
Dept: PRIMARY CARE CLINIC | Age: 83
End: 2023-07-14

## 2023-07-24 ENCOUNTER — OFFICE VISIT (OUTPATIENT)
Dept: PULMONOLOGY | Facility: CLINIC | Age: 83
End: 2023-07-24
Payer: MEDICARE

## 2023-07-24 VITALS
HEIGHT: 64 IN | RESPIRATION RATE: 18 BRPM | DIASTOLIC BLOOD PRESSURE: 62 MMHG | BODY MASS INDEX: 25.44 KG/M2 | WEIGHT: 149 LBS | OXYGEN SATURATION: 92 % | SYSTOLIC BLOOD PRESSURE: 98 MMHG | HEART RATE: 53 BPM

## 2023-07-24 DIAGNOSIS — J44.9 CHRONIC OBSTRUCTIVE PULMONARY DISEASE, UNSPECIFIED COPD TYPE: Primary | ICD-10-CM

## 2023-07-24 DIAGNOSIS — J30.89 OTHER ALLERGIC RHINITIS: ICD-10-CM

## 2023-07-24 DIAGNOSIS — F17.210 NICOTINE DEPENDENCE, CIGARETTES, UNCOMPLICATED: ICD-10-CM

## 2023-07-24 PROCEDURE — 99214 OFFICE O/P EST MOD 30 MIN: CPT | Performed by: INTERNAL MEDICINE

## 2023-07-24 PROCEDURE — 3078F DIAST BP <80 MM HG: CPT | Performed by: INTERNAL MEDICINE

## 2023-07-24 PROCEDURE — 3074F SYST BP LT 130 MM HG: CPT | Performed by: INTERNAL MEDICINE

## 2023-07-24 RX ORDER — AZELASTINE 1 MG/ML
1 SPRAY, METERED NASAL 2 TIMES DAILY PRN
Qty: 1 EACH | Refills: 5 | Status: SHIPPED | OUTPATIENT
Start: 2023-07-24

## 2023-07-24 RX ORDER — FLUTICASONE PROPIONATE 50 MCG
1 SPRAY, SUSPENSION (ML) NASAL DAILY
Qty: 16 G | Refills: 5 | Status: SHIPPED | OUTPATIENT
Start: 2023-07-24

## 2023-07-24 RX ORDER — IPRATROPIUM BROMIDE AND ALBUTEROL SULFATE 2.5; .5 MG/3ML; MG/3ML
3 SOLUTION RESPIRATORY (INHALATION) 4 TIMES DAILY PRN
Qty: 360 ML | Refills: 5 | Status: SHIPPED | OUTPATIENT
Start: 2023-07-24

## 2023-07-24 NOTE — PROGRESS NOTES
"  Chief Complaint   Patient presents with    Breathing Problem    Follow-up       Subjective   Eduar Ashton is a 83 y.o. male.   Patient was evaluated today for follow up of shortness of breath, allergic rhinitis and COPD.     Patient says that his symptoms have been stable since the last clinic visit. he reports no recent exacerbations.     Patient is using Stiolto, as prescribed. Exercise tolerance has also remained stable but limited due to multiple issues including back pain and \"balance issues\".     Continues to smoke 1/2 pack per day.    Patient says that he has been using his nasal sprays on a regular basis and describes no significant ongoing issues other than occasional congestion.       The following portions of the patient's history were reviewed and updated as appropriate: allergies, current medications, past family history, past medical history, past social history, and past surgical history.    Review of Systems   HENT:  Negative for sneezing.    Respiratory:  Positive for chest tightness and shortness of breath.    Psychiatric/Behavioral:  Positive for sleep disturbance.      Objective   Visit Vitals  BP 98/62   Pulse 53   Resp 18   Ht 162.6 cm (64\")   Wt 67.6 kg (149 lb)   SpO2 92%   BMI 25.58 kg/m²       Physical Exam  Vitals reviewed.   Constitutional:       Appearance: He is well-developed.   HENT:      Head: Normocephalic and atraumatic.   Eyes:      Extraocular Movements: Extraocular movements intact.   Cardiovascular:      Rate and Rhythm: Normal rate.   Pulmonary:      Comments: Somewhat hyperresonant to percussion.  Somewhat decreased air entry.  No obvious wheezing noted.   Musculoskeletal:      Cervical back: Neck supple.      Comments: Used a walker.    Neurological:      Mental Status: He is alert.         Assessment & Plan   Diagnoses and all orders for this visit:    1. Chronic obstructive pulmonary disease, unspecified COPD type (Primary)    2. Nicotine dependence, cigarettes, " uncomplicated    3. Other allergic rhinitis    Other orders  -     tiotropium bromide-olodaterol (STIOLTO RESPIMAT) 2.5-2.5 MCG/ACT aerosol solution inhaler; Inhale 2 puffs Daily.  Dispense: 1 each; Refill: 5  -     azelastine (ASTELIN) 0.1 % nasal spray; 1 spray into the nostril(s) as directed by provider 2 (Two) Times a Day As Needed for Rhinitis or Allergies. Use in each nostril as directed  Dispense: 1 each; Refill: 5  -     fluticasone (FLONASE) 50 MCG/ACT nasal spray; 1 spray into the nostril(s) as directed by provider Daily. Administer 1 spray in each nostril for each dose.  Dispense: 16 g; Refill: 5  -     ipratropium-albuterol (DUO-NEB) 0.5-2.5 mg/3 ml nebulizer; Take 3 mL by nebulization 4 (Four) Times a Day As Needed for Wheezing or Shortness of Air.  Dispense: 360 mL; Refill: 5           Return in about 7 months (around 2/20/2024) for Recheck, For Mohan ([place]).    DISCUSSION (if any):  Last CT scan results was reviewed in great detail with the patient.  1.  Small left greater than right pleural effusions with associated mild bilateral dependent atelectasis. No airspace consolidation.  2.  Small pericardial effusion.  3.  Mild ectasia of the ascending thoracic aorta measuring 4 cm.  4.  Extensive coronary artery calcifications.    Latest available PFTs were reviewed.  Consistent with moderate obstruction.     We have reviewed his pulmonary medications in great detail.    Compliance with medications stressed.     Side effects of prescribed medications discussed with the patient    Patient was strongly encouraged to quit smoking as soon as possible.    Patient was advised to continue his nasal spray, especially given improvement in symptoms overall.    I discussed the need for influenza & pneumonia vaccination, but he refused vaccination today.  I have asked him to discuss this further with his PCP.      Dictated utilizing Dragon dictation.    This document was electronically signed by Shawnee RUELAS  MD Nayana on 07/24/23 at 11:21 EDT

## 2023-07-31 DIAGNOSIS — I10 BENIGN ESSENTIAL HTN: ICD-10-CM

## 2023-08-02 RX ORDER — LOSARTAN POTASSIUM 50 MG/1
50 TABLET ORAL DAILY
Qty: 30 TABLET | Refills: 3 | Status: SHIPPED | OUTPATIENT
Start: 2023-08-02

## 2023-08-05 DIAGNOSIS — N18.30 STAGE 3 CHRONIC KIDNEY DISEASE, UNSPECIFIED WHETHER STAGE 3A OR 3B CKD (HCC): ICD-10-CM

## 2023-08-07 RX ORDER — TAMSULOSIN HYDROCHLORIDE 0.4 MG/1
CAPSULE ORAL
Qty: 30 CAPSULE | Refills: 3 | Status: SHIPPED | OUTPATIENT
Start: 2023-08-07

## 2023-08-08 ENCOUNTER — OFFICE VISIT (OUTPATIENT)
Dept: PRIMARY CARE CLINIC | Age: 83
End: 2023-08-08
Payer: MEDICARE

## 2023-08-08 VITALS
HEART RATE: 63 BPM | OXYGEN SATURATION: 96 % | WEIGHT: 150.8 LBS | HEIGHT: 68 IN | SYSTOLIC BLOOD PRESSURE: 139 MMHG | TEMPERATURE: 97.7 F | DIASTOLIC BLOOD PRESSURE: 81 MMHG | BODY MASS INDEX: 22.85 KG/M2 | RESPIRATION RATE: 16 BRPM

## 2023-08-08 DIAGNOSIS — E11.22 TYPE 2 DIABETES MELLITUS WITH STAGE 3 CHRONIC KIDNEY DISEASE, UNSPECIFIED WHETHER LONG TERM INSULIN USE, UNSPECIFIED WHETHER STAGE 3A OR 3B CKD (HCC): ICD-10-CM

## 2023-08-08 DIAGNOSIS — F03.918 DEMENTIA WITH BEHAVIORAL DISTURBANCE (HCC): ICD-10-CM

## 2023-08-08 DIAGNOSIS — J44.9 CHRONIC OBSTRUCTIVE PULMONARY DISEASE, UNSPECIFIED COPD TYPE (HCC): ICD-10-CM

## 2023-08-08 DIAGNOSIS — N18.30 TYPE 2 DIABETES MELLITUS WITH STAGE 3 CHRONIC KIDNEY DISEASE, UNSPECIFIED WHETHER LONG TERM INSULIN USE, UNSPECIFIED WHETHER STAGE 3A OR 3B CKD (HCC): ICD-10-CM

## 2023-08-08 DIAGNOSIS — I10 BENIGN ESSENTIAL HTN: ICD-10-CM

## 2023-08-08 DIAGNOSIS — R53.1 GENERALIZED WEAKNESS: Primary | ICD-10-CM

## 2023-08-08 PROCEDURE — G8427 DOCREV CUR MEDS BY ELIG CLIN: HCPCS | Performed by: NURSE PRACTITIONER

## 2023-08-08 PROCEDURE — 1123F ACP DISCUSS/DSCN MKR DOCD: CPT | Performed by: NURSE PRACTITIONER

## 2023-08-08 PROCEDURE — 99214 OFFICE O/P EST MOD 30 MIN: CPT | Performed by: NURSE PRACTITIONER

## 2023-08-08 PROCEDURE — 3078F DIAST BP <80 MM HG: CPT | Performed by: NURSE PRACTITIONER

## 2023-08-08 PROCEDURE — 3044F HG A1C LEVEL LT 7.0%: CPT | Performed by: NURSE PRACTITIONER

## 2023-08-08 PROCEDURE — 3023F SPIROM DOC REV: CPT | Performed by: NURSE PRACTITIONER

## 2023-08-08 PROCEDURE — 4004F PT TOBACCO SCREEN RCVD TLK: CPT | Performed by: NURSE PRACTITIONER

## 2023-08-08 PROCEDURE — 96372 THER/PROPH/DIAG INJ SC/IM: CPT | Performed by: NURSE PRACTITIONER

## 2023-08-08 PROCEDURE — 3074F SYST BP LT 130 MM HG: CPT | Performed by: NURSE PRACTITIONER

## 2023-08-08 PROCEDURE — G8420 CALC BMI NORM PARAMETERS: HCPCS | Performed by: NURSE PRACTITIONER

## 2023-08-08 RX ORDER — CYANOCOBALAMIN 1000 UG/ML
1000 INJECTION, SOLUTION INTRAMUSCULAR; SUBCUTANEOUS ONCE
Status: COMPLETED | OUTPATIENT
Start: 2023-08-08 | End: 2023-08-08

## 2023-08-08 RX ADMIN — CYANOCOBALAMIN 1000 MCG: 1000 INJECTION, SOLUTION INTRAMUSCULAR; SUBCUTANEOUS at 15:51

## 2023-08-08 ASSESSMENT — ENCOUNTER SYMPTOMS
ALLERGIC/IMMUNOLOGIC NEGATIVE: 1
GASTROINTESTINAL NEGATIVE: 1
RESPIRATORY NEGATIVE: 1
EYES NEGATIVE: 1

## 2023-08-08 NOTE — PROGRESS NOTES
Administrations This Visit       cyanocobalamin injection 1,000 mcg       Admin Date  08/08/2023  15:51 Action  Given Dose  1,000 mcg Route  IntraMUSCular Site  Deltoid Left Administered By  Barney Bridges    Ordering Provider: COURT Chang    NDC: 14732-920-82    Lot#: [id number]    : [address]    Patient Supplied?: No
kidney disease, unspecified whether long term insulin use, unspecified whether stage 3a or 3b CKD (HCC)  Januvia 100 mg  Hemoglobin A1C   Date Value Ref Range Status   07/05/2023 5.6 See comment % Final     Comment:     Comment:  Diagnosis of Diabetes: > or = 6.5%  Increased risk of diabetes (Prediabetes): 5.7-6.4%  Glycemic Control: Nonpregnant Adults: <7.0%                    Pregnant: <6.0%             4. Chronic obstructive pulmonary disease, unspecified COPD type ([address])  Advised to stop smoking continue inhalers.      5. Dementia with behavioral disturbance (HCC)  Aricept 10 mg  Seroquel 25 mg  Lexapro 10 mg       Written by Stacey VALLADARES, acting as a scribe for [employer] on 8/8/2023 at 12:56 AM.

## 2023-08-11 DIAGNOSIS — I10 BENIGN ESSENTIAL HTN: Primary | ICD-10-CM

## 2023-08-11 DIAGNOSIS — I49.9 IRREGULAR HEART RATE: ICD-10-CM

## 2023-08-11 DIAGNOSIS — F91.9 BEHAVIOR DISTURBANCE: ICD-10-CM

## 2023-08-11 RX ORDER — QUETIAPINE FUMARATE 25 MG/1
TABLET, FILM COATED ORAL
Qty: 60 TABLET | Refills: 1 | Status: SHIPPED | OUTPATIENT
Start: 2023-08-11

## 2023-08-21 ENCOUNTER — APPOINTMENT (OUTPATIENT)
Dept: CT IMAGING | Facility: HOSPITAL | Age: 83
End: 2023-08-21
Attending: FAMILY MEDICINE
Payer: MEDICARE

## 2023-08-21 ENCOUNTER — APPOINTMENT (OUTPATIENT)
Dept: GENERAL RADIOLOGY | Facility: HOSPITAL | Age: 83
End: 2023-08-21
Attending: FAMILY MEDICINE
Payer: MEDICARE

## 2023-08-21 ENCOUNTER — HOSPITAL ENCOUNTER (EMERGENCY)
Facility: HOSPITAL | Age: 83
Discharge: HOME | End: 2023-08-21
Attending: FAMILY MEDICINE
Payer: MEDICARE

## 2023-08-21 VITALS
TEMPERATURE: 98.2 F | SYSTOLIC BLOOD PRESSURE: 142 MMHG | BODY MASS INDEX: 20.32 KG/M2 | DIASTOLIC BLOOD PRESSURE: 69 MMHG | HEART RATE: 76 BPM | RESPIRATION RATE: 20 BRPM | OXYGEN SATURATION: 98 % | WEIGHT: 150 LBS | HEIGHT: 72 IN
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
## 2023-08-21 DIAGNOSIS — M54.2 NECK PAIN: ICD-10-CM

## 2023-08-21 DIAGNOSIS — S20.212A RIB CONTUSION, LEFT, INITIAL ENCOUNTER: ICD-10-CM

## 2023-08-21 DIAGNOSIS — S40.012A CONTUSION OF LEFT SHOULDER, INITIAL ENCOUNTER: ICD-10-CM

## 2023-08-21 LAB
ALBUMIN SERPL-MCNC: 3.5 G/DL (ref 3.4–4.8)
ALBUMIN/GLOB SERPL: 1.6 {RATIO} (ref 0.8–2)
ALP SERPL-CCNC: 64 U/L (ref 25–100)
ALT SERPL-CCNC: 9 U/L (ref 4–36)
ANION GAP SERPL CALCULATED.3IONS-SCNC: 10 MMOL/L (ref 3–16)
AST SERPL-CCNC: 15 U/L (ref 8–33)
BASOPHILS # BLD: 0.1 K/UL (ref 0–0.1)
BASOPHILS NFR BLD: 0.6 %
BILIRUB SERPL-MCNC: 0.6 MG/DL (ref 0.3–1.2)
BILIRUB UR QL STRIP.AUTO: NEGATIVE
BUN SERPL-MCNC: 14 MG/DL (ref 6–20)
CALCIUM SERPL-MCNC: 10.4 MG/DL (ref 8.5–10.5)
CHLORIDE SERPL-SCNC: 109 MMOL/L (ref 98–107)
CLARITY UR: CLEAR
CO2 SERPL-SCNC: 21 MMOL/L (ref 20–30)
COLOR UR: YELLOW
CREAT SERPL-MCNC: 1.2 MG/DL (ref 0.4–1.2)
EOSINOPHIL # BLD: 0.1 K/UL (ref 0–0.4)
EOSINOPHIL NFR BLD: 1.1 %
EPI CELLS #/AREA URNS HPF: ABNORMAL /HPF (ref 0–5)
ERYTHROCYTE [DISTWIDTH] IN BLOOD BY AUTOMATED COUNT: 14.9 % (ref 11–16)
GFR SERPLBLD CREATININE-BSD FMLA CKD-EPI: >60 ML/MIN/{1.73_M2}
GLOBULIN SER CALC-MCNC: 2.2 G/DL
GLUCOSE SERPL-MCNC: 129 MG/DL (ref 74–106)
GLUCOSE UR STRIP.AUTO-MCNC: NEGATIVE MG/DL
HCT VFR BLD AUTO: 40.7 % (ref 40–54)
HGB BLD-MCNC: 13.3 G/DL (ref 13–18)
HGB UR QL STRIP.AUTO: NEGATIVE
IMM GRANULOCYTES # BLD: 0.1 K/UL
IMM GRANULOCYTES NFR BLD: 0.5 % (ref 0–5)
KETONES UR STRIP.AUTO-MCNC: NEGATIVE MG/DL
LEUKOCYTE ESTERASE UR QL STRIP.AUTO: NEGATIVE
LYMPHOCYTES # BLD: 1.8 K/UL (ref 1.5–4)
LYMPHOCYTES NFR BLD: 16.8 %
MAGNESIUM SERPL-MCNC: 1.8 MG/DL (ref 1.7–2.4)
MCH RBC QN AUTO: 30 PG (ref 27–32)
MCHC RBC AUTO-ENTMCNC: 32.7 G/DL (ref 31–35)
MCV RBC AUTO: 91.7 FL (ref 80–100)
MONOCYTES # BLD: 1 K/UL (ref 0.2–0.8)
MONOCYTES NFR BLD: 9.1 %
NEUTROPHILS # BLD: 7.8 K/UL (ref 2–7.5)
NEUTS SEG NFR BLD: 71.9 %
NITRITE UR QL STRIP.AUTO: NEGATIVE
PH UR STRIP.AUTO: 5.5 [PH] (ref 5–8)
PLATELET # BLD AUTO: 178 K/UL (ref 150–400)
PMV BLD AUTO: 9.7 FL (ref 6–10)
POTASSIUM SERPL-SCNC: 3.7 MMOL/L (ref 3.4–5.1)
PROT SERPL-MCNC: 5.7 G/DL (ref 6.4–8.3)
PROT UR STRIP.AUTO-MCNC: 30 MG/DL
RBC # BLD AUTO: 4.44 M/UL (ref 4.5–6)
RBC #/AREA URNS HPF: ABNORMAL /HPF (ref 0–4)
SODIUM SERPL-SCNC: 140 MMOL/L (ref 136–145)
SP GR UR STRIP.AUTO: 1.02 (ref 1–1.03)
SPERM, URINE: ABNORMAL /HPF
UA COMPLETE W REFLEX CULTURE PNL UR: ABNORMAL
UA DIPSTICK W REFLEX MICRO PNL UR: YES
URN SPEC COLLECT METH UR: ABNORMAL
UROBILINOGEN UR STRIP-ACNC: 1 E.U./DL
WBC # BLD AUTO: 10.8 K/UL (ref 4–11)
WBC #/AREA URNS HPF: ABNORMAL /HPF (ref 0–5)

## 2023-08-21 PROCEDURE — 6370000000 HC RX 637 (ALT 250 FOR IP): Performed by: FAMILY MEDICINE

## 2023-08-21 PROCEDURE — 81001 URINALYSIS AUTO W/SCOPE: CPT

## 2023-08-21 PROCEDURE — 99285 EMERGENCY DEPT VISIT HI MDM: CPT

## 2023-08-21 PROCEDURE — 70450 CT HEAD/BRAIN W/O DYE: CPT

## 2023-08-21 PROCEDURE — 94761 N-INVAS EAR/PLS OXIMETRY MLT: CPT

## 2023-08-21 PROCEDURE — 73030 X-RAY EXAM OF SHOULDER: CPT

## 2023-08-21 PROCEDURE — 71250 CT THORAX DX C-: CPT

## 2023-08-21 PROCEDURE — 85025 COMPLETE CBC W/AUTO DIFF WBC: CPT

## 2023-08-21 PROCEDURE — 83735 ASSAY OF MAGNESIUM: CPT

## 2023-08-21 PROCEDURE — 2580000003 HC RX 258: Performed by: FAMILY MEDICINE

## 2023-08-21 PROCEDURE — 36415 COLL VENOUS BLD VENIPUNCTURE: CPT

## 2023-08-21 PROCEDURE — 72125 CT NECK SPINE W/O DYE: CPT

## 2023-08-21 PROCEDURE — 80053 COMPREHEN METABOLIC PANEL: CPT

## 2023-08-21 RX ORDER — 0.9 % SODIUM CHLORIDE 0.9 %
1000 INTRAVENOUS SOLUTION INTRAVENOUS ONCE
Status: COMPLETED | OUTPATIENT
Start: 2023-08-21 | End: 2023-08-21

## 2023-08-21 RX ORDER — ACETAMINOPHEN 500 MG
1000 TABLET ORAL
Status: COMPLETED | OUTPATIENT
Start: 2023-08-21 | End: 2023-08-21

## 2023-08-21 RX ORDER — DONEPEZIL HYDROCHLORIDE 10 MG/1
TABLET, FILM COATED ORAL
Qty: 90 TABLET | Refills: 1 | Status: SHIPPED | OUTPATIENT
Start: 2023-08-21

## 2023-08-21 RX ADMIN — SODIUM CHLORIDE 1000 ML: 9 INJECTION, SOLUTION INTRAVENOUS at 11:44

## 2023-08-21 RX ADMIN — ACETAMINOPHEN 1000 MG: 500 TABLET ORAL at 11:41

## 2023-08-21 ASSESSMENT — PAIN DESCRIPTION - PAIN TYPE: TYPE: ACUTE PAIN

## 2023-08-21 ASSESSMENT — PAIN DESCRIPTION - ORIENTATION: ORIENTATION: LEFT

## 2023-08-21 ASSESSMENT — PAIN DESCRIPTION - ONSET: ONSET: SUDDEN

## 2023-08-21 ASSESSMENT — PAIN - FUNCTIONAL ASSESSMENT
PAIN_FUNCTIONAL_ASSESSMENT: ACTIVITIES ARE NOT PREVENTED
PAIN_FUNCTIONAL_ASSESSMENT: 0-10

## 2023-08-21 ASSESSMENT — PAIN DESCRIPTION - FREQUENCY: FREQUENCY: CONTINUOUS

## 2023-08-21 ASSESSMENT — PAIN DESCRIPTION - DESCRIPTORS: DESCRIPTORS: ACHING

## 2023-08-21 ASSESSMENT — PAIN DESCRIPTION - LOCATION: LOCATION: SHOULDER

## 2023-08-21 NOTE — ED NOTES
Pt/family given d/c orders verbally and written. Pt/Family with no questions or concerns r/t d/c. Pt ambulatory to POV. No acute distress noted on d/c.       Emani Gerber RN  08/21/23 0625

## 2023-08-21 NOTE — ED PROVIDER NOTES
[address] ENCOUNTER        Pt Name: Nely Rasheed  MRN: [id number]  [address] [facility] [birth year]  Date of evaluation: 8/21/2023  Provider: Yadi Lozoya MD  PCP: Margaretann Cooks, APRN  Note Started: 10:57 AM EDT 8/21/23    CHIEF COMPLAINT       Chief Complaint   Patient presents with    Shoulder Pain     Pt states he fell yesterday. Pt landed on lt side when he fell. Pt tripped over something in the floor. HISTORY OF PRESENT ILLNESS: 1 or more Elements     History from : Patient and Family      Limitations to history : None    Sadia Ellsworth is a 81 y.o. male who presents to the emerged department after he fell yesterday evening after trying to walk without his walker. Patient states he landed on his left side. Denies any significant head injury no loss of consciousness. Denies any headache no visual changes no focal neurological deficits. Patient complaining left shoulder pain mainly also having left-sided neck pain and left upper rib cage pain. Pain is a 7 out of 10 intensity. No treatments have been tried. Bruising to the left upper ribs and shoulder area. No deformity no signs of neurovascular compromise. Denies any dizziness no chest pain no palpitations no shortness of breath. Denies any fevers or chills. No dysuria hematuria flank pain. Nursing Notes were all reviewed and agreed with or any disagreements were addressed in the HPI.     REVIEW OF SYSTEMS :      Review of Systems    All systems reviewed and negative except as in HPI/MDM    SURGICAL HISTORY     Past Surgical History:   Procedure Laterality Date    CYST REMOVAL      under right arm     EYE SURGERY  01/2012    cataract    FOOT SURGERY      growth removed; left       CURRENTMEDICATIONS       Previous Medications    ALBUTEROL SULFATE HFA (PROVENTIL;VENTOLIN;PROAIR) 108 (90 BASE) MCG/ACT INHALER        ASPIRIN 81 MG CHEWABLE TABLET    Take 1 tablet by mouth daily    AZELASTINE

## 2023-08-22 NOTE — CARE COORDINATION
Spoke with pt's wife, he is doing good, getting around the house ok and resting. Home health nurses will be there to check on him today.

## 2023-10-30 ENCOUNTER — TELEPHONE (OUTPATIENT)
Dept: PRIMARY CARE CLINIC | Age: 83
End: 2023-10-30

## 2023-10-30 NOTE — TELEPHONE ENCOUNTER
Jennifer Pedroza called from [facility] stating that patient has met his goal with physical therapy and will be discharged.

## 2023-11-29 ENCOUNTER — APPOINTMENT (OUTPATIENT)
Dept: GENERAL RADIOLOGY | Facility: HOSPITAL | Age: 83
End: 2023-11-29
Attending: HOSPITALIST
Payer: MEDICARE

## 2023-11-29 ENCOUNTER — APPOINTMENT (OUTPATIENT)
Dept: CT IMAGING | Facility: HOSPITAL | Age: 83
End: 2023-11-29
Attending: HOSPITALIST
Payer: MEDICARE

## 2023-11-29 ENCOUNTER — HOSPITAL ENCOUNTER (OUTPATIENT)
Facility: HOSPITAL | Age: 83
Setting detail: OBSERVATION
Discharge: HOME | End: 2023-12-02
Attending: HOSPITALIST | Admitting: INTERNAL MEDICINE
Payer: MEDICARE
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
## 2023-11-29 DIAGNOSIS — R53.1 GENERAL WEAKNESS: ICD-10-CM
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
## 2023-11-29 LAB
ALBUMIN SERPL-MCNC: 3.9 G/DL (ref 3.4–4.8)
ALBUMIN/GLOB SERPL: 1.9 {RATIO} (ref 0.8–2)
ALP SERPL-CCNC: 68 U/L (ref 25–100)
ALT SERPL-CCNC: 9 U/L (ref 4–36)
ANION GAP SERPL CALCULATED.3IONS-SCNC: 11 MMOL/L (ref 3–16)
AST SERPL-CCNC: 14 U/L (ref 8–33)
BASOPHILS # BLD: 0.1 K/UL (ref 0–0.1)
BASOPHILS NFR BLD: 0.8 %
BILIRUB SERPL-MCNC: 0.7 MG/DL (ref 0.3–1.2)
BILIRUB UR QL STRIP.AUTO: ABNORMAL
BUN SERPL-MCNC: 18 MG/DL (ref 6–20)
CALCIUM SERPL-MCNC: 11.1 MG/DL (ref 8.5–10.5)
CHLORIDE SERPL-SCNC: 106 MMOL/L (ref 98–107)
CLARITY UR: ABNORMAL
CO2 SERPL-SCNC: 25 MMOL/L (ref 20–30)
COLOR UR: ABNORMAL
CREAT SERPL-MCNC: 1.5 MG/DL (ref 0.4–1.2)
EOSINOPHIL # BLD: 0.2 K/UL (ref 0–0.4)
EOSINOPHIL NFR BLD: 2.4 %
EPI CELLS #/AREA URNS HPF: ABNORMAL /HPF (ref 0–5)
ERYTHROCYTE [DISTWIDTH] IN BLOOD BY AUTOMATED COUNT: 14.2 % (ref 11–16)
FLUAV AG NPH QL: NEGATIVE
FLUBV AG NPH QL: NEGATIVE
GFR SERPLBLD CREATININE-BSD FMLA CKD-EPI: 46 ML/MIN/{1.73_M2}
GLOBULIN SER CALC-MCNC: 2.1 G/DL
GLUCOSE BLD-MCNC: 116 MG/DL (ref 74–106)
GLUCOSE BLD-MCNC: 207 MG/DL (ref 74–106)
GLUCOSE SERPL-MCNC: 104 MG/DL (ref 74–106)
GLUCOSE UR STRIP.AUTO-MCNC: NEGATIVE MG/DL
HCT VFR BLD AUTO: 41.9 % (ref 40–54)
HGB BLD-MCNC: 14 G/DL (ref 13–18)
HGB UR QL STRIP.AUTO: ABNORMAL
IMM GRANULOCYTES # BLD: 0 K/UL
IMM GRANULOCYTES NFR BLD: 0.3 % (ref 0–5)
KETONES UR STRIP.AUTO-MCNC: NEGATIVE MG/DL
LEUKOCYTE ESTERASE UR QL STRIP.AUTO: NEGATIVE
LYMPHOCYTES # BLD: 1.3 K/UL (ref 1.5–4)
LYMPHOCYTES NFR BLD: 17 %
MAGNESIUM SERPL-MCNC: 2 MG/DL (ref 1.7–2.4)
MCH RBC QN AUTO: 30.4 PG (ref 27–32)
MCHC RBC AUTO-ENTMCNC: 33.4 G/DL (ref 31–35)
MCV RBC AUTO: 90.9 FL (ref 80–100)
MONOCYTES # BLD: 0.5 K/UL (ref 0.2–0.8)
MONOCYTES NFR BLD: 7.1 %
NEUTROPHILS # BLD: 5.5 K/UL (ref 2–7.5)
NEUTS SEG NFR BLD: 72.4 %
NITRITE UR QL STRIP.AUTO: NEGATIVE
PERFORMED ON: ABNORMAL
PERFORMED ON: ABNORMAL
PH UR STRIP.AUTO: 6 [PH] (ref 5–8)
PLATELET # BLD AUTO: 195 K/UL (ref 150–400)
PMV BLD AUTO: 9.5 FL (ref 6–10)
POTASSIUM SERPL-SCNC: 3.4 MMOL/L (ref 3.4–5.1)
PROT SERPL-MCNC: 6 G/DL (ref 6.4–8.3)
PROT UR STRIP.AUTO-MCNC: 100 MG/DL
RBC # BLD AUTO: 4.61 M/UL (ref 4.5–6)
RBC #/AREA URNS HPF: >100 /HPF (ref 0–4)
S PYO AG THROAT QL: NEGATIVE
SARS-COV-2 RDRP RESP QL NAA+PROBE: NOT DETECTED
SODIUM SERPL-SCNC: 142 MMOL/L (ref 136–145)
SP GR UR STRIP.AUTO: 1.02 (ref 1–1.03)
UA COMPLETE W REFLEX CULTURE PNL UR: ABNORMAL
UA DIPSTICK W REFLEX MICRO PNL UR: YES
URN SPEC COLLECT METH UR: ABNORMAL
UROBILINOGEN UR STRIP-ACNC: 1 E.U./DL
WBC # BLD AUTO: 7.6 K/UL (ref 4–11)
WBC #/AREA URNS HPF: ABNORMAL /HPF (ref 0–5)

## 2023-11-29 PROCEDURE — 71045 X-RAY EXAM CHEST 1 VIEW: CPT

## 2023-11-29 PROCEDURE — 96372 THER/PROPH/DIAG INJ SC/IM: CPT

## 2023-11-29 PROCEDURE — 6370000000 HC RX 637 (ALT 250 FOR IP): Performed by: PHYSICIAN ASSISTANT

## 2023-11-29 PROCEDURE — 51701 INSERT BLADDER CATHETER: CPT

## 2023-11-29 PROCEDURE — 81001 URINALYSIS AUTO W/SCOPE: CPT

## 2023-11-29 PROCEDURE — 2580000003 HC RX 258: Performed by: HOSPITALIST

## 2023-11-29 PROCEDURE — 74176 CT ABD & PELVIS W/O CONTRAST: CPT

## 2023-11-29 PROCEDURE — 36415 COLL VENOUS BLD VENIPUNCTURE: CPT

## 2023-11-29 PROCEDURE — 6360000002 HC RX W HCPCS: Performed by: PHYSICIAN ASSISTANT

## 2023-11-29 PROCEDURE — 87880 STREP A ASSAY W/OPTIC: CPT

## 2023-11-29 PROCEDURE — 85025 COMPLETE CBC W/AUTO DIFF WBC: CPT

## 2023-11-29 PROCEDURE — 87635 SARS-COV-2 COVID-19 AMP PRB: CPT

## 2023-11-29 PROCEDURE — 80053 COMPREHEN METABOLIC PANEL: CPT

## 2023-11-29 PROCEDURE — 93005 ELECTROCARDIOGRAM TRACING: CPT

## 2023-11-29 PROCEDURE — 99285 EMERGENCY DEPT VISIT HI MDM: CPT

## 2023-11-29 PROCEDURE — G0378 HOSPITAL OBSERVATION PER HR: HCPCS

## 2023-11-29 PROCEDURE — 83735 ASSAY OF MAGNESIUM: CPT

## 2023-11-29 PROCEDURE — 87804 INFLUENZA ASSAY W/OPTIC: CPT

## 2023-11-29 RX ORDER — ESCITALOPRAM OXALATE 10 MG/1
10 TABLET ORAL DAILY
Status: DISCONTINUED | OUTPATIENT
Start: 2023-11-30 | End: 2023-12-02 | Stop reason: HOSPADM

## 2023-11-29 RX ORDER — ENOXAPARIN SODIUM 100 MG/ML
40 INJECTION SUBCUTANEOUS
Status: DISCONTINUED | OUTPATIENT
Start: 2023-11-29 | End: 2023-12-02 | Stop reason: HOSPADM

## 2023-11-29 RX ORDER — FUROSEMIDE 20 MG/1
20 TABLET ORAL DAILY PRN
Status: DISCONTINUED | OUTPATIENT
Start: 2023-11-29 | End: 2023-12-02 | Stop reason: HOSPADM

## 2023-11-29 RX ORDER — DONEPEZIL HYDROCHLORIDE 5 MG/1
10 TABLET, FILM COATED ORAL NIGHTLY
Status: DISCONTINUED | OUTPATIENT
Start: 2023-11-29 | End: 2023-12-02 | Stop reason: HOSPADM

## 2023-11-29 RX ORDER — ALOGLIPTIN 12.5 MG/1
12.5 TABLET, FILM COATED ORAL DAILY
Status: DISCONTINUED | OUTPATIENT
Start: 2023-11-29 | End: 2023-12-02 | Stop reason: HOSPADM

## 2023-11-29 RX ORDER — ASPIRIN 81 MG/1
81 TABLET, CHEWABLE ORAL DAILY
Status: DISCONTINUED | OUTPATIENT
Start: 2023-11-30 | End: 2023-12-02 | Stop reason: HOSPADM

## 2023-11-29 RX ORDER — M-VIT,TX,IRON,MINS/CALC/FOLIC 27MG-0.4MG
1 TABLET ORAL DAILY
Status: DISCONTINUED | OUTPATIENT
Start: 2023-11-30 | End: 2023-12-02 | Stop reason: HOSPADM

## 2023-11-29 RX ORDER — ROSUVASTATIN CALCIUM 20 MG/1
20 TABLET, COATED ORAL
Status: DISCONTINUED | OUTPATIENT
Start: 2023-11-29 | End: 2023-12-02 | Stop reason: HOSPADM

## 2023-11-29 RX ORDER — POLYETHYLENE GLYCOL 3350 17 G/17G
17 POWDER, FOR SOLUTION ORAL DAILY PRN
Status: DISCONTINUED | OUTPATIENT
Start: 2023-11-29 | End: 2023-12-02 | Stop reason: HOSPADM

## 2023-11-29 RX ORDER — PANTOPRAZOLE SODIUM 40 MG/1
40 TABLET, DELAYED RELEASE ORAL
Status: DISCONTINUED | OUTPATIENT
Start: 2023-11-30 | End: 2023-12-02 | Stop reason: HOSPADM

## 2023-11-29 RX ORDER — TAMSULOSIN HYDROCHLORIDE 0.4 MG/1
0.4 CAPSULE ORAL DAILY
Status: DISCONTINUED | OUTPATIENT
Start: 2023-11-30 | End: 2023-12-02 | Stop reason: HOSPADM

## 2023-11-29 RX ORDER — QUETIAPINE FUMARATE 25 MG/1
25 TABLET, FILM COATED ORAL NIGHTLY
Status: DISCONTINUED | OUTPATIENT
Start: 2023-11-29 | End: 2023-12-02 | Stop reason: HOSPADM

## 2023-11-29 RX ORDER — CILOSTAZOL 100 MG/1
100 TABLET ORAL 2 TIMES DAILY
Status: DISCONTINUED | OUTPATIENT
Start: 2023-11-29 | End: 2023-12-02 | Stop reason: HOSPADM

## 2023-11-29 RX ORDER — INSULIN LISPRO 100 [IU]/ML
0-4 INJECTION, SOLUTION INTRAVENOUS; SUBCUTANEOUS
Status: DISCONTINUED | OUTPATIENT
Start: 2023-11-29 | End: 2023-12-02 | Stop reason: HOSPADM

## 2023-11-29 RX ORDER — LOSARTAN POTASSIUM 50 MG/1
50 TABLET ORAL DAILY
Status: DISCONTINUED | OUTPATIENT
Start: 2023-11-30 | End: 2023-12-02 | Stop reason: HOSPADM

## 2023-11-29 RX ORDER — DEXTROSE MONOHYDRATE 100 MG/ML
INJECTION, SOLUTION INTRAVENOUS CONTINUOUS PRN
Status: DISCONTINUED | OUTPATIENT
Start: 2023-11-29 | End: 2023-12-02 | Stop reason: HOSPADM

## 2023-11-29 RX ORDER — FLUTICASONE PROPIONATE 50 MCG
1 SPRAY, SUSPENSION (ML) NASAL DAILY
Status: DISCONTINUED | OUTPATIENT
Start: 2023-11-29 | End: 2023-12-02 | Stop reason: HOSPADM

## 2023-11-29 RX ORDER — INSULIN LISPRO 100 [IU]/ML
0-4 INJECTION, SOLUTION INTRAVENOUS; SUBCUTANEOUS NIGHTLY
Status: DISCONTINUED | OUTPATIENT
Start: 2023-11-29 | End: 2023-12-02 | Stop reason: HOSPADM

## 2023-11-29 RX ORDER — NICOTINE 21 MG/24HR
1 PATCH, TRANSDERMAL 24 HOURS TRANSDERMAL DAILY
Status: DISCONTINUED | OUTPATIENT
Start: 2023-11-29 | End: 2023-12-02 | Stop reason: HOSPADM

## 2023-11-29 RX ORDER — ACETAMINOPHEN 325 MG/1
650 TABLET ORAL EVERY 6 HOURS PRN
Status: DISCONTINUED | OUTPATIENT
Start: 2023-11-29 | End: 2023-12-02 | Stop reason: HOSPADM

## 2023-11-29 RX ORDER — ACETAMINOPHEN 650 MG/1
650 SUPPOSITORY RECTAL EVERY 6 HOURS PRN
Status: DISCONTINUED | OUTPATIENT
Start: 2023-11-29 | End: 2023-12-02 | Stop reason: HOSPADM

## 2023-11-29 RX ORDER — IBUPROFEN 600 MG/1
1 TABLET ORAL PRN
Status: DISCONTINUED | OUTPATIENT
Start: 2023-11-29 | End: 2023-12-02 | Stop reason: HOSPADM

## 2023-11-29 RX ORDER — ONDANSETRON 2 MG/ML
4 INJECTION INTRAMUSCULAR; INTRAVENOUS EVERY 6 HOURS PRN
Status: DISCONTINUED | OUTPATIENT
Start: 2023-11-29 | End: 2023-12-02 | Stop reason: HOSPADM

## 2023-11-29 RX ORDER — AZELASTINE 1 MG/ML
1 SPRAY, METERED NASAL 2 TIMES DAILY
Status: DISCONTINUED | OUTPATIENT
Start: 2023-11-29 | End: 2023-12-02 | Stop reason: HOSPADM

## 2023-11-29 RX ORDER — ERGOCALCIFEROL 1.25 MG/1
50000 CAPSULE ORAL WEEKLY
Status: DISCONTINUED | OUTPATIENT
Start: 2023-11-30 | End: 2023-12-02 | Stop reason: HOSPADM

## 2023-11-29 RX ORDER — 0.9 % SODIUM CHLORIDE 0.9 %
500 INTRAVENOUS SOLUTION INTRAVENOUS ONCE
Status: COMPLETED | OUTPATIENT
Start: 2023-11-29 | End: 2023-11-29

## 2023-11-29 RX ORDER — ONDANSETRON 4 MG/1
4 TABLET, ORALLY DISINTEGRATING ORAL EVERY 8 HOURS PRN
Status: DISCONTINUED | OUTPATIENT
Start: 2023-11-29 | End: 2023-12-02 | Stop reason: HOSPADM

## 2023-11-29 RX ADMIN — DONEPEZIL HYDROCHLORIDE 10 MG: 5 TABLET, FILM COATED ORAL at 21:44

## 2023-11-29 RX ADMIN — ENOXAPARIN SODIUM 40 MG: 100 INJECTION SUBCUTANEOUS at 21:44

## 2023-11-29 RX ADMIN — METOPROLOL TARTRATE 12.5 MG: 25 TABLET, FILM COATED ORAL at 21:46

## 2023-11-29 RX ADMIN — QUETIAPINE FUMARATE 25 MG: 25 TABLET ORAL at 21:46

## 2023-11-29 RX ADMIN — ROSUVASTATIN 20 MG: 20 TABLET, FILM COATED ORAL at 21:43

## 2023-11-29 RX ADMIN — SODIUM CHLORIDE 500 ML: 9 INJECTION, SOLUTION INTRAVENOUS at 10:53

## 2023-11-29 RX ADMIN — CILOSTAZOL 100 MG: 100 TABLET ORAL at 21:43

## 2023-11-29 ASSESSMENT — PAIN SCALES - GENERAL: PAINLEVEL_OUTOF10: 0

## 2023-11-29 ASSESSMENT — LIFESTYLE VARIABLES
HOW OFTEN DO YOU HAVE A DRINK CONTAINING ALCOHOL: NEVER
HOW MANY STANDARD DRINKS CONTAINING ALCOHOL DO YOU HAVE ON A TYPICAL DAY: PATIENT DOES NOT DRINK

## 2023-11-29 ASSESSMENT — PAIN - FUNCTIONAL ASSESSMENT: PAIN_FUNCTIONAL_ASSESSMENT: NONE - DENIES PAIN

## 2023-11-29 NOTE — ED TRIAGE NOTES
Patient to ER with [facility] for fall at home, patient states that he got weak while walking to the car. Patient denies any pain during triage but has had bilateral knee pain, worse in the right. He also complains that he started feeling bad the other day, came on all of a sudden. Has had a cough, soa, nausea and diarrhea. 2 skin tears noted to right arm.

## 2023-11-29 NOTE — ED NOTES
Patient instructed that urine is needed for ua, understanding verbalized. Urinal placed at bedside.      Moe Rayo RN  11/29/23 1969

## 2023-11-29 NOTE — ED PROVIDER NOTES
[address] [facility] ENCOUNTER        Pt Name: Ashok Singer  MRN: [id number]  [address] [facility] [place] [birth year]  Date of evaluation: 11/29/2023  Provider: Donnell Mehta DO  PCP: COURT Ross  Note Started: 9:24 AM EST 11/29/23    CHIEF COMPLAINT       Chief Complaint   Patient presents with    Fall       HISTORY OF PRESENT ILLNESS: 1 or more Elements     History from : Patient    Limitations to history : None    Ashok Singer is a 81 y.o. male who presents to the emergency department for fall. Patient states that for the last couple days he has had a little increased weakness. Patient states that he had went through physical therapy for his knees which he has had chronic problems with. He states his right 1 bothers him more than his left but he finished physical therapy on 10/30/2023 of last month. Patient states he been doing fine afterwards but for the past day or 2 he has just felt sort of \"bad\". Patient states has had some nausea but no vomiting. He feels like he has had some shortness of breath and increased cough. Patient states that he is got 3 vehicles that he never drives but he was in the process of going to a vehicle to come to be evaluated by his doctor today but states that when he got outside he just got weak and his left knee was bothering and caused him to fall. Patient states he ambulates with a walker. Advised that once he got down he was not able to get back up and EMS was called to the scene to help the patient back up and was transported here to the emergency department for evaluation. Patient denies any complaints of pain upon arrival here. He states his knee actually feels better now and he can actually move his lower extremities without any difficulty. Patient states that his symptoms have greatly improved since getting in the ambulance and coming here. Patient denies any headache out of ordinary.   He states he has had some nasal CHEWABLE TABLET    Take 1 tablet by mouth daily    AZELASTINE (ASTELIN) 0.1 % NASAL SPRAY    INSTILL 1 SPRAY INTO THE NOSTRIL TWICE DAILY AS NEEDED FOR RHINITIS OR ALLERGIES    CILOSTAZOL (PLETAL) 100 MG TABLET    Take 1 tablet by mouth 2 times daily    DONEPEZIL (ARICEPT) 10 MG TABLET    TAKE 1 TABLET BY MOUTH EVERY NIGHT AT BEDTIME    ESCITALOPRAM (LEXAPRO) 10 MG TABLET    Take 1 tablet by mouth daily    FLUTICASONE (FLONASE) 50 MCG/ACT NASAL SPRAY    1 spray by Each Nostril route daily    FUROSEMIDE (LASIX) 20 MG TABLET    One po daily for 2 days then a needed. LOSARTAN (COZAAR) 50 MG TABLET    TAKE 1 TABLET BY MOUTH DAILY    METOPROLOL TARTRATE (LOPRESSOR) 25 MG TABLET    Take 0.5 tablets by mouth 2 times daily    MULTIPLE VITAMINS-MINERALS (THERAPEUTIC MULTIVITAMIN-MINERALS) TABLET    Take 1 tablet by mouth daily    OMEPRAZOLE (PRILOSEC) 20 MG DELAYED RELEASE CAPSULE    Take 1 capsule by mouth daily    QUETIAPINE (SEROQUEL) 25 MG TABLET    TAKE 1 TABLET BY MOUTH EVERY NIGHT AT BEDTIME    ROSUVASTATIN (CRESTOR) 20 MG TABLET    Take 1 tablet by mouth daily    SITAGLIPTIN (JANUVIA) 100 MG TABLET    Take 1 tablet by mouth daily    TAMSULOSIN (FLOMAX) 0.4 MG CAPSULE    TAKE 1 CAPSULE BY MOUTH DAILY    TIOTROPIUM-OLODATEROL (STIOLTO) 2.5-2.5 MCG/ACT AERS    Inhale 2 puffs into the lungs daily    VITAMIN D (ERGOCALCIFEROL) 1.25 MG (27384 UT) CAPS CAPSULE    Take 1 capsule by mouth once a week       ALLERGIES     Patient has no known allergies. FAMILYHISTORY       Family History   Problem Relation Age of Onset    Stroke Mother     Heart Disease Daughter         MI    Stroke Daughter         SOCIAL HISTORY       Social History     Tobacco Use    Smoking status: Every Day     Packs/day: 2.00     Years: 50.00     Additional pack years: 0.00     Total pack years: 100.00     Types: Cigarettes    Smokeless tobacco: Never   Vaping Use    Vaping Use: Never used   Substance Use Topics    Alcohol use: No    Drug use:  No

## 2023-11-29 NOTE — ED NOTES
I/O #14 / cath completed, urine collected and sent to lab. Patient had urinated in the bed prior to cath completed. Patient cleaned and made comfortable.      Urmila Lopez RN  11/29/23 3938

## 2023-11-29 NOTE — ED NOTES
Attempted to call patient report to medical unit. Nurse will call ER back as soon as possible.      John Rodriguez RN  11/29/23 7232

## 2023-11-30 ENCOUNTER — APPOINTMENT (OUTPATIENT)
Dept: ULTRASOUND IMAGING | Facility: HOSPITAL | Age: 83
End: 2023-11-30
Payer: MEDICARE

## 2023-11-30 ENCOUNTER — APPOINTMENT (OUTPATIENT)
Dept: GENERAL RADIOLOGY | Facility: HOSPITAL | Age: 83
End: 2023-11-30
Payer: MEDICARE

## 2023-11-30 PROBLEM — I65.29 CAROTID STENOSIS: Status: ACTIVE | Noted: 2023-11-30

## 2023-11-30 PROBLEM — M25.569 KNEE PAIN: Status: ACTIVE | Noted: 2023-11-30

## 2023-11-30 PROBLEM — R31.9 HEMATURIA: Status: ACTIVE | Noted: 2023-11-30

## 2023-11-30 PROBLEM — I77.9 CAROTID DISEASE, BILATERAL (HCC): Status: ACTIVE | Noted: 2023-11-30

## 2023-11-30 LAB
25(OH)D3 SERPL-MCNC: 38.6 NG/ML (ref 32–100)
ALBUMIN SERPL-MCNC: 3.4 G/DL (ref 3.4–4.8)
ALBUMIN/GLOB SERPL: 1.9 {RATIO} (ref 0.8–2)
ALP SERPL-CCNC: 69 U/L (ref 25–100)
ALT SERPL-CCNC: 8 U/L (ref 4–36)
ANION GAP SERPL CALCULATED.3IONS-SCNC: 8 MMOL/L (ref 3–16)
AST SERPL-CCNC: 13 U/L (ref 8–33)
BILIRUB SERPL-MCNC: 0.5 MG/DL (ref 0.3–1.2)
BUN SERPL-MCNC: 19 MG/DL (ref 6–20)
CALCIUM SERPL-MCNC: 10.1 MG/DL (ref 8.5–10.5)
CHLORIDE SERPL-SCNC: 105 MMOL/L (ref 98–107)
CK SERPL-CCNC: 31 U/L (ref 26–174)
CO2 SERPL-SCNC: 28 MMOL/L (ref 20–30)
CREAT SERPL-MCNC: 1.3 MG/DL (ref 0.4–1.2)
ERYTHROCYTE [DISTWIDTH] IN BLOOD BY AUTOMATED COUNT: 14.2 % (ref 11–16)
FOLATE SERPL-MCNC: >20 NG/ML
GFR SERPLBLD CREATININE-BSD FMLA CKD-EPI: 55 ML/MIN/{1.73_M2}
GLOBULIN SER CALC-MCNC: 1.8 G/DL
GLUCOSE BLD-MCNC: 116 MG/DL (ref 74–106)
GLUCOSE BLD-MCNC: 121 MG/DL (ref 74–106)
GLUCOSE BLD-MCNC: 135 MG/DL (ref 74–106)
GLUCOSE BLD-MCNC: 97 MG/DL (ref 74–106)
GLUCOSE SERPL-MCNC: 97 MG/DL (ref 74–106)
HBA1C MFR BLD: 5.2 %
HCT VFR BLD AUTO: 37.1 % (ref 40–54)
HGB BLD-MCNC: 12.5 G/DL (ref 13–18)
MAGNESIUM SERPL-MCNC: 1.8 MG/DL (ref 1.7–2.4)
MCH RBC QN AUTO: 30.3 PG (ref 27–32)
MCHC RBC AUTO-ENTMCNC: 33.7 G/DL (ref 31–35)
MCV RBC AUTO: 89.8 FL (ref 80–100)
PERFORMED ON: ABNORMAL
PERFORMED ON: NORMAL
PLATELET # BLD AUTO: 192 K/UL (ref 150–400)
PMV BLD AUTO: 9.5 FL (ref 6–10)
POTASSIUM SERPL-SCNC: 2.7 MMOL/L (ref 3.4–5.1)
PROT SERPL-MCNC: 5.2 G/DL (ref 6.4–8.3)
RBC # BLD AUTO: 4.13 M/UL (ref 4.5–6)
SODIUM SERPL-SCNC: 141 MMOL/L (ref 136–145)
TSH SERPL-MCNC: 0.96 UIU/ML (ref 0.27–4.2)
VIT B12 SERPL-MCNC: 619 PG/ML (ref 211–911)
WBC # BLD AUTO: 6.7 K/UL (ref 4–11)

## 2023-11-30 PROCEDURE — 93925 LOWER EXTREMITY STUDY: CPT

## 2023-11-30 PROCEDURE — 83036 HEMOGLOBIN GLYCOSYLATED A1C: CPT

## 2023-11-30 PROCEDURE — 73560 X-RAY EXAM OF KNEE 1 OR 2: CPT

## 2023-11-30 PROCEDURE — 97116 GAIT TRAINING THERAPY: CPT

## 2023-11-30 PROCEDURE — 6360000002 HC RX W HCPCS: Performed by: PHYSICIAN ASSISTANT

## 2023-11-30 PROCEDURE — 6370000000 HC RX 637 (ALT 250 FOR IP): Performed by: PHYSICIAN ASSISTANT

## 2023-11-30 PROCEDURE — 96366 THER/PROPH/DIAG IV INF ADDON: CPT

## 2023-11-30 PROCEDURE — 96367 TX/PROPH/DG ADDL SEQ IV INF: CPT

## 2023-11-30 PROCEDURE — 93880 EXTRACRANIAL BILAT STUDY: CPT

## 2023-11-30 PROCEDURE — 36415 COLL VENOUS BLD VENIPUNCTURE: CPT

## 2023-11-30 PROCEDURE — 82746 ASSAY OF FOLIC ACID SERUM: CPT

## 2023-11-30 PROCEDURE — 82306 VITAMIN D 25 HYDROXY: CPT

## 2023-11-30 PROCEDURE — 82550 ASSAY OF CK (CPK): CPT

## 2023-11-30 PROCEDURE — 97165 OT EVAL LOW COMPLEX 30 MIN: CPT

## 2023-11-30 PROCEDURE — 99222 1ST HOSP IP/OBS MODERATE 55: CPT | Performed by: INTERNAL MEDICINE

## 2023-11-30 PROCEDURE — 97530 THERAPEUTIC ACTIVITIES: CPT

## 2023-11-30 PROCEDURE — 80053 COMPREHEN METABOLIC PANEL: CPT

## 2023-11-30 PROCEDURE — 51798 US URINE CAPACITY MEASURE: CPT

## 2023-11-30 PROCEDURE — G0378 HOSPITAL OBSERVATION PER HR: HCPCS

## 2023-11-30 PROCEDURE — 2580000003 HC RX 258: Performed by: INTERNAL MEDICINE

## 2023-11-30 PROCEDURE — 84443 ASSAY THYROID STIM HORMONE: CPT

## 2023-11-30 PROCEDURE — 85027 COMPLETE CBC AUTOMATED: CPT

## 2023-11-30 PROCEDURE — 96365 THER/PROPH/DIAG IV INF INIT: CPT

## 2023-11-30 PROCEDURE — 97161 PT EVAL LOW COMPLEX 20 MIN: CPT

## 2023-11-30 PROCEDURE — 96361 HYDRATE IV INFUSION ADD-ON: CPT

## 2023-11-30 PROCEDURE — 83735 ASSAY OF MAGNESIUM: CPT

## 2023-11-30 PROCEDURE — 82607 VITAMIN B-12: CPT

## 2023-11-30 RX ORDER — SODIUM CHLORIDE AND POTASSIUM CHLORIDE 150; 900 MG/100ML; MG/100ML
INJECTION, SOLUTION INTRAVENOUS CONTINUOUS
Status: ACTIVE | OUTPATIENT
Start: 2023-11-30 | End: 2023-11-30

## 2023-11-30 RX ORDER — FINASTERIDE 5 MG/1
5 TABLET, FILM COATED ORAL DAILY
Status: DISCONTINUED | OUTPATIENT
Start: 2023-11-30 | End: 2023-12-02 | Stop reason: HOSPADM

## 2023-11-30 RX ORDER — POTASSIUM CHLORIDE 7.45 MG/ML
10 INJECTION INTRAVENOUS
Status: COMPLETED | OUTPATIENT
Start: 2023-11-30 | End: 2023-11-30

## 2023-11-30 RX ORDER — MAGNESIUM SULFATE 1 G/100ML
1000 INJECTION INTRAVENOUS ONCE
Status: COMPLETED | OUTPATIENT
Start: 2023-11-30 | End: 2023-11-30

## 2023-11-30 RX ORDER — SODIUM CHLORIDE 9 MG/ML
INJECTION, SOLUTION INTRAVENOUS ONCE
Status: COMPLETED | OUTPATIENT
Start: 2023-11-30 | End: 2023-11-30

## 2023-11-30 RX ADMIN — POTASSIUM CHLORIDE 10 MEQ: 7.46 INJECTION, SOLUTION INTRAVENOUS at 13:48

## 2023-11-30 RX ADMIN — ESCITALOPRAM OXALATE 10 MG: 10 TABLET ORAL at 08:56

## 2023-11-30 RX ADMIN — POTASSIUM CHLORIDE 10 MEQ: 7.46 INJECTION, SOLUTION INTRAVENOUS at 12:46

## 2023-11-30 RX ADMIN — CILOSTAZOL 100 MG: 100 TABLET ORAL at 20:34

## 2023-11-30 RX ADMIN — SODIUM CHLORIDE: 9 INJECTION, SOLUTION INTRAVENOUS at 09:43

## 2023-11-30 RX ADMIN — POTASSIUM BICARBONATE 40 MEQ: 782 TABLET, EFFERVESCENT ORAL at 16:39

## 2023-11-30 RX ADMIN — DONEPEZIL HYDROCHLORIDE 10 MG: 5 TABLET, FILM COATED ORAL at 20:34

## 2023-11-30 RX ADMIN — METOPROLOL TARTRATE 12.5 MG: 25 TABLET, FILM COATED ORAL at 20:34

## 2023-11-30 RX ADMIN — POTASSIUM BICARBONATE 40 MEQ: 782 TABLET, EFFERVESCENT ORAL at 12:43

## 2023-11-30 RX ADMIN — ERGOCALCIFEROL 50000 UNITS: 1.25 CAPSULE ORAL at 08:56

## 2023-11-30 RX ADMIN — PANTOPRAZOLE SODIUM 40 MG: 40 TABLET, DELAYED RELEASE ORAL at 05:56

## 2023-11-30 RX ADMIN — MAGNESIUM SULFATE HEPTAHYDRATE 1000 MG: 1 INJECTION, SOLUTION INTRAVENOUS at 15:27

## 2023-11-30 RX ADMIN — TAMSULOSIN HYDROCHLORIDE 0.4 MG: 0.4 CAPSULE ORAL at 08:56

## 2023-11-30 RX ADMIN — CILOSTAZOL 100 MG: 100 TABLET ORAL at 08:56

## 2023-11-30 RX ADMIN — METOPROLOL TARTRATE 12.5 MG: 25 TABLET, FILM COATED ORAL at 08:57

## 2023-11-30 RX ADMIN — LOSARTAN POTASSIUM 50 MG: 50 TABLET, FILM COATED ORAL at 08:56

## 2023-11-30 RX ADMIN — MULTIPLE VITAMINS W/ MINERALS TAB 1 TABLET: TAB at 08:56

## 2023-11-30 RX ADMIN — POTASSIUM CHLORIDE AND SODIUM CHLORIDE: 900; 150 INJECTION, SOLUTION INTRAVENOUS at 12:42

## 2023-11-30 RX ADMIN — QUETIAPINE FUMARATE 25 MG: 25 TABLET ORAL at 20:34

## 2023-11-30 RX ADMIN — ALOGLIPTIN 12.5 MG: 12.5 TABLET, FILM COATED ORAL at 08:56

## 2023-11-30 RX ADMIN — ASPIRIN 81 MG 81 MG: 81 TABLET ORAL at 08:56

## 2023-11-30 RX ADMIN — FINASTERIDE 5 MG: 5 TABLET, FILM COATED ORAL at 09:40

## 2023-11-30 RX ADMIN — ROSUVASTATIN 20 MG: 20 TABLET, FILM COATED ORAL at 20:35

## 2023-11-30 RX ADMIN — FLUTICASONE PROPIONATE 1 SPRAY: 50 SPRAY, METERED NASAL at 09:06

## 2023-11-30 NOTE — FLOWSHEET NOTE
Pt had an incontinent episode, unable to complete post void bladder scan reminded pt to call out when he need to void. Pt verbalized understanding.

## 2023-11-30 NOTE — PLAN OF CARE
Problem: Safety - Adult  Goal: Free from fall injury  11/30/2023 0024 by Castro Person RN  Outcome: Progressing  11/29/2023 1944 by Marcelina Betancourt RN  Outcome: Progressing  11/29/2023 1943 by Marcelina Betancourt RN  Outcome: Progressing  11/29/2023 1943 by Marcelina Betancourt RN  Outcome: Progressing     Problem: Discharge Planning  Goal: Discharge to home or other facility with appropriate resources  11/30/2023 0024 by Castro Person RN  Outcome: Progressing  11/29/2023 1944 by Marcelina Betancourt RN  Outcome: Progressing  11/29/2023 1943 by Marcelina Betancourt RN  Outcome: Progressing  11/29/2023 1943 by Marcelina Betancourt RN  Outcome: Progressing  Flowsheets (Taken 11/29/2023 1525)  Discharge to home or other facility with appropriate resources:   Identify discharge learning needs (meds, wound care, etc)   Identify barriers to discharge with patient and caregiver     Problem: Pain  Goal: Verbalizes/displays adequate comfort level or baseline comfort level  11/29/2023 1944 by Marcelina Betancourt RN  Outcome: Progressing  11/29/2023 1943 by Marcelina Betancourt RN  Outcome: Progressing     Problem: Skin/Tissue Integrity  Goal: Absence of new skin breakdown  Description: 1. Monitor for areas of redness and/or skin breakdown  2. Assess vascular access sites hourly  3. Every 4-6 hours minimum:  Change oxygen saturation probe site  4. Every 4-6 hours:  If on nasal continuous positive airway pressure, respiratory therapy assess nares and determine need for appliance change or resting period.   11/29/2023 1944 by Marcelina Betancourt RN  Outcome: Progressing

## 2023-11-30 NOTE — CARE COORDINATION
Case Management Assessment  Initial Evaluation    Date/Time of Evaluation: 11/30/2023 11:59 AM  Assessment Completed by: Del Almanza RN    If patient is discharged prior to next notation, then this note serves as note for discharge by case management. Patient Name: Sara Jose                   YOB: 1940  Diagnosis: General weakness [R53.1]  Fall (on)(from) incline, initial encounter [W10. 2XXA]  Fall, initial encounter L415600. XXXA]                   Date / Time: 11/29/2023  9:22 AM    Patient Admission Status: Observation   Readmission Risk (Low < 19, Mod (19-27), High > 27): No data recorded  Current PCP: COURT Jarrell  PCP verified by ? Yes    Chart Reviewed: Yes      History Provided by: Medical Record, Patient  Patient Orientation: Alert and Oriented    Patient Cognition: Alert    Hospitalization in the last 30 days (Readmission):  No    If yes, Readmission Assessment in  Navigator will be completed.     Advance Directives:      Code Status: Full Code   Patient's Primary Decision Maker is: Legal Next of Kin    Primary Decision MakerCclifton Delgado Spouse - [phone]    Secondary Decision Maker: Dione Finger - Child - [phone]    Secondary Decision Maker: Juan Diego Robins - Child - [phone]    Discharge Planning:    Patient lives with: Spouse/Significant Other (daughter assists as needed) Type of Home: House  Primary Care Giver: Spouse  Patient Support Systems include: Spouse/Significant Other, Children   Current Financial resources: Medicare  Current community resources:  none  Current services prior to admission: Durable Medical Equipment            Current DME: Di Area, Other (Comment), Walker (cane, quad cane, RW, lift chair)            Type of Home Care services:  None    ADLS  Prior functional level: Assistance with the following:, Bathing, Dressing, Toileting, Cooking, Housework, Shopping, Mobility  Current functional level: Assistance with the following:,

## 2023-11-30 NOTE — PLAN OF CARE
Problem: Discharge Planning  Goal: Discharge to home or other facility with appropriate resources  11/29/2023 1944 by Lopez Stark RN  Outcome: Progressing  11/29/2023 1943 by Lopez Stark RN  Outcome: Progressing  11/29/2023 1943 by Lopez Stark RN  Outcome: Progressing  Flowsheets (Taken 11/29/2023 1525)  Discharge to home or other facility with appropriate resources:   Identify discharge learning needs (meds, wound care, etc)   Identify barriers to discharge with patient and caregiver     Problem: Safety - Adult  Goal: Free from fall injury  11/29/2023 1944 by Lopez Stark RN  Outcome: Progressing  11/29/2023 1943 by Lopez Stark RN  Outcome: Progressing  11/29/2023 1943 by Lopez Stark RN  Outcome: Progressing     Problem: Pain  Goal: Verbalizes/displays adequate comfort level or baseline comfort level  11/29/2023 1944 by Lopez Stark RN  Outcome: Progressing  11/29/2023 1943 by Lopez Stark RN  Outcome: Progressing     Problem: Skin/Tissue Integrity  Goal: Absence of new skin breakdown  Description: 1. Monitor for areas of redness and/or skin breakdown  2. Assess vascular access sites hourly  3. Every 4-6 hours minimum:  Change oxygen saturation probe site  4. Every 4-6 hours:  If on nasal continuous positive airway pressure, respiratory therapy assess nares and determine need for appliance change or resting period.   Outcome: Progressing

## 2023-11-30 NOTE — FLOWSHEET NOTE
Pt admitted to medical unit room 10 for general weakness and fall. Pt is alert and oriented but hard of hearing. Pt noted to have scattered scabs and bruises also abrasions on bilateral knees from fall this am. Pt noted to have end expiratory wheezes, bowel sounds positive and pt reports bm this am. Unable to palpate pts pulse in left foot, able to hear pulse with a doppler and pulse marked with a pen. Foot also noted to be cool but has brisk cap refill. Pulse is palpable in left foot. Notified [facility] of these findings and new orders for a ultrasound ordered for tomorrow. Pt and family informed of plan of care. Pt instructed to call out when he needs to urinate so post void residual bladder scan can be obtained.

## 2023-11-30 NOTE — H&P
no acute distress  Eyes:  PERRL, no scleral icterus, conjunctiva normal   HENT:  Atraumatic, external ears normal, nose normal, oropharynx moist, no pharyngeal exudates. Neck- supple, no JVD, no lymphadenopathy  Respiratory:  No respiratory distress on RA, no wheezing, rales or rhonchi detected  Cardiovascular:  Normal rate, normal rhythm, no gallops, no rubs, no edema   GI:  Soft, nondistended, normal bowel sounds, nontender, no voluntary guarding  Musculoskeletal:  No cyanosis or obvious acute deformity. Moving all extremities with generalized weakness throughout. Integument:  Warm and dry. Neurologic:  Alert & oriented x 3, no apparent focal deficits noted, intermittent confusion noted  Psychiatric:  Speech and behavior appropriate, blunted affect        Lab Results   Component Value Date     11/30/2023    K 2.7 (LL) 11/30/2023     11/30/2023    CO2 28 11/30/2023    BUN 19 11/30/2023    CREATININE 1.3 (H) 11/30/2023    GLUCOSE 97 11/30/2023    CALCIUM 10.1 11/30/2023    PROT 5.2 (L) 11/30/2023    LABALBU 3.4 11/30/2023    BILITOT 0.5 11/30/2023    ALKPHOS 69 11/30/2023    AST 13 11/30/2023    ALT 8 11/30/2023    LABGLOM 55 (L) 11/30/2023    GFRAA 50 (L) 10/17/2022    AGRATIO 1.9 11/30/2023    GLOB 1.8 11/30/2023           Lab Results   Component Value Date    WBC 6.7 11/30/2023    HGB 12.5 (L) 11/30/2023    HCT 37.1 (L) 11/30/2023    MCV 89.8 11/30/2023     11/30/2023       US DUP LOWER ART/BYPASS GRAFTS BILATERAL COMPLETE   Final Result      Extensive atherosclerotic disease, right greater than left, with poor flow   throughout the right lower extremity. XR CHEST PORTABLE   Final Result      No acute pulmonary pathology                  CT ABDOMEN PELVIS WO CONTRAST Additional Contrast? None   Final Result      No cause for acute pain identified. Increased size of small-moderate pericardial effusion.          US CAROTID ARTERY BILATERAL    (Results Pending)   XR KNEE LEFT (1-2 VIEWS)    (Results Pending)   XR KNEE RIGHT (1-2 VIEWS)    (Results Pending)          Refer to Dr. Gretchen Roberson' Assessment and plan with recommendations and further details below. Patient was seen and examined with him. Dr. Gretchen Roberson formulated the assessment and plan. Electronically signed by MORRIS Scott on 11/30/2023 at 12:07 PM    Assessment and Plan     Patient was seen and examined with MORRIS Scott. Agree with note as above. Assessment and plan was done by me as below. Active Hospital Problems    Diagnosis Date Noted    Dementia with behavioral disturbance ([address]) [L83.608]  Continue Aricept. Treat any acute issues and try to improve other chronic medical problems as able. Seems to be stable. 08/01/2022         Hypercalcemia [E83.52]  Patient with a prior history of hypercalcemia. Proceed with gentle hydration. Prior CT scan of the chest, abdomen pelvis was unremarkable. Last PSA was within normal limits. PTH was within normal limit in the past despite being hypercalcemic. Continue to monitor labs closely. 07/29/2022         Falls [W19. XXXA]  Treat any acute issues and try to improve other chronic medical problems as able. Severe PVD in the lower extremities. Proceed with carotid duplex specially with his severe carotid disease. PT/OT. 06/02/2022         Carotid stenosis [I65.29]  Severe disease per CTA last December. Carotid duplex was ordered. Results still pending. Make sure blood pressure and lipid profile under good control. Continue aspirin, Pletal and Crestor. 11/30/2023    Hematuria [R31.9]  CT scan of the abdomen pelvis was unremarkable. Simple renal cyst bilaterally. 3.  Benefit from urology consult for further evaluation. 11/30/2023    Knee pain [M25.569]  Proceed with x-ray. Further recommendation based on test result. 11/30/2023    History of stroke [Z86.73]  Continue current regimen  Blood pressure under good control. PT/OT. Lisinopril added to Plavix.

## 2023-11-30 NOTE — FLOWSHEET NOTE
4 Eyes Skin Assessment     NAME:  Leslie Martinez OF BIRTH:  1940  MEDICAL RECORD NUMBER:  [id number]    The patient is being assessed for  Admission    I agree that at least one RN has performed a thorough Head to Toe Skin Assessment on the patient. ALL assessment sites listed below have been assessed. Areas assessed by both nurses:    Head, Face, Ears, Shoulders, Back, Chest, Arms, Elbows, Hands, Sacrum. Buttock, Coccyx, Ischium, and Legs. Feet and Heels        Does the Patient have a Wound? No noted wound(s) pt is noted to have scattered bruises and scabs, small abrasion noted to bilateral knees.        Jay Prevention initiated by RN: Yes  Wound Care Orders initiated by RN: No    Pressure Injury (Stage 3,4, Unstageable, DTI, NWPT, and Complex wounds) if present, place Wound referral order by RN under : No    New Ostomies, if present place, Ostomy referral order under : No     Nurse 1 eSignature: Electronically signed by Aguilar Medina RN on 11/29/23 at 7:12 PM EST    **SHARE this note so that the co-signing nurse can place an eSignature**    Nurse 2 eSignature: {Esignature:[id number]}

## 2023-11-30 NOTE — FLOWSHEET NOTE
11/30/23 0856   Assessment   Charting Type Shift assessment   Psychosocial   Psychosocial (WDL) WDL   Neurological   Neuro (WDL) WDL   Level of Consciousness 0   [place] Coma Scale   Eye Opening 4   Best Verbal Response 5   Best Motor Response 6   [place] Coma Scale Score 15   HEENT (Head, Ears, Eyes, Nose, & Throat)   HEENT (WDL) X   Right Ear Other (comment)  (hard of hearing)   Left Ear Other (comment)  (hard of hearing)   Respiratory   Respiratory (WDL) X   Respiratory Interventions Cough & deep breathe   Breath Sounds   Right Upper Lobe Clear;Diminished   Right Middle Lobe Clear;Diminished   Right Lower Lobe Clear;Diminished   Left Upper Lobe Clear;Diminished   Left Lower Lobe Clear;Diminished   Cardiac   Cardiac (WDL) X   Cardiac Regularity Regularly Irregular   Heart Sounds S1, S2   Rhythm Interpretation   Pulse 54   Gastrointestinal   Abdominal (WDL) WDL   Last BM (including prior to admit) 11/29/23   RUQ Bowel Sounds Active   LUQ Bowel Sounds Active   RLQ Bowel Sounds Active   LLQ Bowel Sounds Active   Genitourinary   Genitourinary (WDL) X   Urine Assessment   Urinary Status Voiding   Peripheral Vascular   Peripheral Vascular (WDL) X   Edema None   RLE Neurovascular Assessment   Capillary Refill Less than/Equal to 3 seconds   Color Pale;Pink   Temperature Cool   R Pedal Pulse Doppler   LLE Neurovascular Assessment   Capillary Refill Less than/Equal to 3 seconds   Color Pale   Temperature Cool   L Pedal Pulse +2   Skin Integumentary    Skin Integumentary (WDL) X   Skin Color Pale   Skin Condition/Temp Dry; Warm   Skin Integrity Ecchymosis   Location scattered   Care Plan - Skin/Tissue Integrity Goals   Skin Integrity Remains Intact Monitor for areas of redness and/or skin breakdown   Musculoskeletal   Musculoskeletal (WDL) X   RUE Weakness   LUE Weakness   RL Extremity Weakness; Unsteady   LL Extremity Weakness; Unsteady   Care Plan - Discharge Planning Goals   Discharge to home or other facility with

## 2023-11-30 NOTE — PLAN OF CARE
Problem: Discharge Planning  Goal: Discharge to home or other facility with appropriate resources  11/30/2023 0955 by Hemant Cummins RN  Outcome: Progressing  Flowsheets (Taken 11/30/2023 3303)  Discharge to home or other facility with appropriate resources:   Identify barriers to discharge with patient and caregiver   Identify discharge learning needs (meds, wound care, etc)  11/30/2023 0024 by Ashley Gaxiola RN  Outcome: Progressing     Problem: Safety - Adult  Goal: Free from fall injury  11/30/2023 0955 by Hemant Cummins RN  Outcome: Progressing  11/30/2023 0024 by Ashley Gaxiola RN  Outcome: Progressing     Problem: Pain  Goal: Verbalizes/displays adequate comfort level or baseline comfort level  Outcome: Progressing     Problem: Skin/Tissue Integrity  Goal: Absence of new skin breakdown  Description: 1. Monitor for areas of redness and/or skin breakdown  2. Assess vascular access sites hourly  3. Every 4-6 hours minimum:  Change oxygen saturation probe site  4. Every 4-6 hours:  If on nasal continuous positive airway pressure, respiratory therapy assess nares and determine need for appliance change or resting period.   Outcome: Progressing

## 2023-11-30 NOTE — PROGRESS NOTES
Physical Therapy  Facility/Department: [facility]  Physical Therapy Initial Assessment    Name: Susy Childress  :   MRN: [id number]  Date of Service: 2023    Discharge Recommendations:  Continue to assess pending progress, 24 hour supervision or assist, Home with assist PRN, Home with Home health PT          Patient Diagnosis(es): The primary encounter diagnosis was Fall, initial encounter. A diagnosis of General weakness was also pertinent to this visit. Past Medical History:  has a past medical history of Cataract, DM (diabetes mellitus) ([address]), Erectile dysfunction, Hard of hearing, High cholesterol, Hypertension, Hypothyroidism, Peripheral vascular disease ([address]), Pneumonia, and Stroke ([address]). Past Surgical History:  has a past surgical history that includes cyst removal; Foot surgery; and eye surgery (2012). Assessment   Body Structures, Functions, Activity Limitations Requiring Skilled Therapeutic Intervention: Decreased functional mobility ; Decreased safe awareness;Decreased strength;Decreased balance;Decreased endurance  Assessment: PT eval completed on this patient admitted with primary diagnosis of fall. He is received lying in bed on RA. NAD. Pleasant and cooperative. He is able to perform bed mobility with mod I. Sit to stand, transfers and able to ambulate 80' with RW and CGA. Slow sheila and decreased step length and height. Requires cues for safety. Patient returned to bed with daughter present at bedside. He presents with deficits in functional mobility but is expected to benefit from continued skilled PT intervention to improve his overall functional mobility status prior to D/c. Therapy Prognosis: Good  Decision Making: Low Complexity  Requires PT Follow-Up: Yes  Activity Tolerance  Activity Tolerance: Patient tolerated evaluation without incident;Patient limited by endurance; Patient limited by fatigue     Plan   Physical Therapy Plan  General Plan: 3-5 times per

## 2023-12-01 LAB
ALBUMIN SERPL-MCNC: 3.7 G/DL (ref 3.4–4.8)
ALBUMIN/GLOB SERPL: 1.9 {RATIO} (ref 0.8–2)
ALP SERPL-CCNC: 70 U/L (ref 25–100)
ALT SERPL-CCNC: 11 U/L (ref 4–36)
ANION GAP SERPL CALCULATED.3IONS-SCNC: 8 MMOL/L (ref 3–16)
AST SERPL-CCNC: 15 U/L (ref 8–33)
BASOPHILS # BLD: 0.1 K/UL (ref 0–0.1)
BASOPHILS NFR BLD: 0.6 %
BILIRUB SERPL-MCNC: 0.6 MG/DL (ref 0.3–1.2)
BILIRUB UR QL STRIP.AUTO: NEGATIVE
BUN SERPL-MCNC: 12 MG/DL (ref 6–20)
CALCIUM SERPL-MCNC: 10.2 MG/DL (ref 8.5–10.5)
CHLORIDE SERPL-SCNC: 110 MMOL/L (ref 98–107)
CLARITY UR: ABNORMAL
CO2 SERPL-SCNC: 26 MMOL/L (ref 20–30)
COLOR UR: ABNORMAL
CREAT SERPL-MCNC: 1.2 MG/DL (ref 0.4–1.2)
EOSINOPHIL # BLD: 0.2 K/UL (ref 0–0.4)
EOSINOPHIL NFR BLD: 2.2 %
EPI CELLS #/AREA URNS HPF: ABNORMAL /HPF (ref 0–5)
ERYTHROCYTE [DISTWIDTH] IN BLOOD BY AUTOMATED COUNT: 14.4 % (ref 11–16)
GFR SERPLBLD CREATININE-BSD FMLA CKD-EPI: >60 ML/MIN/{1.73_M2}
GLOBULIN SER CALC-MCNC: 1.9 G/DL
GLUCOSE BLD-MCNC: 102 MG/DL (ref 74–106)
GLUCOSE BLD-MCNC: 124 MG/DL (ref 74–106)
GLUCOSE BLD-MCNC: 130 MG/DL (ref 74–106)
GLUCOSE BLD-MCNC: 170 MG/DL (ref 74–106)
GLUCOSE SERPL-MCNC: 133 MG/DL (ref 74–106)
GLUCOSE UR STRIP.AUTO-MCNC: NEGATIVE MG/DL
HCT VFR BLD AUTO: 39.7 % (ref 40–54)
HGB BLD-MCNC: 13.5 G/DL (ref 13–18)
HGB UR QL STRIP.AUTO: ABNORMAL
IMM GRANULOCYTES # BLD: 0 K/UL
IMM GRANULOCYTES NFR BLD: 0.4 % (ref 0–5)
KETONES UR STRIP.AUTO-MCNC: NEGATIVE MG/DL
LEUKOCYTE ESTERASE UR QL STRIP.AUTO: NEGATIVE
LYMPHOCYTES # BLD: 1.9 K/UL (ref 1.5–4)
LYMPHOCYTES NFR BLD: 22.8 %
MAGNESIUM SERPL-MCNC: 1.9 MG/DL (ref 1.7–2.4)
MCH RBC QN AUTO: 30.3 PG (ref 27–32)
MCHC RBC AUTO-ENTMCNC: 34 G/DL (ref 31–35)
MCV RBC AUTO: 89.2 FL (ref 80–100)
MONOCYTES # BLD: 0.6 K/UL (ref 0.2–0.8)
MONOCYTES NFR BLD: 7.9 %
NEUTROPHILS # BLD: 5.4 K/UL (ref 2–7.5)
NEUTS SEG NFR BLD: 66.1 %
NITRITE UR QL STRIP.AUTO: NEGATIVE
OTHER ELEMENTS URNS MICRO: ABNORMAL
PERFORMED ON: ABNORMAL
PERFORMED ON: NORMAL
PH UR STRIP.AUTO: 7 [PH] (ref 5–8)
PLATELET # BLD AUTO: 202 K/UL (ref 150–400)
PMV BLD AUTO: 9.4 FL (ref 6–10)
POTASSIUM SERPL-SCNC: 3.7 MMOL/L (ref 3.4–5.1)
PROT SERPL-MCNC: 5.6 G/DL (ref 6.4–8.3)
PROT UR STRIP.AUTO-MCNC: 100 MG/DL
RBC # BLD AUTO: 4.45 M/UL (ref 4.5–6)
RBC #/AREA URNS HPF: >100 /HPF (ref 0–4)
SODIUM SERPL-SCNC: 144 MMOL/L (ref 136–145)
SP GR UR STRIP.AUTO: 1.02 (ref 1–1.03)
UA COMPLETE W REFLEX CULTURE PNL UR: ABNORMAL
UA DIPSTICK W REFLEX MICRO PNL UR: YES
URN SPEC COLLECT METH UR: ABNORMAL
UROBILINOGEN UR STRIP-ACNC: 0.2 E.U./DL
WBC # BLD AUTO: 8.1 K/UL (ref 4–11)
WBC #/AREA URNS HPF: ABNORMAL /HPF (ref 0–5)

## 2023-12-01 PROCEDURE — 6370000000 HC RX 637 (ALT 250 FOR IP): Performed by: PHYSICIAN ASSISTANT

## 2023-12-01 PROCEDURE — 85025 COMPLETE CBC W/AUTO DIFF WBC: CPT

## 2023-12-01 PROCEDURE — 80053 COMPREHEN METABOLIC PANEL: CPT

## 2023-12-01 PROCEDURE — G0378 HOSPITAL OBSERVATION PER HR: HCPCS

## 2023-12-01 PROCEDURE — 6360000002 HC RX W HCPCS: Performed by: PHYSICIAN ASSISTANT

## 2023-12-01 PROCEDURE — 83735 ASSAY OF MAGNESIUM: CPT

## 2023-12-01 PROCEDURE — 99232 SBSQ HOSP IP/OBS MODERATE 35: CPT | Performed by: INTERNAL MEDICINE

## 2023-12-01 PROCEDURE — 36415 COLL VENOUS BLD VENIPUNCTURE: CPT

## 2023-12-01 PROCEDURE — 96366 THER/PROPH/DIAG IV INF ADDON: CPT

## 2023-12-01 PROCEDURE — 97530 THERAPEUTIC ACTIVITIES: CPT

## 2023-12-01 PROCEDURE — 96367 TX/PROPH/DG ADDL SEQ IV INF: CPT

## 2023-12-01 PROCEDURE — 81001 URINALYSIS AUTO W/SCOPE: CPT

## 2023-12-01 PROCEDURE — 97535 SELF CARE MNGMENT TRAINING: CPT

## 2023-12-01 PROCEDURE — 2580000003 HC RX 258: Performed by: PHYSICIAN ASSISTANT

## 2023-12-01 PROCEDURE — 2500000003 HC RX 250 WO HCPCS: Performed by: PHYSICIAN ASSISTANT

## 2023-12-01 RX ADMIN — PANTOPRAZOLE SODIUM 40 MG: 40 TABLET, DELAYED RELEASE ORAL at 05:59

## 2023-12-01 RX ADMIN — CILOSTAZOL 100 MG: 100 TABLET ORAL at 08:05

## 2023-12-01 RX ADMIN — ESCITALOPRAM OXALATE 10 MG: 10 TABLET ORAL at 08:05

## 2023-12-01 RX ADMIN — QUETIAPINE FUMARATE 25 MG: 25 TABLET ORAL at 20:50

## 2023-12-01 RX ADMIN — THIAMINE HYDROCHLORIDE: 100 INJECTION, SOLUTION INTRAMUSCULAR; INTRAVENOUS at 17:16

## 2023-12-01 RX ADMIN — FLUTICASONE PROPIONATE 1 SPRAY: 50 SPRAY, METERED NASAL at 08:07

## 2023-12-01 RX ADMIN — CILOSTAZOL 100 MG: 100 TABLET ORAL at 20:50

## 2023-12-01 RX ADMIN — METOPROLOL TARTRATE 12.5 MG: 25 TABLET, FILM COATED ORAL at 20:50

## 2023-12-01 RX ADMIN — ROSUVASTATIN 20 MG: 20 TABLET, FILM COATED ORAL at 20:50

## 2023-12-01 RX ADMIN — FINASTERIDE 5 MG: 5 TABLET, FILM COATED ORAL at 08:05

## 2023-12-01 RX ADMIN — METOPROLOL TARTRATE 12.5 MG: 25 TABLET, FILM COATED ORAL at 08:05

## 2023-12-01 RX ADMIN — DONEPEZIL HYDROCHLORIDE 10 MG: 5 TABLET, FILM COATED ORAL at 20:50

## 2023-12-01 RX ADMIN — MULTIPLE VITAMINS W/ MINERALS TAB 1 TABLET: TAB at 08:05

## 2023-12-01 RX ADMIN — TAMSULOSIN HYDROCHLORIDE 0.4 MG: 0.4 CAPSULE ORAL at 08:05

## 2023-12-01 RX ADMIN — ALOGLIPTIN 12.5 MG: 12.5 TABLET, FILM COATED ORAL at 08:05

## 2023-12-01 RX ADMIN — LOSARTAN POTASSIUM 50 MG: 50 TABLET, FILM COATED ORAL at 08:05

## 2023-12-01 RX ADMIN — ASPIRIN 81 MG 81 MG: 81 TABLET ORAL at 08:05

## 2023-12-01 NOTE — PLAN OF CARE
Problem: Discharge Planning  Goal: Discharge to home or other facility with appropriate resources  11/30/2023 2126 by Kam Goodwin RN  Outcome: Progressing  11/30/2023 0955 by Carmella Mitchell RN  Outcome: Progressing  Flowsheets (Taken 11/30/2023 4999)  Discharge to home or other facility with appropriate resources:   Identify barriers to discharge with patient and caregiver   Identify discharge learning needs (meds, wound care, etc)     Problem: Safety - Adult  Goal: Free from fall injury  11/30/2023 2126 by Kam Goodwin RN  Outcome: Progressing  11/30/2023 0955 by Carmella Mitchell RN  Outcome: Progressing     Problem: Pain  Goal: Verbalizes/displays adequate comfort level or baseline comfort level  11/30/2023 2126 by Kam Goodwin RN  Outcome: Progressing  11/30/2023 0955 by Carmella Mitchell RN  Outcome: Progressing     Problem: Skin/Tissue Integrity  Goal: Absence of new skin breakdown  Description: 1. Monitor for areas of redness and/or skin breakdown  2. Assess vascular access sites hourly  3. Every 4-6 hours minimum:  Change oxygen saturation probe site  4. Every 4-6 hours:  If on nasal continuous positive airway pressure, respiratory therapy assess nares and determine need for appliance change or resting period.   11/30/2023 2126 by Kam Goodwin RN  Outcome: Progressing  11/30/2023 0955 by Carmella Mitchell RN  Outcome: Progressing     Problem: Chronic Conditions and Co-morbidities  Goal: Patient's chronic conditions and co-morbidity symptoms are monitored and maintained or improved  Outcome: Progressing     Problem: ABCDS Injury Assessment  Goal: Absence of physical injury  Outcome: Progressing

## 2023-12-01 NOTE — PROGRESS NOTES
Contact-guard assistance  Scooting: Contact-guard assistance  Balance  Sitting: Intact  Standing: High guard  Transfer Training  Transfer Training: Yes  Overall Level of Assistance: Contact-guard assistance  Interventions: Verbal cues  Sit to Stand: Contact-guard assistance  Stand to Sit: Contact-guard assistance  Stand Pivot Transfers: Contact-guard assistance  Toilet Transfer: Contact-guard assistance  Gait Training  Gait Training: Yes  Gait  Overall Level of Assistance: Contact-guard assistance  Distance (ft): 10 Feet (x2 to/from bathroom)  Assistive Device: Walker, rolling;Gait belt  Interventions: Verbal cues     Safety Devices  Type of Devices: Left in bed;Bed alarm in place;Call light within reach     Goals  Short Term Goals  Time Frame for Short Term Goals: 10 days  Short Term Goal 1: Patient will perform all bed mobility with independence. Short Term Goal 2: Patient will perform sit to stand and transfers with RW with SBA. Short Term Goal 3: Patient will ambulate 200'x2 with RW and SBA. Short Term Goal 4: Patient will perform B LE ther ex x 15 reps. Therapy Time   Individual Concurrent Group Co-treatment   Time In 5067         Time Out 1142         Minutes 20             This note serves as D/C summary if patient is discharged prior to next visit.    Chip Ask, PTA

## 2023-12-01 NOTE — PROGRESS NOTES
Advised by provider Elicia CACERES that patient family is seeking NH placement for patient at [facility]. Sending referral to [facility].

## 2023-12-01 NOTE — PROGRESS NOTES
Spoke with patient Daughter Doyle Milton, stated that they would be interested in hospice consult, referral sent to hospice of [facility].

## 2023-12-01 NOTE — FLOWSHEET NOTE
12/01/23 0905   Assessment   Charting Type Shift assessment   Psychosocial   Psychosocial (WDL) WDL   Neurological   Neuro (WDL) WDL   Level of Consciousness 0   [place] Coma Scale   Eye Opening 4   Best Verbal Response 4   Best Motor Response 6   [place] Coma Scale Score 14   HEENT (Head, Ears, Eyes, Nose, & Throat)   HEENT (WDL) X   Right Ear Other (comment)  (hard of hearing)   Left Ear Other (comment)  (hard of hearing)   Respiratory   Respiratory (WDL) X   Respiratory Interventions Cough & deep breathe   Breath Sounds   Right Upper Lobe Clear;Diminished   Right Middle Lobe Clear;Diminished   Right Lower Lobe Clear;Diminished   Left Upper Lobe Clear;Diminished   Left Lower Lobe Clear;Diminished   Cardiac   Cardiac (WDL) X   Cardiac Regularity Regularly Irregular   Heart Sounds S1, S2   Gastrointestinal   Abdominal (WDL) WDL   Last BM (including prior to admit) 12/01/23   RUQ Bowel Sounds Active   LUQ Bowel Sounds Active   RLQ Bowel Sounds Active   LLQ Bowel Sounds Active   Genitourinary   Genitourinary (WDL) X   Urine Assessment   Urinary Status Voiding   Peripheral Vascular   Peripheral Vascular (WDL) X   Edema None   RLE Neurovascular Assessment   Capillary Refill Less than/Equal to 3 seconds   Color Pale;Pink   Temperature Cool   LLE Neurovascular Assessment   Capillary Refill Less than/Equal to 3 seconds   Color Pale   Temperature Cool   Skin Integumentary    Skin Integumentary (WDL) X   Skin Color Pale   Skin Condition/Temp Dry; Warm   Skin Integrity Ecchymosis   Location scattered   Care Plan - Skin/Tissue Integrity Goals   Skin Integrity Remains Intact Monitor for areas of redness and/or skin breakdown   Musculoskeletal   Musculoskeletal (WDL) X   RUE Weakness   LUE Weakness   RL Extremity Weakness; Unsteady   LL Extremity Weakness; Unsteady   Care Plan - Discharge Planning Goals   Discharge to home or other facility with appropriate resources Identify barriers to discharge with patient and caregiver; Identify discharge learning needs (meds, wound care, etc)     Pt is alert and oriented. Pt is resting in bed and appears to have no acute distress. Pt currently on room air. Pt's lung sounds are clear and diminished. Pt encouraged to cough and deep breathe. Pt call bell and bedside table within reach.

## 2023-12-01 NOTE — FLOWSHEET NOTE
11/30/23 2039   Assessment   Charting Type Shift assessment   Psychosocial   Psychosocial (WDL) WDL   Neurological   Neuro (WDL) WDL   Level of Consciousness 0   [place] Coma Scale   Eye Opening 4   Best Verbal Response 5   Best Motor Response 6   [place] Coma Scale Score 15   HEENT (Head, Ears, Eyes, Nose, & Throat)   HEENT (WDL) X   Right Ear Other (comment)  (hard of hearing)   Left Ear Other (comment)  (hard of hearing)   Respiratory   Respiratory (WDL) X   Breath Sounds   Right Upper Lobe Clear;Diminished   Right Middle Lobe Clear;Diminished   Right Lower Lobe Clear;Diminished   Left Upper Lobe Clear;Diminished   Left Lower Lobe Clear;Diminished   Cardiac   Cardiac (WDL) X   Cardiac Regularity Regularly Irregular   Heart Sounds S1, S2   Gastrointestinal   Abdominal (WDL) WDL   Last BM (including prior to admit) 11/29/23   RUQ Bowel Sounds Active   LUQ Bowel Sounds Active   RLQ Bowel Sounds Active   LLQ Bowel Sounds Active   Genitourinary   Genitourinary (WDL) X   Urine Assessment   Urine Color Brown   Urine Appearance Cloudy   Urine Odor Malodorous   Peripheral Vascular   Peripheral Vascular (WDL) X   Edema None   RLE Neurovascular Assessment   Capillary Refill Less than/Equal to 3 seconds   Color Pale;Pink   Temperature Cool   R Pedal Pulse Doppler   LLE Neurovascular Assessment   Capillary Refill Less than/Equal to 3 seconds   Color Pale   Temperature Cool   L Pedal Pulse +2   Skin Integumentary    Skin Integumentary (WDL) X   Skin Color Pale   Skin Condition/Temp Warm;Dry   Skin Integrity Ecchymosis   Location scattered   Musculoskeletal   Musculoskeletal (WDL) X   RUE Weakness   LUE Weakness   RL Extremity Weakness; Unsteady   LL Extremity Weakness; Unsteady

## 2023-12-01 NOTE — PROGRESS NOTES
Occupational Therapy  Facility/Department: [facility]  Daily Treatment Note  NAME: Carolyn Cobos  :   MRN: [id number]    Date of Service: 2023    Discharge Recommendations:   LTC/24 hour supervision      Patient Diagnosis(es): The primary encounter diagnosis was Fall, initial encounter. A diagnosis of General weakness was also pertinent to this visit. Assessment    Assessment: Pt received in bed on this date agreeable to attempt therapy services. CO tx with PTA. Pt transferred to sitting at EOB with CGA. Pt reported fatigue. Pt come to stand from EOB with CGA. Pt ambulated to bathroom with RW and CGA. Pt required min assist for LB dressing and MOD A for toileting including thorough tanisha hygiene. Pt ambulated back to bed and reported significant fatigue after toileting. Pt completed grooming with SBA. Pt states he is unable to tolerate further activity at this time. Pt transferred to supine with CGA. Pt positioned for comfort. Call light in reach upon exit. Treatment session limited by pt endurance.   Activity Tolerance: Patient limited by fatigue;Patient limited by endurance     Subjective   Subjective  Subjective: I feel really bad       Objective    Vitals     Bed Mobility Training  Bed Mobility Training: Yes  Overall Level of Assistance: Contact-guard assistance  Interventions: Verbal cues  Rolling: Contact-guard assistance  Supine to Sit: Contact-guard assistance  Sit to Supine: Contact-guard assistance  Scooting: Contact-guard assistance  Balance  Sitting: Intact  Standing: High guard  Transfer Training  Transfer Training: Yes  Overall Level of Assistance: Contact-guard assistance  Interventions: Verbal cues  Sit to Stand: Contact-guard assistance  Stand to Sit: Contact-guard assistance  Stand Pivot Transfers: Contact-guard assistance  Toilet Transfer: Contact-guard assistance  Gait Training  Gait Training: Yes  Gait  Overall Level of Assistance: Contact-guard assistance  Distance (ft):

## 2023-12-01 NOTE — PLAN OF CARE
Problem: Discharge Planning  Goal: Discharge to home or other facility with appropriate resources  12/1/2023 1054 by Elaine Srivastava RN  Outcome: Progressing  Flowsheets (Taken 12/1/2023 9497)  Discharge to home or other facility with appropriate resources:   Identify barriers to discharge with patient and caregiver   Identify discharge learning needs (meds, wound care, etc)  11/30/2023 2126 by Heike Ziegler RN  Outcome: Progressing     Problem: Safety - Adult  Goal: Free from fall injury  12/1/2023 1054 by Elaine Srivastava RN  Outcome: Progressing  11/30/2023 2126 by Heike Ziegler RN  Outcome: Progressing     Problem: Pain  Goal: Verbalizes/displays adequate comfort level or baseline comfort level  12/1/2023 1054 by Elaine Srivastava RN  Outcome: Progressing  11/30/2023 2126 by Heike Ziegler RN  Outcome: Progressing     Problem: Skin/Tissue Integrity  Goal: Absence of new skin breakdown  Description: 1. Monitor for areas of redness and/or skin breakdown  2. Assess vascular access sites hourly  3. Every 4-6 hours minimum:  Change oxygen saturation probe site  4. Every 4-6 hours:  If on nasal continuous positive airway pressure, respiratory therapy assess nares and determine need for appliance change or resting period.   12/1/2023 1054 by Elaine Srivastava RN  Outcome: Progressing  11/30/2023 2126 by Heike Ziegler RN  Outcome: Progressing     Problem: Chronic Conditions and Co-morbidities  Goal: Patient's chronic conditions and co-morbidity symptoms are monitored and maintained or improved  12/1/2023 1054 by Elaine Srivastava RN  Outcome: Progressing  11/30/2023 2126 by Heike Ziegler RN  Outcome: Progressing     Problem: ABCDS Injury Assessment  Goal: Absence of physical injury  12/1/2023 1054 by Elaine Srivastava RN  Outcome: Progressing  11/30/2023 2126 by Heike Ziegler RN  Outcome: Progressing

## 2023-12-01 NOTE — PROGRESS NOTES
Was contacted by Gopal Cat from [facility] unfortunately they do not service [address] [place] [address], was given [facility] contact info, [facility]

## 2023-12-02 VITALS
RESPIRATION RATE: 18 BRPM | WEIGHT: 160.94 LBS | OXYGEN SATURATION: 97 % | HEIGHT: 68 IN | DIASTOLIC BLOOD PRESSURE: 84 MMHG | BODY MASS INDEX: 24.39 KG/M2 | HEART RATE: 75 BPM | TEMPERATURE: 97.5 F | SYSTOLIC BLOOD PRESSURE: 132 MMHG

## 2023-12-02 LAB
GLUCOSE BLD-MCNC: 110 MG/DL (ref 74–106)
PERFORMED ON: ABNORMAL

## 2023-12-02 PROCEDURE — 99238 HOSP IP/OBS DSCHRG MGMT 30/<: CPT | Performed by: NURSE PRACTITIONER

## 2023-12-02 PROCEDURE — 6370000000 HC RX 637 (ALT 250 FOR IP): Performed by: PHYSICIAN ASSISTANT

## 2023-12-02 PROCEDURE — 96366 THER/PROPH/DIAG IV INF ADDON: CPT

## 2023-12-02 PROCEDURE — G0378 HOSPITAL OBSERVATION PER HR: HCPCS

## 2023-12-02 RX ORDER — NICOTINE 21 MG/24HR
1 PATCH, TRANSDERMAL 24 HOURS TRANSDERMAL DAILY
Qty: 30 PATCH | Refills: 1 | Status: SHIPPED | OUTPATIENT
Start: 2023-12-03

## 2023-12-02 RX ORDER — FINASTERIDE 5 MG/1
5 TABLET, FILM COATED ORAL DAILY
Qty: 30 TABLET | Refills: 3 | Status: SHIPPED | OUTPATIENT
Start: 2023-12-03

## 2023-12-02 RX ORDER — GUAIFENESIN 600 MG/1
1200 TABLET, EXTENDED RELEASE ORAL 2 TIMES DAILY
Qty: 40 TABLET | Refills: 0 | Status: SHIPPED | OUTPATIENT
Start: 2023-12-02 | End: 2023-12-12

## 2023-12-02 RX ORDER — LORATADINE 10 MG/1
10 TABLET ORAL DAILY
Qty: 90 TABLET | Refills: 0 | Status: SHIPPED | OUTPATIENT
Start: 2023-12-02

## 2023-12-02 RX ORDER — FUROSEMIDE 20 MG/1
TABLET ORAL
Qty: 30 TABLET | Refills: 1 | Status: SHIPPED | OUTPATIENT
Start: 2023-12-02

## 2023-12-02 RX ADMIN — FINASTERIDE 5 MG: 5 TABLET, FILM COATED ORAL at 08:26

## 2023-12-02 RX ADMIN — CILOSTAZOL 100 MG: 100 TABLET ORAL at 08:26

## 2023-12-02 RX ADMIN — ESCITALOPRAM OXALATE 10 MG: 10 TABLET ORAL at 08:27

## 2023-12-02 RX ADMIN — TAMSULOSIN HYDROCHLORIDE 0.4 MG: 0.4 CAPSULE ORAL at 08:26

## 2023-12-02 RX ADMIN — LOSARTAN POTASSIUM 50 MG: 50 TABLET, FILM COATED ORAL at 08:26

## 2023-12-02 RX ADMIN — FLUTICASONE PROPIONATE 1 SPRAY: 50 SPRAY, METERED NASAL at 08:27

## 2023-12-02 RX ADMIN — ASPIRIN 81 MG 81 MG: 81 TABLET ORAL at 08:26

## 2023-12-02 RX ADMIN — MULTIPLE VITAMINS W/ MINERALS TAB 1 TABLET: TAB at 08:26

## 2023-12-02 RX ADMIN — PANTOPRAZOLE SODIUM 40 MG: 40 TABLET, DELAYED RELEASE ORAL at 05:38

## 2023-12-02 RX ADMIN — ALOGLIPTIN 12.5 MG: 12.5 TABLET, FILM COATED ORAL at 08:27

## 2023-12-02 RX ADMIN — METOPROLOL TARTRATE 12.5 MG: 25 TABLET, FILM COATED ORAL at 08:27

## 2023-12-02 NOTE — DISCHARGE INSTRUCTIONS
Activity: activity as tolerated with assistive devices  Diet: cardiac diet diabetic  Follow Up: Primary Care Physician in 1 week for hospital follow up.

## 2023-12-02 NOTE — PLAN OF CARE
Problem: Safety - Adult  Goal: Free from fall injury  12/2/2023 0850 by Luis Guido RN  Outcome: Progressing  12/1/2023 2127 by Susana Laughlin RN  Outcome: Progressing     Problem: Discharge Planning  Goal: Discharge to home or other facility with appropriate resources  12/2/2023 0850 by Luis Guido RN  Outcome: Progressing  12/1/2023 2127 by Susana Laughlin RN  Outcome: Progressing     Problem: Pain  Goal: Verbalizes/displays adequate comfort level or baseline comfort level  12/2/2023 0850 by Luis Guido RN  Outcome: Progressing  12/1/2023 2127 by Susana Laughlin RN  Outcome: Progressing     Problem: Skin/Tissue Integrity  Goal: Absence of new skin breakdown  Description: 1. Monitor for areas of redness and/or skin breakdown  2. Assess vascular access sites hourly  3. Every 4-6 hours minimum:  Change oxygen saturation probe site  4. Every 4-6 hours:  If on nasal continuous positive airway pressure, respiratory therapy assess nares and determine need for appliance change or resting period.   12/2/2023 0850 by Luis Guido RN  Outcome: Progressing  12/1/2023 2127 by Susana Laughlin RN  Outcome: Progressing     Problem: Chronic Conditions and Co-morbidities  Goal: Patient's chronic conditions and co-morbidity symptoms are monitored and maintained or improved  12/1/2023 2127 by Susana Laughlin RN  Outcome: Progressing     Problem: ABCDS Injury Assessment  Goal: Absence of physical injury  12/1/2023 2127 by Susana Laughlin RN  Outcome: Progressing

## 2023-12-02 NOTE — PLAN OF CARE
Problem: Discharge Planning  Goal: Discharge to home or other facility with appropriate resources  12/1/2023 2127 by Sharifa Mckinley RN  Outcome: Progressing  12/1/2023 1054 by Brad Rea RN  Outcome: Progressing  Flowsheets (Taken 12/1/2023 0082)  Discharge to home or other facility with appropriate resources:   Identify barriers to discharge with patient and caregiver   Identify discharge learning needs (meds, wound care, etc)     Problem: Safety - Adult  Goal: Free from fall injury  12/1/2023 2127 by Sharifa Mckinley RN  Outcome: Progressing  12/1/2023 1054 by Brad Rea RN  Outcome: Progressing     Problem: Pain  Goal: Verbalizes/displays adequate comfort level or baseline comfort level  12/1/2023 2127 by Sharifa Mckinley RN  Outcome: Progressing  12/1/2023 1054 by Brad Rea RN  Outcome: Progressing     Problem: Skin/Tissue Integrity  Goal: Absence of new skin breakdown  Description: 1. Monitor for areas of redness and/or skin breakdown  2. Assess vascular access sites hourly  3. Every 4-6 hours minimum:  Change oxygen saturation probe site  4. Every 4-6 hours:  If on nasal continuous positive airway pressure, respiratory therapy assess nares and determine need for appliance change or resting period.   12/1/2023 2127 by Sharifa Mckinley RN  Outcome: Progressing  12/1/2023 1054 by Brad Rea RN  Outcome: Progressing     Problem: Chronic Conditions and Co-morbidities  Goal: Patient's chronic conditions and co-morbidity symptoms are monitored and maintained or improved  12/1/2023 2127 by Sharifa Mckinley RN  Outcome: Progressing  12/1/2023 1054 by Brad Rea RN  Outcome: Progressing     Problem: ABCDS Injury Assessment  Goal: Absence of physical injury  12/1/2023 2127 by Sharifa Mckinley RN  Outcome: Progressing  12/1/2023 1054 by Brad Rea RN  Outcome: Progressing

## 2023-12-02 NOTE — DISCHARGE SUMMARY
Discharge Summary      Patient ID: Jaxson Yu      Patient's PCP: COURT Gibson    Admit Date: 11/29/2023     Discharge Date:   12/2/23    Admitting Provider: Mariah Love MD    Discharging Provider: COURT Villanueva - CNP     Reason for this admission:   Fall, weakness    Discharge Diagnoses: Active Hospital Problems    Diagnosis Date Noted    Dementia with behavioral disturbance [facility]) [Z38.898] 08/01/2022     Priority: Medium    Hypercalcemia [E83.52] 07/29/2022     Priority: Medium    Falls [W19. XXXA] 06/02/2022     Priority: Medium    Carotid stenosis [I65.29] 11/30/2023    Hematuria [R31.9] 11/30/2023    Knee pain [M25.569] 11/30/2023    History of stroke [Z86.73] 04/05/2022    Hypokalemia [E87.6] 04/05/2022    Peripheral vascular disease ([address]) [I73.9] 04/05/2022    Generalized weakness [R53.1] 02/01/2022    Benign essential HTN [I10] 03/28/2011    Diabetes mellitus, type II ([address]) [E11.9] 03/28/2011    Other specified hypothyroidism [E03.8] 03/28/2011       Procedures:  none    Consults:   IP CONSULT TO CASE MANAGEMENT      Briefly:   The patient is a 81 y.o. male with PMH of dementia, PVD, stroke, hypothyroidism, HTN, DM, hypercalcemia, carotid artery disease, and others who presents due to generalized weakness and fall. Hospital Course: Active Hospital Problems    Diagnosis Date Noted    Dementia with behavioral disturbance ([address]) [E49.347]  Continue home Aricept. Continue to manage acute and chronic conditions. 08/01/2022    Hypercalcemia [E83.52]  Patient with history of hypercalcemia. Received gentle IVF. Imaging unremarkable. Last PSA, PTH within normal limits. Follow-up with PCP. 07/29/2022    Falls [H35. XXXA]  Discussed fall prevention and have assistive devices at home. Cooperating with PT OT during hospital.  Manage acute and chronic conditions. 06/02/2022    Carotid stenosis [I65.29]  -Chronic and severe.     -11/30  carotid Left ICA 70-99% stenosis; right ICA

## 2023-12-02 NOTE — PROGRESS NOTES
Pt discharged at this time. Pt IV removed. Pt/daughter educated on follow up appointments and new medications. Pt/daughter verbalized understanding.   Pt left floor via w

## 2023-12-02 NOTE — FLOWSHEET NOTE
12/02/23 0800   Assessment   Charting Type Shift assessment   Psychosocial   Psychosocial (WDL) WDL   Neurological   Neuro (WDL) WDL   Level of Consciousness 0   HEENT (Head, Ears, Eyes, Nose, & Throat)   HEENT (WDL) X   Right Ear Other (comment)  (hard of hearing)   Left Ear Other (comment)  (hard of hearing)   Respiratory   Respiratory (WDL) X   Breath Sounds   Right Upper Lobe Clear;Diminished   Right Middle Lobe Clear;Diminished   Right Lower Lobe Clear;Diminished   Left Upper Lobe Clear;Diminished   Left Lower Lobe Clear;Diminished   Cardiac   Cardiac (WDL) X   Cardiac Regularity Regularly Irregular   Heart Sounds S1, S2   Gastrointestinal   Abdominal (WDL) WDL   RUQ Bowel Sounds Active   LUQ Bowel Sounds Active   RLQ Bowel Sounds Active   LLQ Bowel Sounds Active   Genitourinary   Genitourinary (WDL) X   Urine Assessment   Urine Color Brown   Urine Appearance Cloudy   Urine Odor Malodorous   Bladder Scan Volume (mL) 103 mL   Peripheral Vascular   Peripheral Vascular (WDL) X   Edema None   RLE Neurovascular Assessment   Capillary Refill Less than/Equal to 3 seconds   Color Pale;Pink   Temperature Cool   R Pedal Pulse Doppler   LLE Neurovascular Assessment   Capillary Refill Less than/Equal to 3 seconds   Color Pale   Temperature Cool   L Pedal Pulse +2   Skin Integumentary    Skin Integumentary (WDL) X   Musculoskeletal   Musculoskeletal (WDL) X   RUE Weakness   LUE Weakness   RL Extremity Weakness; Unsteady   LL Extremity Weakness; Unsteady

## 2023-12-02 NOTE — FLOWSHEET NOTE
12/02/23 0800   Whiteboard info   Activity In bed   Patient Goal of the Day(Whiteboard)   Patient Daily Goal reviewed with Patient   Precautions   Precautions Fall risk   Safe Environment   Arm Bands On ID   Overbed Table Within Reach Yes   Fall Risk Interventions   Toilet Every 2 Hours-In Advance of Need Yes   Hourly Visual Checks In bed;Awake   Fall Visual Posted Fall sign posted   Room Door Open Yes   Alarm On Bed   Verbal   Verbal Impairment None   Nutrition   Feeding Able to feed self   Tolerating PO Fluids Yes   Bedside Tablet Status   Bedside Tablet Active Not Available

## 2023-12-02 NOTE — FLOWSHEET NOTE
12/01/23 2120   Assessment   Charting Type Shift assessment   Psychosocial   Psychosocial (WDL) WDL   Neurological   Neuro (WDL) WDL   Level of Consciousness 0   [place] Coma Scale   Eye Opening 4   Best Verbal Response 4   Best Motor Response 6   [place] Coma Scale Score 14   HEENT (Head, Ears, Eyes, Nose, & Throat)   HEENT (WDL) X   Right Ear Other (comment)  (hard of hearing)   Left Ear Other (comment)  (hard of hearing)   Respiratory   Respiratory (WDL) X   Breath Sounds   Right Upper Lobe Clear;Diminished   Right Middle Lobe Clear;Diminished   Right Lower Lobe Clear;Diminished   Left Upper Lobe Clear;Diminished   Left Lower Lobe Clear;Diminished   Cardiac   Cardiac (WDL) X   Cardiac Regularity Regularly Irregular   Heart Sounds S1, S2   Gastrointestinal   Abdominal (WDL) WDL   Last BM (including prior to admit) 12/01/23   RUQ Bowel Sounds Active   LUQ Bowel Sounds Active   RLQ Bowel Sounds Active   LLQ Bowel Sounds Active   Genitourinary   Genitourinary (WDL) X   Urine Assessment   Urine Color Brown   Urine Appearance Cloudy   Urine Odor Malodorous   Peripheral Vascular   Peripheral Vascular (WDL) X   Edema None   RLE Neurovascular Assessment   Capillary Refill Less than/Equal to 3 seconds   Color Pale;Pink   Temperature Cool   R Pedal Pulse Doppler   LLE Neurovascular Assessment   Capillary Refill Less than/Equal to 3 seconds   Color Pale   Temperature Cool   L Pedal Pulse +2   Skin Integumentary    Skin Integumentary (WDL) X   Skin Color Pale   Skin Condition/Temp Warm;Dry   Skin Integrity Ecchymosis   Location scattered   Musculoskeletal   Musculoskeletal (WDL) X   RUE Weakness   LUE Weakness   RL Extremity Weakness; Unsteady   LL Extremity Weakness; Unsteady

## 2023-12-04 NOTE — CARE COORDINATION
I spoke with the patient's wife/caregiver and she reports that he is doing fine,still sleeping this morning. She feels comfortable with his medications and had no  questions or concerns for me at this time. She had his follow up PCP on the calendar.

## 2023-12-05 ENCOUNTER — CARE COORDINATION (OUTPATIENT)
Dept: PRIMARY CARE CLINIC | Age: 83
End: 2023-12-05

## 2023-12-07 NOTE — CARE COORDINATION
Care Transitions Initial Follow Up Call    Call within 2 business days of discharge: Yes    Patient Current Location:  Home: [address] [place] [address]    Care Transition Nurse contacted the spouse/partner by telephone to perform post hospital discharge assessment. Verified name and  with spouse/partner as identifiers. Provided introduction to self, and explanation of the Care Transition Nurse role. Patient: Demetria Gaytan Patient : [id number]   MRN: [id number]  Reason for Admission: DFS  Discharge Date: 23 RARS: No data recorded    Last Discharge Facility       Date Complaint Diagnosis Description Type Department Provider    23 Fall Fall, initial encounter . .. ED to Hosp-Admission (Discharged) (ADMITTED) [facility] Donal Davison MD; Shelby Gonzalez, DO            Was this an external facility discharge? No Discharge Facility: [facility]    Challenges to be reviewed by the provider   Additional needs identified to be addressed with provider: No  none               Method of communication with provider: chart routing. Mrs. Oc Meza tells me that hospice is coming in now and have been there for intake. She tells me that they are getting him a hospital bed and the think they will be happy with this situation. I explained that I wondered why his appointment had been cancelled. I offered that thy were welcome to see his PCP if anything should change. Care Transition Nurse reviewed discharge instructions with spouse/partner who verbalized understanding. The spouse/partner was given an opportunity to ask questions and does not have any further questions or concerns at this time. Were discharge instructions available to patient? Yes. Reviewed appropriate site of care based on symptoms and resources available to patient including: PCP  Home health. The spouse/partner agrees to contact the PCP office for questions related to their healthcare.      Advance Care Planning:   Does patient have an Advance

## 2023-12-09 DIAGNOSIS — I10 BENIGN ESSENTIAL HTN: ICD-10-CM

## 2023-12-11 RX ORDER — LOSARTAN POTASSIUM 50 MG/1
50 TABLET ORAL DAILY
Qty: 30 TABLET | Refills: 3 | Status: SHIPPED | OUTPATIENT
Start: 2023-12-11